# Patient Record
Sex: FEMALE | Race: WHITE | Employment: OTHER | URBAN - METROPOLITAN AREA
[De-identification: names, ages, dates, MRNs, and addresses within clinical notes are randomized per-mention and may not be internally consistent; named-entity substitution may affect disease eponyms.]

---

## 2023-09-07 PROBLEM — H40.1190 PRIMARY OPEN ANGLE GLAUCOMA: Status: ACTIVE | Noted: 2023-09-07

## 2023-09-07 PROBLEM — R73.09 BLOOD GLUCOSE ABNORMAL: Status: ACTIVE | Noted: 2023-09-07

## 2023-09-07 PROBLEM — H25.13 AGE-RELATED NUCLEAR CATARACT, BILATERAL: Status: ACTIVE | Noted: 2023-09-07

## 2023-09-07 PROBLEM — Z91.89 AT RISK FOR BLEEDING: Status: ACTIVE | Noted: 2023-09-07

## 2023-09-07 PROBLEM — E11.9 CONTROLLED DIABETES MELLITUS TYPE II WITHOUT COMPLICATION (MULTI): Status: ACTIVE | Noted: 2023-09-07

## 2023-09-07 PROBLEM — E11.319 DIABETIC RETINOPATHY (MULTI): Status: ACTIVE | Noted: 2023-09-07

## 2023-09-07 PROBLEM — I25.10 ATHEROSCLEROTIC HEART DISEASE OF NATIVE CORONARY ARTERY WITHOUT ANGINA PECTORIS: Status: ACTIVE | Noted: 2023-09-07

## 2023-09-07 PROBLEM — E11.65 HYPERGLYCEMIA DUE TO TYPE 2 DIABETES MELLITUS (MULTI): Status: ACTIVE | Noted: 2023-09-07

## 2023-09-07 PROBLEM — H43.813 POSTERIOR VITREOUS DETACHMENT OF BOTH EYES: Status: ACTIVE | Noted: 2023-09-07

## 2023-09-07 PROBLEM — E66.01 MORBID OBESITY (MULTI): Status: ACTIVE | Noted: 2023-09-07

## 2023-09-07 PROBLEM — D64.9 ANEMIA: Status: ACTIVE | Noted: 2023-09-07

## 2023-09-07 PROBLEM — E27.8 ADRENAL MASS (MULTI): Status: ACTIVE | Noted: 2023-09-07

## 2023-09-07 PROBLEM — I50.32 CHRONIC HEART FAILURE WITH PRESERVED EJECTION FRACTION (HFPEF) (MULTI): Status: ACTIVE | Noted: 2023-09-07

## 2023-09-07 PROBLEM — I10 ESSENTIAL HYPERTENSION: Status: ACTIVE | Noted: 2023-09-07

## 2023-09-07 PROBLEM — M81.0 AGE RELATED OSTEOPOROSIS: Status: ACTIVE | Noted: 2023-09-07

## 2023-09-07 PROBLEM — E11.3219: Status: ACTIVE | Noted: 2023-09-07

## 2023-09-07 PROBLEM — R60.9 EDEMA: Status: ACTIVE | Noted: 2023-09-07

## 2023-09-07 PROBLEM — N18.9 CHRONIC RENAL FAILURE SYNDROME: Status: ACTIVE | Noted: 2023-09-07

## 2023-09-07 PROBLEM — K21.9 GASTROESOPHAGEAL REFLUX DISEASE WITHOUT ESOPHAGITIS: Status: ACTIVE | Noted: 2023-09-07

## 2023-09-07 PROBLEM — I48.20 CHRONIC ATRIAL FIBRILLATION (MULTI): Status: ACTIVE | Noted: 2023-09-07

## 2023-09-07 PROBLEM — E78.2 MIXED HYPERLIPIDEMIA: Status: ACTIVE | Noted: 2023-09-07

## 2023-09-07 PROBLEM — H26.9 CATARACTS, BILATERAL: Status: ACTIVE | Noted: 2023-09-07

## 2023-09-07 PROBLEM — E55.9 VITAMIN D DEFICIENCY: Status: ACTIVE | Noted: 2023-09-07

## 2023-09-07 PROBLEM — I67.9 CEREBROVASCULAR DISEASE: Status: ACTIVE | Noted: 2023-09-07

## 2023-09-07 PROBLEM — E03.8 SUBCLINICAL HYPOTHYROIDISM: Status: ACTIVE | Noted: 2023-09-07

## 2023-09-07 PROBLEM — Z86.73 HISTORY OF ISCHEMIC STROKE: Status: ACTIVE | Noted: 2023-09-07

## 2023-09-07 PROBLEM — R55 SYNCOPE AND COLLAPSE: Status: ACTIVE | Noted: 2023-09-07

## 2023-09-07 PROBLEM — Z97.3 WEARS GLASSES: Status: ACTIVE | Noted: 2023-09-07

## 2023-09-07 PROBLEM — H40.9 GLAUCOMA: Status: ACTIVE | Noted: 2023-09-07

## 2023-09-07 RX ORDER — WARFARIN 6 MG/1
6 TABLET ORAL
COMMUNITY

## 2023-09-07 RX ORDER — ATORVASTATIN CALCIUM 20 MG/1
20 TABLET, FILM COATED ORAL DAILY
COMMUNITY
End: 2023-11-27

## 2023-09-07 RX ORDER — DULAGLUTIDE 1.5 MG/.5ML
INJECTION, SOLUTION SUBCUTANEOUS
COMMUNITY
End: 2023-11-21 | Stop reason: SDUPTHER

## 2023-09-07 RX ORDER — METFORMIN HYDROCHLORIDE 1000 MG/1
1000 TABLET ORAL
COMMUNITY
End: 2024-02-28 | Stop reason: WASHOUT

## 2023-09-07 RX ORDER — TORSEMIDE 100 MG/1
0.5 TABLET ORAL 2 TIMES DAILY
COMMUNITY
End: 2023-12-10 | Stop reason: ALTCHOICE

## 2023-09-07 RX ORDER — OMEPRAZOLE 20 MG/1
20 CAPSULE, DELAYED RELEASE ORAL
COMMUNITY

## 2023-09-07 RX ORDER — ACETAMINOPHEN 325 MG/1
325 TABLET ORAL
COMMUNITY

## 2023-09-07 RX ORDER — INSULIN LISPRO 100 [IU]/ML
INJECTION, SOLUTION INTRAVENOUS; SUBCUTANEOUS
COMMUNITY
Start: 2023-02-07 | End: 2024-01-04 | Stop reason: SDUPTHER

## 2023-09-07 RX ORDER — BLOOD SUGAR DIAGNOSTIC
STRIP MISCELLANEOUS 3 TIMES DAILY
COMMUNITY
End: 2023-10-11 | Stop reason: SDUPTHER

## 2023-09-07 RX ORDER — INSULIN GLARGINE 100 [IU]/ML
INJECTION, SOLUTION SUBCUTANEOUS
COMMUNITY
End: 2023-11-28 | Stop reason: SDUPTHER

## 2023-09-07 RX ORDER — POTASSIUM CHLORIDE 750 MG/1
10 CAPSULE, EXTENDED RELEASE ORAL
COMMUNITY
End: 2023-12-10 | Stop reason: ALTCHOICE

## 2023-09-07 RX ORDER — TORSEMIDE 100 MG/1
100 TABLET ORAL EVERY MORNING
COMMUNITY
End: 2023-12-26

## 2023-09-07 RX ORDER — GLIPIZIDE 5 MG/1
10 TABLET ORAL DAILY
COMMUNITY
End: 2024-02-28 | Stop reason: WASHOUT

## 2023-09-07 RX ORDER — CARVEDILOL 6.25 MG/1
6.25 TABLET ORAL
COMMUNITY
End: 2023-12-11 | Stop reason: SDUPTHER

## 2023-09-07 RX ORDER — CARVEDILOL 3.12 MG/1
3.12 TABLET ORAL 2 TIMES DAILY
COMMUNITY
End: 2024-02-19 | Stop reason: SDUPTHER

## 2023-09-07 RX ORDER — ISOSORBIDE MONONITRATE 60 MG/1
60 TABLET, EXTENDED RELEASE ORAL DAILY
COMMUNITY

## 2023-09-07 RX ORDER — LATANOPROST 50 UG/ML
1 SOLUTION/ DROPS OPHTHALMIC NIGHTLY
COMMUNITY
Start: 2013-07-31

## 2023-09-07 RX ORDER — HYDRALAZINE HYDROCHLORIDE 50 MG/1
50 TABLET, FILM COATED ORAL 2 TIMES DAILY
COMMUNITY
End: 2024-03-07 | Stop reason: SDUPTHER

## 2023-09-07 RX ORDER — TIMOLOL MALEATE 5 MG/ML
1 SOLUTION/ DROPS OPHTHALMIC EVERY MORNING
COMMUNITY
Start: 2016-11-04

## 2023-09-07 RX ORDER — FERROUS SULFATE, DRIED 160(50) MG
TABLET, EXTENDED RELEASE ORAL
COMMUNITY

## 2023-09-26 ENCOUNTER — HOSPITAL ENCOUNTER (OUTPATIENT)
Dept: DATA CONVERSION | Facility: HOSPITAL | Age: 88
Discharge: HOME | End: 2023-09-26
Payer: MEDICARE

## 2023-09-26 DIAGNOSIS — N18.4 CHRONIC KIDNEY DISEASE, STAGE 4 (SEVERE) (MULTI): ICD-10-CM

## 2023-09-26 DIAGNOSIS — N25.81 SECONDARY HYPERPARATHYROIDISM OF RENAL ORIGIN (MULTI): ICD-10-CM

## 2023-09-26 LAB
ALBUMIN SERPL-MCNC: 3.2 GM/DL (ref 3.5–5)
ANION GAP SERPL CALCULATED.3IONS-SCNC: 10 MMOL/L (ref 0–19)
BUN SERPL-MCNC: 24 MG/DL (ref 8–25)
BUN/CREAT SERPL: 14.1 RATIO (ref 8–21)
CALCIUM SERPL-MCNC: 9.4 MG/DL (ref 8.5–10.4)
CHLORIDE SERPL-SCNC: 104 MMOL/L (ref 97–107)
CO2 SERPL-SCNC: 29 MMOL/L (ref 24–31)
CREAT SERPL-MCNC: 1.7 MG/DL (ref 0.4–1.6)
DEPRECATED RDW RBC AUTO: 50.8 FL (ref 37–54)
ERYTHROCYTE [DISTWIDTH] IN BLOOD BY AUTOMATED COUNT: 14 % (ref 11.7–15)
GFR SERPL CREATININE-BSD FRML MDRD: 29 ML/MIN/1.73 M2
GLUCOSE SERPL-MCNC: 83 MG/DL (ref 65–99)
HCT VFR BLD AUTO: 37.3 % (ref 36–44)
HGB BLD-MCNC: 12 GM/DL (ref 12–15)
MCH RBC QN AUTO: 32.2 PG (ref 26–34)
MCHC RBC AUTO-ENTMCNC: 32.2 % (ref 31–37)
MCV RBC AUTO: 100 FL (ref 80–100)
NRBC BLD-RTO: 0 /100 WBC
PHOSPHATE SERPL-MCNC: 2.6 MG/DL (ref 2.5–4.5)
PLATELET # BLD AUTO: 196 K/UL (ref 150–450)
PMV BLD AUTO: 11.6 CU (ref 7–12.6)
POTASSIUM SERPL-SCNC: 3.5 MMOL/L (ref 3.4–5.1)
RBC # BLD AUTO: 3.73 M/UL (ref 4–4.9)
SODIUM SERPL-SCNC: 143 MMOL/L (ref 133–145)
WBC # BLD AUTO: 7 K/UL (ref 4.5–11)

## 2023-09-29 DIAGNOSIS — I48.91 ATRIAL FIBRILLATION, UNSPECIFIED TYPE (MULTI): Primary | ICD-10-CM

## 2023-09-29 LAB
INR IN PPP BY COAGULATION ASSAY EXTERNAL: 3.9
PROTHROMBIN TIME (PT) IN PPP BY COAGULATION ASSAY EXTERNAL: NORMAL SECONDS

## 2023-10-02 PROBLEM — I48.91 ATRIAL FIBRILLATION (MULTI): Status: ACTIVE | Noted: 2023-10-02

## 2023-10-02 LAB
CALCIUM SERPL-MCNC: 9.4 MG/DL (ref 8.5–10.4)
PHOSPHATE SERPL-MCNC: 2.6 MG/DL (ref 2.5–4.5)
PTH-INTACT SERPL-MCNC: NORMAL PG/ML (ref 15–65)

## 2023-10-06 ENCOUNTER — ANTICOAGULATION - WARFARIN VISIT (OUTPATIENT)
Dept: CARDIOLOGY | Facility: CLINIC | Age: 88
End: 2023-10-06
Payer: MEDICARE

## 2023-10-06 ENCOUNTER — APPOINTMENT (OUTPATIENT)
Dept: CARDIOLOGY | Facility: CLINIC | Age: 88
End: 2023-10-06
Payer: MEDICARE

## 2023-10-06 DIAGNOSIS — I48.91 ATRIAL FIBRILLATION, UNSPECIFIED TYPE (MULTI): ICD-10-CM

## 2023-10-06 LAB
POC INR: 2
POC PROTHROMBIN TIME: NORMAL

## 2023-10-06 PROCEDURE — 85610 PROTHROMBIN TIME: CPT

## 2023-10-06 PROCEDURE — 99211 OFF/OP EST MAY X REQ PHY/QHP: CPT | Mod: 27 | Performed by: INTERNAL MEDICINE

## 2023-10-06 NOTE — PROGRESS NOTES
Patient identification verified with 2 identifiers.    Location: [facility] ([address]) - [address] [address] [place] [address] [phone]     Referring Physician: Dr Potts  Enrollment/ Re-enrollment date: 24   INR Goal: 2.0-3.0  INR monitoring is per [facility] protocol.  Anticoagulation Medication: warfarin  Indication: atrial fibrillation    Subjective   Bleeding signs/symptoms: No    Bruising: No   Major bleeding event: No  Thrombosis signs/symptoms: No  Thromboembolic event: No  Missed doses: No  Extra doses: No  Medication changes: No  Dietary changes: No  Change in health: No  Change in activity: No  Alcohol: No  Other concerns: No    Upcoming Surgeries:  Does the Patient Have any upcoming surgeries that require interruption in anticoagulation therapy? no  Does the patient require bridging? no      Anticoagulation Summary  As of 10/6/2023      INR goal:  2.0-3.0   TTR:  --   INR used for dosin.00 (10/6/2023)   Weekly warfarin total:  45 mg               Assessment/Plan   Therapeutic     1. New dose: no change    2. Next INR: 1 week      Education provided to patient during the visit:  Patient instructed to call in interim with questions, concerns and changes.

## 2023-10-11 DIAGNOSIS — Z79.4 TYPE 2 DIABETES MELLITUS WITH HYPERGLYCEMIA, WITH LONG-TERM CURRENT USE OF INSULIN (MULTI): Primary | ICD-10-CM

## 2023-10-11 DIAGNOSIS — E11.65 TYPE 2 DIABETES MELLITUS WITH HYPERGLYCEMIA, WITH LONG-TERM CURRENT USE OF INSULIN (MULTI): Primary | ICD-10-CM

## 2023-10-11 RX ORDER — BLOOD SUGAR DIAGNOSTIC
1 STRIP MISCELLANEOUS 3 TIMES DAILY
Qty: 300 STRIP | Refills: 1 | Status: SHIPPED | OUTPATIENT
Start: 2023-10-11 | End: 2023-11-28

## 2023-10-20 ENCOUNTER — ANTICOAGULATION - WARFARIN VISIT (OUTPATIENT)
Dept: CARDIOLOGY | Facility: CLINIC | Age: 88
End: 2023-10-20
Payer: MEDICARE

## 2023-10-20 DIAGNOSIS — I48.91 ATRIAL FIBRILLATION, UNSPECIFIED TYPE (MULTI): ICD-10-CM

## 2023-10-20 LAB
POC INR: 2.7
POC PROTHROMBIN TIME: NORMAL

## 2023-10-20 PROCEDURE — 85610 PROTHROMBIN TIME: CPT | Mod: QW

## 2023-10-20 PROCEDURE — 99211 OFF/OP EST MAY X REQ PHY/QHP: CPT | Mod: PO | Performed by: INTERNAL MEDICINE

## 2023-10-20 NOTE — PROGRESS NOTES
Patient identification verified with 2 identifiers.    Location: [facility] - [address] [place] [address] [phone]     Referring Physician:  DR TAPIA  Enrollment/ Re-enrollment date: 24   INR Goal: 2.0-3.0  [facility].  Anticoagulation Medication: warfarin  Indication: atrial fibrillation    Subjective   Bleeding signs/symptoms: No    Bruising: No   Major bleeding event: No  Thrombosis signs/symptoms: No  Thromboembolic event: No  Missed doses: No  Extra doses: No  Medication changes: No  Dietary changes: No  Change in health: No  Change in activity: No  Alcohol: No  Other concerns: No    Upcoming Surgeries:  Does the Patient Have any upcoming surgeries that require interruption in anticoagulation therapy? no  Does the patient require bridging? no      Anticoagulation Summary  As of 10/20/2023      INR goal:  2.0-3.0   TTR:  100.0 % (1.6 wk)   INR used for dosin.70 (10/20/2023)   Weekly warfarin total:  45 mg               Assessment/Plan   Therapeutic     1. New dose: no change    2. Next INR: 1 month      Education provided to patient during the visit:  Patient instructed to call in interim with questions, concerns and changes.

## 2023-11-03 NOTE — PROGRESS NOTES
Patient ID: Shoshana Thomas is a 88 y.o. female.    Subjective    HPI  REASON FOR VISIT: Macrocytic anemia with B12 deficiency.  HISTORY OF PRESENT ILLNESS: This is an 88-year-old woman with a history of stage 4 chronic kidneydisease, diabetes, hypothyroidism, atrial fibrillation, hypertension and progressive macrocytic anemia in 2018diagnosed with B12 deficiency. Our evaluation  revealed macrocytic anemia, but white blood cell, subsets andplatelets were within normal limits. Initial evaluation noted modest iron deficiency as well as a B12 deficiency inlight of an elevated methylmalonic acid. She has been on B12 since February 2019, with monthly intramuscular  injections and has not had a trial of oral B12. She was also noted to have an EPO level between 40-50. She receivedIV Feraheme in September 2018. She has never required Procrit as her hemoglobin has remained over 10 and in fact,  more recently normal. Review of her ferritin values reveal a ferritin of above 130 on the September 30, 2019 with agradual decline to less than 80 in March 2021. She denies diarrhea or constipation. History of colonoscopy remotelyin the past. She does  admit to pagophagia and sometimes has chills, but denies coilonychia or tongue soreness. Shehas reflux symptoms, but no heartburn while taking omeprazole. She denies abdominal pain. There is no familyhistory of GI malignancy. There is a family history  of breast cancer, however, she denies breast lumps.     She has been taking oral B12 but not sublingually.  She had a follow-up with her nephrologist who estimates that in 2 years time she will be needing dialysis.  She has a chief complaint of arthritis particularly in her shoulders.  She has limited range  of motion.    No fevers, no night sweats.  No falls.  She occasionally has posterior nocturnal dyspnea.    Objective    BSA: There is no height or weight on file to calculate BSA.  There were no vitals taken for this visit.    Review of Systems   Constitutional:  Negative for fatigue.   HENT:  Negative for trouble swallowing.    Eyes:  Negative for visual disturbance.   Respiratory:  Negative for shortness of breath (postural nocturnal dyspnea).    Cardiovascular:  Negative for leg swelling.   Gastrointestinal:  Negative for blood in stool and constipation.   Endocrine: Negative for cold intolerance.   Genitourinary:  Negative for difficulty urinating.   Musculoskeletal:  Positive for back pain (lower back).   Skin:  Negative for rash and wound.   Allergic/Immunologic: Negative for environmental allergies.   Neurological:  Positive for light-headedness.   Hematological:  Does not bruise/bleed easily.   Psychiatric/Behavioral:  Negative for sleep disturbance.        Physical Exam  Vitals reviewed: in wheelchair.   Constitutional:       Appearance: Normal appearance.   HENT:      Head: Normocephalic and atraumatic.      Right Ear: External ear normal.      Left Ear: External ear normal.      Nose: Nose normal.      Mouth/Throat:      Mouth: Mucous membranes are moist.      Pharynx: Oropharynx is clear.   Eyes:      Extraocular Movements: Extraocular movements intact.      Pupils: Pupils are equal, round, and reactive to light.   Cardiovascular:      Rate and Rhythm: Normal rate and regular rhythm.   Pulmonary:      Effort: Pulmonary effort is normal. No respiratory distress.   Abdominal:      General: Abdomen is flat. Bowel sounds are normal.      Palpations: Abdomen is soft.   Musculoskeletal:         General: No swelling. Normal range of motion.      Cervical back: Normal range of motion and neck supple.   Skin:     General: Skin is warm.   Neurological:      General: No focal deficit present.      Mental Status: She is alert and oriented to person, place, and time.   Psychiatric:         Mood and Affect: Mood normal.         Behavior: Behavior normal.       Performance Status:        Assessment/Plan   Data: 3/15/2021, kappa 125, lambda  82, ratio normal   Ferritin 77  , 28% iron saturation  B12 normal   RBC 3.86, hemoglobin 12.3, white blood count 7.7, platelet count 239     SUMMARY: An 88-year-old woman with a history of stage 4 chronic kidney disease, diabetes, atrial fibrillation,  macrocytic anemia, diagnosed with B12 deficiency on the evidence of an elevated methylmalonic acid and managed with parenteral B12 therapy since February 2019, then transitioned to oral B12 in 2021. She has had IV Feraheme in the past. Her labs have previously  featured a gradual decline in her ferritin.     ASSESSMENT & PLAN:  1. B12 deficiency on the basis of an elevated methylmalonic acid.     begun on parenteral B12 therapy in February 2019.     begun on oral B12 in 2021, which she has not been taking sublingually.     methylmalonic acid 0.35 (4-24-23).    We do not have the benefit of a recent EGD evaluation. She is not complaining of UGI symptoms.    PLAN:   B12 level and methylmalonic acid  continue oral B12.  follow B12 and methylmalonic acid  Return to clinic in 6 months     2. Chronic kidney disease, not on dialysis.   Creatinin 1.60, eGFR 31 (3-20-22)   Creatinine 1.69, egFR 29 (4-24-23)   PLAN: Follow up with Dr. Portillo.    She has not been a candidate for Procrit, because her hemoglobin has been in the normal range.      3. Iron deficiency.    Ferritin 107 (3-23-23), 21% saturation TIBC 271   Patient denies GI bleeding and denies diarrhea. She actually has constipation.    She has a history of a colonoscopy in the remote past. She has been inclined to defer referral to Gastroenterology for colonoscopy.   She has been on ferrous sulfate every other day, which she tolerates well.  She has mild iron deficiency symptoms of chills and ice craving.   PLAN:  Ferritin and iron panel  RTC 6 months, MD visit and repeat assessment ferritin, iron profile, methylmalonic acid and CBC     Otherwise, return to clinic in six months    Cancer Staging   No matching  staging information was found for the patient.    Oncology History    No history exists.                 Alber Clayton MD

## 2023-11-06 ENCOUNTER — OFFICE VISIT (OUTPATIENT)
Dept: HEMATOLOGY/ONCOLOGY | Facility: CLINIC | Age: 88
End: 2023-11-06
Payer: MEDICARE

## 2023-11-06 VITALS
SYSTOLIC BLOOD PRESSURE: 167 MMHG | OXYGEN SATURATION: 98 % | DIASTOLIC BLOOD PRESSURE: 85 MMHG | HEART RATE: 65 BPM | WEIGHT: 218.26 LBS | TEMPERATURE: 96.4 F | BODY MASS INDEX: 37.17 KG/M2

## 2023-11-06 DIAGNOSIS — D53.9 MACROCYTIC ANEMIA: Primary | ICD-10-CM

## 2023-11-06 LAB
ALBUMIN SERPL BCP-MCNC: 3.5 G/DL (ref 3.4–5)
ALP SERPL-CCNC: 61 U/L (ref 33–136)
ALT SERPL W P-5'-P-CCNC: 17 U/L (ref 7–45)
ANION GAP SERPL CALC-SCNC: 13 MMOL/L (ref 10–20)
AST SERPL W P-5'-P-CCNC: 15 U/L (ref 9–39)
BASOPHILS # BLD AUTO: 0.03 X10*3/UL (ref 0–0.1)
BASOPHILS NFR BLD AUTO: 0.4 %
BILIRUB SERPL-MCNC: 0.5 MG/DL (ref 0–1.2)
BUN SERPL-MCNC: 28 MG/DL (ref 6–23)
CALCIUM SERPL-MCNC: 9.6 MG/DL (ref 8.6–10.3)
CHLORIDE SERPL-SCNC: 101 MMOL/L (ref 98–107)
CO2 SERPL-SCNC: 34 MMOL/L (ref 21–32)
CREAT SERPL-MCNC: 1.72 MG/DL (ref 0.5–1.05)
EOSINOPHIL # BLD AUTO: 0.18 X10*3/UL (ref 0–0.4)
EOSINOPHIL NFR BLD AUTO: 2.6 %
ERYTHROCYTE [DISTWIDTH] IN BLOOD BY AUTOMATED COUNT: 13.3 % (ref 11.5–14.5)
FERRITIN SERPL-MCNC: 142 NG/ML (ref 8–150)
GFR SERPL CREATININE-BSD FRML MDRD: 28 ML/MIN/1.73M*2
GLUCOSE SERPL-MCNC: 171 MG/DL (ref 74–99)
HCT VFR BLD AUTO: 36.8 % (ref 36–46)
HGB BLD-MCNC: 12.1 G/DL (ref 12–16)
IMM GRANULOCYTES # BLD AUTO: 0.02 X10*3/UL (ref 0–0.5)
IMM GRANULOCYTES NFR BLD AUTO: 0.3 % (ref 0–0.9)
IRON SATN MFR SERPL: 24 % (ref 25–45)
IRON SERPL-MCNC: 66 UG/DL (ref 35–150)
LYMPHOCYTES # BLD AUTO: 2.48 X10*3/UL (ref 0.8–3)
LYMPHOCYTES NFR BLD AUTO: 36.2 %
MCH RBC QN AUTO: 32.7 PG (ref 26–34)
MCHC RBC AUTO-ENTMCNC: 32.9 G/DL (ref 32–36)
MCV RBC AUTO: 100 FL (ref 80–100)
MONOCYTES # BLD AUTO: 0.52 X10*3/UL (ref 0.05–0.8)
MONOCYTES NFR BLD AUTO: 7.6 %
NEUTROPHILS # BLD AUTO: 3.63 X10*3/UL (ref 1.6–5.5)
NEUTROPHILS NFR BLD AUTO: 52.9 %
NRBC BLD-RTO: 0 /100 WBCS (ref 0–0)
PLATELET # BLD AUTO: 197 X10*3/UL (ref 150–450)
POTASSIUM SERPL-SCNC: 3.2 MMOL/L (ref 3.5–5.3)
PROT SERPL-MCNC: 7.5 G/DL (ref 6.4–8.2)
RBC # BLD AUTO: 3.7 X10*6/UL (ref 4–5.2)
SODIUM SERPL-SCNC: 145 MMOL/L (ref 136–145)
TIBC SERPL-MCNC: 274 UG/DL (ref 240–445)
UIBC SERPL-MCNC: 208 UG/DL (ref 110–370)
VIT B12 SERPL-MCNC: 575 PG/ML (ref 211–911)
WBC # BLD AUTO: 6.9 X10*3/UL (ref 4.4–11.3)

## 2023-11-06 PROCEDURE — 99214 OFFICE O/P EST MOD 30 MIN: CPT | Performed by: INTERNAL MEDICINE

## 2023-11-06 PROCEDURE — 3079F DIAST BP 80-89 MM HG: CPT | Performed by: INTERNAL MEDICINE

## 2023-11-06 PROCEDURE — 36415 COLL VENOUS BLD VENIPUNCTURE: CPT | Performed by: INTERNAL MEDICINE

## 2023-11-06 PROCEDURE — 1036F TOBACCO NON-USER: CPT | Performed by: INTERNAL MEDICINE

## 2023-11-06 PROCEDURE — 3077F SYST BP >= 140 MM HG: CPT | Performed by: INTERNAL MEDICINE

## 2023-11-06 PROCEDURE — 82607 VITAMIN B-12: CPT | Performed by: INTERNAL MEDICINE

## 2023-11-06 PROCEDURE — 83550 IRON BINDING TEST: CPT | Performed by: INTERNAL MEDICINE

## 2023-11-06 PROCEDURE — 82728 ASSAY OF FERRITIN: CPT | Performed by: INTERNAL MEDICINE

## 2023-11-06 PROCEDURE — 1125F AMNT PAIN NOTED PAIN PRSNT: CPT | Performed by: INTERNAL MEDICINE

## 2023-11-06 PROCEDURE — 1159F MED LIST DOCD IN RCRD: CPT | Performed by: INTERNAL MEDICINE

## 2023-11-06 ASSESSMENT — ENCOUNTER SYMPTOMS
OCCASIONAL FEELINGS OF UNSTEADINESS: 1
LIGHT-HEADEDNESS: 1
WOUND: 0
LOSS OF SENSATION IN FEET: 1
BLOOD IN STOOL: 0
CONSTIPATION: 0
SHORTNESS OF BREATH: 0
TROUBLE SWALLOWING: 0
FATIGUE: 0
DEPRESSION: 0
DIFFICULTY URINATING: 0
SLEEP DISTURBANCE: 0
BACK PAIN: 1
BRUISES/BLEEDS EASILY: 0

## 2023-11-06 ASSESSMENT — COLUMBIA-SUICIDE SEVERITY RATING SCALE - C-SSRS
6. HAVE YOU EVER DONE ANYTHING, STARTED TO DO ANYTHING, OR PREPARED TO DO ANYTHING TO END YOUR LIFE?: NO
2. HAVE YOU ACTUALLY HAD ANY THOUGHTS OF KILLING YOURSELF?: NO
1. IN THE PAST MONTH, HAVE YOU WISHED YOU WERE DEAD OR WISHED YOU COULD GO TO SLEEP AND NOT WAKE UP?: NO

## 2023-11-06 ASSESSMENT — PATIENT HEALTH QUESTIONNAIRE - PHQ9
SUM OF ALL RESPONSES TO PHQ9 QUESTIONS 1 AND 2: 0
1. LITTLE INTEREST OR PLEASURE IN DOING THINGS: NOT AT ALL
2. FEELING DOWN, DEPRESSED OR HOPELESS: NOT AT ALL

## 2023-11-06 ASSESSMENT — PAIN SCALES - GENERAL: PAINLEVEL: 9

## 2023-11-06 NOTE — PROGRESS NOTES
Patient here for follow up with Dr. Clayton.     Medications and allergies reviewed.     Labs to be drawn today.     Patient to follow up in 6 months.

## 2023-11-09 LAB — METHYLMALONATE SERPL-SCNC: 0.35 UMOL/L (ref 0–0.4)

## 2023-11-15 DIAGNOSIS — E10.319: Primary | ICD-10-CM

## 2023-11-15 RX ORDER — PEN NEEDLE, DIABETIC 30 GX3/16"
400 NEEDLE, DISPOSABLE MISCELLANEOUS 4 TIMES DAILY
COMMUNITY
End: 2023-11-15 | Stop reason: SDUPTHER

## 2023-11-15 RX ORDER — PEN NEEDLE, DIABETIC 30 GX3/16"
1 NEEDLE, DISPOSABLE MISCELLANEOUS 4 TIMES DAILY
Qty: 400 EACH | Refills: 1 | Status: SHIPPED | OUTPATIENT
Start: 2023-11-15

## 2023-11-17 ENCOUNTER — ANTICOAGULATION - WARFARIN VISIT (OUTPATIENT)
Dept: CARDIOLOGY | Facility: CLINIC | Age: 88
End: 2023-11-17
Payer: MEDICARE

## 2023-11-17 DIAGNOSIS — I48.91 ATRIAL FIBRILLATION, UNSPECIFIED TYPE (MULTI): ICD-10-CM

## 2023-11-17 LAB
POC INR: 2.5
POC PROTHROMBIN TIME: NORMAL

## 2023-11-17 PROCEDURE — 99211 OFF/OP EST MAY X REQ PHY/QHP: CPT | Performed by: INTERNAL MEDICINE

## 2023-11-17 PROCEDURE — 85610 PROTHROMBIN TIME: CPT | Mod: QW

## 2023-11-17 NOTE — PROGRESS NOTES
Patient identification verified with 2 identifiers.    Location: [facility] - [address] [place] [address] [phone]     Referring Physician:  DR TAPIA  Enrollment/ Re-enrollment date: 24   INR Goal: 2.0-3.0  INR monitoring is per [facility] protocol.  Anticoagulation Medication: warfarin  Indication: atrial fibrillation    Subjective   Bleeding signs/symptoms: No    Bruising: No   Major bleeding event: No  Thrombosis signs/symptoms: No  Thromboembolic event: No  Missed doses: No  Extra doses: No  Medication changes: No  Dietary changes: No  Change in health: No  Change in activity: No  Alcohol: No  Other concerns: No    Upcoming Surgeries:  Does the Patient Have any upcoming surgeries that require interruption in anticoagulation therapy? no  Does the patient require bridging? no      Anticoagulation Summary  As of 2023      INR goal:  2.0-3.0   TTR:  100.0 % (1.3 mo)   INR used for dosin.50 (2023)   Weekly warfarin total:  45 mg               Assessment/Plan   Therapeutic     1. New dose: no change    2. Next INR: 1 month      Education provided to patient during the visit:  Patient instructed to call in interim with questions, concerns and changes.

## 2023-11-21 DIAGNOSIS — E11.65 TYPE 2 DIABETES MELLITUS WITH HYPERGLYCEMIA, WITH LONG-TERM CURRENT USE OF INSULIN (MULTI): Primary | ICD-10-CM

## 2023-11-21 DIAGNOSIS — Z79.4 TYPE 2 DIABETES MELLITUS WITH HYPERGLYCEMIA, WITH LONG-TERM CURRENT USE OF INSULIN (MULTI): Primary | ICD-10-CM

## 2023-11-21 RX ORDER — DULAGLUTIDE 1.5 MG/.5ML
1.5 INJECTION, SOLUTION SUBCUTANEOUS
Qty: 2 ML | Refills: 0 | Status: SHIPPED | OUTPATIENT
Start: 2023-11-21 | End: 2023-11-28 | Stop reason: SDUPTHER

## 2023-11-24 DIAGNOSIS — E78.2 MIXED HYPERLIPIDEMIA: ICD-10-CM

## 2023-11-27 ENCOUNTER — HOSPITAL ENCOUNTER (EMERGENCY)
Facility: HOSPITAL | Age: 88
Discharge: HOME | End: 2023-11-27
Attending: STUDENT IN AN ORGANIZED HEALTH CARE EDUCATION/TRAINING PROGRAM
Payer: MEDICARE

## 2023-11-27 ENCOUNTER — TELEPHONE (OUTPATIENT)
Dept: HEMATOLOGY/ONCOLOGY | Facility: CLINIC | Age: 88
End: 2023-11-27
Payer: MEDICARE

## 2023-11-27 VITALS
SYSTOLIC BLOOD PRESSURE: 125 MMHG | OXYGEN SATURATION: 98 % | BODY MASS INDEX: 36.7 KG/M2 | HEART RATE: 60 BPM | DIASTOLIC BLOOD PRESSURE: 69 MMHG | TEMPERATURE: 98.1 F | WEIGHT: 215 LBS | HEIGHT: 64 IN | RESPIRATION RATE: 18 BRPM

## 2023-11-27 DIAGNOSIS — T38.3X1A INSULIN OVERDOSE, ACCIDENTAL OR UNINTENTIONAL, INITIAL ENCOUNTER: Primary | ICD-10-CM

## 2023-11-27 DIAGNOSIS — D50.9 IRON DEFICIENCY ANEMIA, UNSPECIFIED IRON DEFICIENCY ANEMIA TYPE: Primary | ICD-10-CM

## 2023-11-27 LAB
ALBUMIN SERPL-MCNC: 3.7 G/DL (ref 3.5–5)
ALP BLD-CCNC: 89 U/L (ref 35–125)
ALT SERPL-CCNC: 19 U/L (ref 5–40)
ANION GAP SERPL CALC-SCNC: 11 MMOL/L
AST SERPL-CCNC: 23 U/L (ref 5–40)
BASOPHILS # BLD AUTO: 0.04 X10*3/UL (ref 0–0.1)
BASOPHILS NFR BLD AUTO: 0.5 %
BILIRUB SERPL-MCNC: 0.3 MG/DL (ref 0.1–1.2)
BUN SERPL-MCNC: 30 MG/DL (ref 8–25)
CALCIUM SERPL-MCNC: 9.9 MG/DL (ref 8.5–10.4)
CHLORIDE SERPL-SCNC: 103 MMOL/L (ref 97–107)
CO2 SERPL-SCNC: 30 MMOL/L (ref 24–31)
CREAT SERPL-MCNC: 1.7 MG/DL (ref 0.4–1.6)
EOSINOPHIL # BLD AUTO: 0.13 X10*3/UL (ref 0–0.4)
EOSINOPHIL NFR BLD AUTO: 1.8 %
ERYTHROCYTE [DISTWIDTH] IN BLOOD BY AUTOMATED COUNT: 13.2 % (ref 11.5–14.5)
GFR SERPL CREATININE-BSD FRML MDRD: 29 ML/MIN/1.73M*2
GLUCOSE BLD MANUAL STRIP-MCNC: 105 MG/DL (ref 74–99)
GLUCOSE BLD MANUAL STRIP-MCNC: 142 MG/DL (ref 74–99)
GLUCOSE SERPL-MCNC: 147 MG/DL (ref 65–99)
HCT VFR BLD AUTO: 39.7 % (ref 36–46)
HGB BLD-MCNC: 13 G/DL (ref 12–16)
IMM GRANULOCYTES # BLD AUTO: 0.02 X10*3/UL (ref 0–0.5)
IMM GRANULOCYTES NFR BLD AUTO: 0.3 % (ref 0–0.9)
LYMPHOCYTES # BLD AUTO: 2.21 X10*3/UL (ref 0.8–3)
LYMPHOCYTES NFR BLD AUTO: 29.9 %
MCH RBC QN AUTO: 32.5 PG (ref 26–34)
MCHC RBC AUTO-ENTMCNC: 32.7 G/DL (ref 32–36)
MCV RBC AUTO: 99 FL (ref 80–100)
MONOCYTES # BLD AUTO: 0.59 X10*3/UL (ref 0.05–0.8)
MONOCYTES NFR BLD AUTO: 8 %
NEUTROPHILS # BLD AUTO: 4.39 X10*3/UL (ref 1.6–5.5)
NEUTROPHILS NFR BLD AUTO: 59.5 %
NRBC BLD-RTO: 0 /100 WBCS (ref 0–0)
PLATELET # BLD AUTO: 216 X10*3/UL (ref 150–450)
POTASSIUM SERPL-SCNC: 3.7 MMOL/L (ref 3.4–5.1)
PROT SERPL-MCNC: 7.8 G/DL (ref 5.9–7.9)
RBC # BLD AUTO: 4 X10*6/UL (ref 4–5.2)
SODIUM SERPL-SCNC: 144 MMOL/L (ref 133–145)
WBC # BLD AUTO: 7.4 X10*3/UL (ref 4.4–11.3)

## 2023-11-27 PROCEDURE — 99283 EMERGENCY DEPT VISIT LOW MDM: CPT

## 2023-11-27 PROCEDURE — 84132 ASSAY OF SERUM POTASSIUM: CPT | Performed by: STUDENT IN AN ORGANIZED HEALTH CARE EDUCATION/TRAINING PROGRAM

## 2023-11-27 PROCEDURE — 36415 COLL VENOUS BLD VENIPUNCTURE: CPT | Performed by: STUDENT IN AN ORGANIZED HEALTH CARE EDUCATION/TRAINING PROGRAM

## 2023-11-27 PROCEDURE — 99284 EMERGENCY DEPT VISIT MOD MDM: CPT | Performed by: STUDENT IN AN ORGANIZED HEALTH CARE EDUCATION/TRAINING PROGRAM

## 2023-11-27 PROCEDURE — 82947 ASSAY GLUCOSE BLOOD QUANT: CPT

## 2023-11-27 PROCEDURE — 85025 COMPLETE CBC W/AUTO DIFF WBC: CPT | Performed by: STUDENT IN AN ORGANIZED HEALTH CARE EDUCATION/TRAINING PROGRAM

## 2023-11-27 RX ORDER — ATORVASTATIN CALCIUM 20 MG/1
20 TABLET, FILM COATED ORAL DAILY
Qty: 90 TABLET | Refills: 0 | Status: SHIPPED | OUTPATIENT
Start: 2023-11-27 | End: 2024-02-16

## 2023-11-27 RX ORDER — QUINIDINE GLUCONATE 324 MG
27 TABLET, EXTENDED RELEASE ORAL EVERY OTHER DAY
Qty: 45 TABLET | Refills: 1 | Status: SHIPPED | OUTPATIENT
Start: 2023-11-27 | End: 2023-12-04 | Stop reason: SDUPTHER

## 2023-11-27 ASSESSMENT — PAIN DESCRIPTION - PROGRESSION: CLINICAL_PROGRESSION: NOT CHANGED

## 2023-11-27 ASSESSMENT — PAIN - FUNCTIONAL ASSESSMENT: PAIN_FUNCTIONAL_ASSESSMENT: 0-10

## 2023-11-27 NOTE — DISCHARGE INSTRUCTIONS
Follow-up with your primary care physician as needed, return to the ED for any new or worsening symptoms including severe hypoglycemia that you are unable to manage at home, if you actually take strong medication again or for any new or concerning symptoms that you feel require emergent evaluation by physician.

## 2023-11-28 DIAGNOSIS — E11.65 TYPE 2 DIABETES MELLITUS WITH HYPERGLYCEMIA, WITH LONG-TERM CURRENT USE OF INSULIN (MULTI): Primary | ICD-10-CM

## 2023-11-28 DIAGNOSIS — Z79.4 TYPE 2 DIABETES MELLITUS WITH HYPERGLYCEMIA, WITH LONG-TERM CURRENT USE OF INSULIN (MULTI): ICD-10-CM

## 2023-11-28 DIAGNOSIS — Z79.4 TYPE 2 DIABETES MELLITUS WITH HYPERGLYCEMIA, WITH LONG-TERM CURRENT USE OF INSULIN (MULTI): Primary | ICD-10-CM

## 2023-11-28 DIAGNOSIS — E11.65 TYPE 2 DIABETES MELLITUS WITH HYPERGLYCEMIA, WITH LONG-TERM CURRENT USE OF INSULIN (MULTI): ICD-10-CM

## 2023-11-28 RX ORDER — BLOOD SUGAR DIAGNOSTIC
STRIP MISCELLANEOUS
Qty: 300 STRIP | Refills: 1 | Status: SHIPPED | OUTPATIENT
Start: 2023-11-28 | End: 2024-02-19 | Stop reason: SDUPTHER

## 2023-11-28 RX ORDER — INSULIN GLARGINE 100 [IU]/ML
30 INJECTION, SOLUTION SUBCUTANEOUS EVERY MORNING
Qty: 27 ML | Refills: 3 | Status: SHIPPED | OUTPATIENT
Start: 2023-11-28 | End: 2024-11-27

## 2023-11-28 RX ORDER — DULAGLUTIDE 1.5 MG/.5ML
1.5 INJECTION, SOLUTION SUBCUTANEOUS
Qty: 2 ML | Refills: 1 | Status: SHIPPED | OUTPATIENT
Start: 2023-11-28

## 2023-11-28 NOTE — ED PROVIDER NOTES
HPI   Chief Complaint   Patient presents with    Medication error     Pt thinks she took 20 units of her humalog instead of 20 units of her Basaglar.        This is an 88-year-old female with a past medical history of insulin-dependent diabetes presenting the ED for evaluation after possible unintentional overdose of her short acting insulin.  She states that she typically takes 20 units of Basaglar and uses a short acting insulin as needed with meals.  She did not eat until later in the day so she did not take her Basaglar early in the morning.  She states that she was trying to take her Basaglar but is concerned that she may have accidentally injected 20 units of her short acting insulin instead.  She denies any lightheadedness or dizziness, weakness, nausea or vomiting, abdominal pain.  She states that shortly after realizing her mistake she called EMS to be brought to the ED for monitoring.      History provided by:  Patient   used: No                        No data recorded                Patient History   Past Medical History:   Diagnosis Date    Personal history of other diseases of the nervous system and sense organs     History of cataract    Personal history of other diseases of the nervous system and sense organs     History of glaucoma     Past Surgical History:   Procedure Laterality Date    MR HEAD ANGIO WO IV CONTRAST  6/14/2016    MR HEAD ANGIO WO IV CONTRAST [facility]    OTHER SURGICAL HISTORY  01/22/2019    No history of surgery     Family History   Problem Relation Name Age of Onset    Other (VAD) Mother      Diabetes Father      Other (Sepsis) Father      Other (DJD) Sister      Diabetes Sister      Factor V Leiden deficiency Sister      Other (CKD) Brother      Heart disease Other      Hypertension Other      Cancer Other      Stroke Other       Social History     Tobacco Use    Smoking status: Never    Smokeless tobacco: Never   Vaping Use    Vaping Use: Never  used   Substance Use Topics    Alcohol use: Yes     Comment: very rarley    Drug use: Never       Physical Exam   ED Triage Vitals [11/27/23 1417]   Temp Heart Rate Resp BP   36.7 °C (98.1 °F) 61 16 132/63      SpO2 Temp Source Heart Rate Source Patient Position   99 % Oral Monitor Sitting      BP Location FiO2 (%)     Left arm --       Labs Reviewed   COMPREHENSIVE METABOLIC PANEL - Abnormal       Result Value    Glucose 147 (*)     Sodium 144      Potassium 3.7      Chloride 103      Bicarbonate 30      Urea Nitrogen 30 (*)     Creatinine 1.70 (*)     eGFR 29 (*)     Calcium 9.9      Albumin 3.7      Alkaline Phosphatase 89      Total Protein 7.8      AST 23      Bilirubin, Total 0.3      ALT 19      Anion Gap 11     POCT GLUCOSE - Abnormal    POCT Glucose 105 (*)    POCT GLUCOSE - Abnormal    POCT Glucose 142 (*)    CBC WITH AUTO DIFFERENTIAL    WBC 7.4      nRBC 0.0      RBC 4.00      Hemoglobin 13.0      Hematocrit 39.7      MCV 99      MCH 32.5      MCHC 32.7      RDW 13.2      Platelets 216      Neutrophils % 59.5      Immature Granulocytes %, Automated 0.3      Lymphocytes % 29.9      Monocytes % 8.0      Eosinophils % 1.8      Basophils % 0.5      Neutrophils Absolute 4.39      Immature Granulocytes Absolute, Automated 0.02      Lymphocytes Absolute 2.21      Monocytes Absolute 0.59      Eosinophils Absolute 0.13      Basophils Absolute 0.04     POCT GLUCOSE METER   POCT GLUCOSE METER   POCT GLUCOSE METER         Physical Exam  General: well developed, obese elderly female who is awake and alert, oriented x4, in no apparent distress  HENT: normocephalic, atraumatic.   CV: regular rate and rhythm, no murmur, no gallops, or rubs.  Resp: clear to ascultation bilaterally, no wheezes, rales, or rhonchi  GI: abdomen soft, nontender without rigidity or guarding, no peritoneal signs, abdomen is nondistended, no masses palpated  Psych: appropriate mood and affect, cooperative with exam  Skin: warm, dry, without  evidence of rash or abrasions    ED Course & MDM   Diagnoses as of 11/28/23 1558   Insulin overdose, accidental or unintentional, initial encounter       Medical Decision Making  PMH: Insulin-dependent diabetes  History obtained: directly from patient   Social factors affecting disposition: none    She is in no acute distress here.  She has no abdominal pain, nausea vomiting, lightheadedness or dizziness, shortness of breath, chest pain or other complaints.  Initial glucose is 147.  Creatinine is 1.7 which is at the patient's baseline.  She was observed in the emergency department, point-of-care glucose did fall to 105.  She was given some food and glucose went back up to 142.  Patient remained stable after observation for a few hours in the ED.  She did not have any significant hypoglycemic episodes while here.  She was discharged from the ED in stable condition.    Procedure  Procedures     Anton Rojas,   11/28/23 1602

## 2023-12-04 ENCOUNTER — TELEPHONE (OUTPATIENT)
Dept: HEMATOLOGY/ONCOLOGY | Facility: CLINIC | Age: 88
End: 2023-12-04
Payer: MEDICARE

## 2023-12-04 DIAGNOSIS — D50.9 IRON DEFICIENCY ANEMIA, UNSPECIFIED IRON DEFICIENCY ANEMIA TYPE: ICD-10-CM

## 2023-12-04 RX ORDER — QUINIDINE GLUCONATE 324 MG
27 TABLET, EXTENDED RELEASE ORAL EVERY OTHER DAY
Qty: 45 TABLET | Refills: 1 | Status: SHIPPED | OUTPATIENT
Start: 2023-12-04 | End: 2024-12-03

## 2023-12-10 RX ORDER — POTASSIUM CHLORIDE 750 MG/1
10 TABLET, EXTENDED RELEASE ORAL 2 TIMES DAILY
COMMUNITY
Start: 2023-11-24

## 2023-12-10 RX ORDER — CALCITRIOL 0.25 UG/1
0.25 CAPSULE ORAL DAILY
COMMUNITY
Start: 2023-11-17

## 2023-12-10 RX ORDER — FERROUS SULFATE 325(65) MG
325 TABLET ORAL DAILY
COMMUNITY
End: 2024-02-28 | Stop reason: WASHOUT

## 2023-12-11 ENCOUNTER — OFFICE VISIT (OUTPATIENT)
Dept: CARDIOLOGY | Facility: CLINIC | Age: 88
End: 2023-12-11
Payer: MEDICARE

## 2023-12-11 VITALS
OXYGEN SATURATION: 98 % | BODY MASS INDEX: 37.05 KG/M2 | RESPIRATION RATE: 18 BRPM | WEIGHT: 217 LBS | HEART RATE: 56 BPM | SYSTOLIC BLOOD PRESSURE: 130 MMHG | DIASTOLIC BLOOD PRESSURE: 70 MMHG | HEIGHT: 64 IN | TEMPERATURE: 98.9 F

## 2023-12-11 DIAGNOSIS — I25.10 ATHEROSCLEROSIS OF NATIVE CORONARY ARTERY OF NATIVE HEART WITHOUT ANGINA PECTORIS: ICD-10-CM

## 2023-12-11 DIAGNOSIS — I50.30 DIASTOLIC CONGESTIVE HEART FAILURE, UNSPECIFIED HF CHRONICITY (MULTI): Primary | ICD-10-CM

## 2023-12-11 DIAGNOSIS — E78.2 MIXED HYPERLIPIDEMIA: ICD-10-CM

## 2023-12-11 DIAGNOSIS — I10 ESSENTIAL HYPERTENSION: ICD-10-CM

## 2023-12-11 DIAGNOSIS — I48.0 PAROXYSMAL ATRIAL FIBRILLATION (MULTI): ICD-10-CM

## 2023-12-11 DIAGNOSIS — I48.20 CHRONIC ATRIAL FIBRILLATION (MULTI): ICD-10-CM

## 2023-12-11 PROCEDURE — 3078F DIAST BP <80 MM HG: CPT | Performed by: INTERNAL MEDICINE

## 2023-12-11 PROCEDURE — 1036F TOBACCO NON-USER: CPT | Performed by: INTERNAL MEDICINE

## 2023-12-11 PROCEDURE — 1159F MED LIST DOCD IN RCRD: CPT | Performed by: INTERNAL MEDICINE

## 2023-12-11 PROCEDURE — 1126F AMNT PAIN NOTED NONE PRSNT: CPT | Performed by: INTERNAL MEDICINE

## 2023-12-11 PROCEDURE — 3075F SYST BP GE 130 - 139MM HG: CPT | Performed by: INTERNAL MEDICINE

## 2023-12-11 PROCEDURE — 99214 OFFICE O/P EST MOD 30 MIN: CPT | Performed by: INTERNAL MEDICINE

## 2023-12-11 ASSESSMENT — ENCOUNTER SYMPTOMS
LOSS OF SENSATION IN FEET: 1
DEPRESSION: 0
OCCASIONAL FEELINGS OF UNSTEADINESS: 1

## 2023-12-11 ASSESSMENT — PAIN SCALES - GENERAL: PAINLEVEL: 0-NO PAIN

## 2023-12-11 NOTE — PROGRESS NOTES
History of present illness:  88 year old female patient here today following up on hx of HFpEF and AFIB status post LINQ placement in 08/2015, patient also has hx of CKD Stage III. 2D. Nuclear stress test in 08/2017 showed mild to moderate anterior wall ischemia.  Echo in 2021 showed mild aortic regurgitation and mitral regurgitation with normal ejection fraction.  Patient returns to my office for follow-up.  Overall has been stable denies having any chest pain or shortness of breath.     Past Medical History:   Diagnosis Date    At risk for bleeding 09/07/2023    Atherosclerotic heart disease of native coronary artery without angina pectoris 09/07/2023    Chronic atrial fibrillation (CMS/HCC) 09/07/2023    Chronic heart failure with preserved ejection fraction (HFpEF) (CMS/[facility]) 09/07/2023    Controlled diabetes mellitus type II without complication (CMS/[facility]) 09/07/2023    Edema 09/07/2023    Essential hypertension 09/07/2023    History of ischemic stroke 09/07/2023    Mixed hyperlipidemia 09/07/2023    Personal history of other diseases of the nervous system and sense organs     History of cataract    Personal history of other diseases of the nervous system and sense organs     History of glaucoma    Syncope and collapse 09/07/2023       Past Surgical History:   Procedure Laterality Date    MR HEAD ANGIO WO IV CONTRAST  6/14/2016    MR HEAD ANGIO WO IV CONTRAST [facility]    OTHER SURGICAL HISTORY  01/22/2019    No history of surgery       No Known Allergies     reports that she has never smoked. She has never been exposed to tobacco smoke. She has never used smokeless tobacco. She reports current alcohol use. She reports that she does not use drugs.    Family History   Problem Relation Name Age of Onset    Other (VAD) Mother      Diabetes Father      Other (Sepsis) Father      Other (DJD) Sister      Diabetes Sister      Factor V Leiden deficiency Sister      Other (CKD) Brother      Heart disease Other       Hypertension Other      Cancer Other      Stroke Other         Patient's Medications   New Prescriptions    No medications on file   Previous Medications    ACCU-CHEK SMARTVIEW TEST STRIP STRIP    USE 1 STRIP INTO VITRO, 3 TIMES A DAY-- E11.65    ACETAMINOPHEN (TYLENOL) 325 MG TABLET    Take 1 tablet (325 mg) by mouth.    ATORVASTATIN (LIPITOR) 20 MG TABLET    Take 1 tablet (20 mg) by mouth once daily.    CALCITRIOL (ROCALTROL) 0.25 MCG CAPSULE    Take 1 capsule (0.25 mcg) by mouth once daily.    CALCIUM CARBONATE-VITAMIN D3 500 MG-5 MCG (200 UNIT) TABLET    Os-Joselo 500 + D 500-200 MG-UNIT TABS   Refills: 0       Active    CARVEDILOL (COREG) 3.125 MG TABLET    Take 1 tablet (3.125 mg) by mouth 2 times a day.    DULAGLUTIDE (TRULICITY) 1.5 MG/0.5 ML PEN INJECTOR INJECTION    Inject 1.5 mg under the skin 1 (one) time per week. As directed    FERROUS GLUCONATE (FERATE) 240 (27 FE) MG TABLET    Take 1 tablet (27 mg) by mouth every other day.    FERROUS SULFATE, 325 MG FERROUS SULFATE, TABLET    Take 1 tablet by mouth once daily.    GLIPIZIDE (GLUCOTROL) 5 MG TABLET    Take 2 tablets (10 mg) by mouth once daily.    HYDRALAZINE (APRESOLINE) 50 MG TABLET    Take 1 tablet (50 mg) by mouth 2 times a day.    INSULIN GLARGINE (BASAGLAR KWIKPEN U-100 INSULIN) 100 UNIT/ML (3 ML) PEN    Inject 30 Units under the skin once daily in the morning. As directed    INSULIN LISPRO (HUMALOG KWIKPEN INSULIN) 100 UNIT/ML INJECTION    Inject under the skin. As directed    ISOSORBIDE MONONITRATE ER (IMDUR) 60 MG 24 HR TABLET    Take 1 tablet (60 mg) by mouth once daily.    LATANOPROST (XALATAN) 0.005 % OPHTHALMIC SOLUTION    Administer 1 drop into affected eye(s) once daily at bedtime.    LUBRICATING EYE DROPS (POLYVINYL ALCOHOL-POVIDON,PF,) OPHTHALMIC SOLUTION    Administer into affected eye(s).    METFORMIN (GLUCOPHAGE) 1,000 MG TABLET    Take 1 tablet (1,000 mg) by mouth 2 times a day with meals.    OMEPRAZOLE (PRILOSEC) 20 MG DR  "CAPSULE    Take 1 capsule (20 mg) by mouth once daily in the morning. Take before meals. 30 minutes before    PEN NEEDLE, DIABETIC 32 GAUGE X 5/32\" NEEDLE    1 Needle 4 times a day.    POTASSIUM CHLORIDE CR 10 MEQ ER TABLET    Take 1 tablet (10 mEq) by mouth 2 times a day.    TIMOLOL (TIMOPTIC) 0.5 % OPHTHALMIC SOLUTION    Administer 1 drop into both eyes once daily in the morning. *NEED APPT FOR FUTURE FILLS*    TORSEMIDE (DEMADEX) 100 MG TABLET    Take 1 tablet (100 mg) by mouth once daily in the morning. 0.5 tablet PM    VITAMIN B COMPLEX ORAL    Take by mouth. As directed    WARFARIN (COUMADIN) 6 MG TABLET    Take 1 tablet (6 mg) by mouth. 1 1/2 sun, tues, thurs; 6 mg rest of wk   Modified Medications    No medications on file   Discontinued Medications    CALCITRIOL ORAL    Calcitriol    CARVEDILOL (COREG) 6.25 MG TABLET    Take 1 tablet (6.25 mg) by mouth.    CHOLECALCIFEROL, VITAMIN D3, (VITAMIN D3 ORAL)    Take by mouth.    POTASSIUM CHLORIDE ER (MICRO-K) 10 MEQ ER CAPSULE    Take 1 capsule (10 mEq) by mouth 2 times a day with meals.    TORSEMIDE (DEMADEX) 100 MG TABLET    Take 0.5 tablets (50 mg) by mouth 2 times a day.       Objective   Physical Exam  General: Patient in no acute distress   HEENT: Atraumatic normocephalic.  Neck: Supple, jugular venous pressure within normal limit.  No bruits  Lungs: Clear to auscultation bilaterally  Cardiovascular: Regular rate and rhythm, normal heart sounds, no murmurs rubs or gallops  Abdomen: Soft nontender nondistended.  Normal bowel sounds.  Extremities: Warm to touch, no edema.    Lab Review   Admission on 11/27/2023, Discharged on 11/27/2023   Component Date Value    WBC 11/27/2023 7.4     nRBC 11/27/2023 0.0     RBC 11/27/2023 4.00     Hemoglobin 11/27/2023 13.0     Hematocrit 11/27/2023 39.7     MCV 11/27/2023 99     MCH 11/27/2023 32.5     MCHC 11/27/2023 32.7     RDW 11/27/2023 13.2     Platelets 11/27/2023 216     Neutrophils % 11/27/2023 59.5     Immature " Granulocytes %,* 11/27/2023 0.3     Lymphocytes % 11/27/2023 29.9     Monocytes % 11/27/2023 8.0     Eosinophils % 11/27/2023 1.8     Basophils % 11/27/2023 0.5     Neutrophils Absolute 11/27/2023 4.39     Immature Granulocytes Ab* 11/27/2023 0.02     Lymphocytes Absolute 11/27/2023 2.21     Monocytes Absolute 11/27/2023 0.59     Eosinophils Absolute 11/27/2023 0.13     Basophils Absolute 11/27/2023 0.04     Glucose 11/27/2023 147 (H)     Sodium 11/27/2023 144     Potassium 11/27/2023 3.7     Chloride 11/27/2023 103     Bicarbonate 11/27/2023 30     Urea Nitrogen 11/27/2023 30 (H)     Creatinine 11/27/2023 1.70 (H)     eGFR 11/27/2023 29 (L)     Calcium 11/27/2023 9.9     Albumin 11/27/2023 3.7     Alkaline Phosphatase 11/27/2023 89     Total Protein 11/27/2023 7.8     AST 11/27/2023 23     Bilirubin, Total 11/27/2023 0.3     ALT 11/27/2023 19     Anion Gap 11/27/2023 11     POCT Glucose 11/27/2023 105 (H)     POCT Glucose 11/27/2023 142 (H)    Anticoagulation - Warfarin Visit on 11/17/2023   Component Date Value    POC INR 11/17/2023 2.50    Office Visit on 11/06/2023   Component Date Value    Ferritin 11/06/2023 142     Iron 11/06/2023 66     UIBC 11/06/2023 208     TIBC 11/06/2023 274     % Saturation 11/06/2023 24 (L)     Glucose 11/06/2023 171 (H)     Sodium 11/06/2023 145     Potassium 11/06/2023 3.2 (L)     Chloride 11/06/2023 101     Bicarbonate 11/06/2023 34 (H)     Anion Gap 11/06/2023 13     Urea Nitrogen 11/06/2023 28 (H)     Creatinine 11/06/2023 1.72 (H)     eGFR 11/06/2023 28 (L)     Calcium 11/06/2023 9.6     Albumin 11/06/2023 3.5     Alkaline Phosphatase 11/06/2023 61     Total Protein 11/06/2023 7.5     AST 11/06/2023 15     Bilirubin, Total 11/06/2023 0.5     ALT 11/06/2023 17     WBC 11/06/2023 6.9     nRBC 11/06/2023 0.0     RBC 11/06/2023 3.70 (L)     Hemoglobin 11/06/2023 12.1     Hematocrit 11/06/2023 36.8     MCV 11/06/2023 100     MCH 11/06/2023 32.7     MCHC 11/06/2023 32.9     RDW  11/06/2023 13.3     Platelets 11/06/2023 197     Neutrophils % 11/06/2023 52.9     Immature Granulocytes %,* 11/06/2023 0.3     Lymphocytes % 11/06/2023 36.2     Monocytes % 11/06/2023 7.6     Eosinophils % 11/06/2023 2.6     Basophils % 11/06/2023 0.4     Neutrophils Absolute 11/06/2023 3.63     Immature Granulocytes Ab* 11/06/2023 0.02     Lymphocytes Absolute 11/06/2023 2.48     Monocytes Absolute 11/06/2023 0.52     Eosinophils Absolute 11/06/2023 0.18     Basophils Absolute 11/06/2023 0.03     Methylmalonic Acid, S 11/06/2023 0.35     Vitamin B12 11/06/2023 575    Anticoagulation - Warfarin Visit on 10/20/2023   Component Date Value    POC INR 10/20/2023 2.70         Assessment/Plan   Patient Active Problem List   Diagnosis    At risk for bleeding    Adrenal mass (CMS/HCC)    Age-related nuclear cataract, bilateral    Age related osteoporosis    Anemia    Atherosclerotic heart disease of native coronary artery without angina pectoris    Chronic atrial fibrillation (CMS/[facility])    Background diabetic macular edema with mild nonproliferative retinopathy associated with type 2 diabetes mellitus (CMS/[facility])    Blood glucose abnormal    Cataracts, bilateral    Cerebrovascular disease    Chronic heart failure with preserved ejection fraction (HFpEF) (CMS/[facility])    Chronic renal failure syndrome    Diabetic retinopathy (CMS/[facility])    Edema    Essential hypertension    Gastroesophageal reflux disease without esophagitis    History of ischemic stroke    Hyperglycemia due to type 2 diabetes mellitus (CMS/[facility])    Mixed hyperlipidemia    Morbid obesity (CMS/[facility])    Posterior vitreous detachment of both eyes    Primary open angle glaucoma    Glaucoma    Subclinical hypothyroidism    Syncope and collapse    Controlled diabetes mellitus type II without complication (CMS/[facility])    Vitamin D deficiency    Wears glasses    Atrial fibrillation (CMS/HCC)      88 year old female patient here today following up on hx of HFpEF and AFIB status  post LINQ placement in 08/2015, patient also has hx of CKD Stage III. 2D. Nuclear stress test in 08/2017 showed mild to moderate anterior wall ischemia.  Echo in 2021 showed mild aortic regurgitation and mitral regurgitation with normal ejection fraction.  Patient returns to my office for follow-up.  Overall has been stable denies having any chest pain or shortness of breath.  Using wheelchair most of the time.  Denies having dizziness lightheadedness palpitations.  Blood pressure and heart rate well-controlled.  Reviewed patient labs creatinine stable 1.7 still on high-dose torsemide.  Cholesterol under control.  At this point patient is doing good from my standpoint we will monitor.  Will follow-up in 6 months.      Ian Potts MD

## 2023-12-22 ENCOUNTER — ANTICOAGULATION - WARFARIN VISIT (OUTPATIENT)
Dept: CARDIOLOGY | Facility: CLINIC | Age: 88
End: 2023-12-22
Payer: MEDICARE

## 2023-12-22 DIAGNOSIS — I50.30 DIASTOLIC CONGESTIVE HEART FAILURE, UNSPECIFIED HF CHRONICITY (MULTI): Primary | ICD-10-CM

## 2023-12-22 DIAGNOSIS — I48.91 ATRIAL FIBRILLATION, UNSPECIFIED TYPE (MULTI): ICD-10-CM

## 2023-12-22 LAB
POC INR: 2.9
POC PROTHROMBIN TIME: NORMAL

## 2023-12-22 PROCEDURE — 85610 PROTHROMBIN TIME: CPT | Mod: QW

## 2023-12-22 PROCEDURE — 99211 OFF/OP EST MAY X REQ PHY/QHP: CPT | Performed by: INTERNAL MEDICINE

## 2023-12-22 NOTE — PROGRESS NOTES
Patient identification verified with 2 identifiers.    Location: [facility] - [address] [place] [address] [phone]     Referring Physician:  DR TAPIA  Enrollment/ Re-enrollment date: 24   INR Goal: 2.0-3.0  INR monitoring is per [facility] protocol.  Anticoagulation Medication: warfarin  Indication: atrial fibrillation    Subjective   Bleeding signs/symptoms: No    Bruising: No   Major bleeding event: No  Thrombosis signs/symptoms: No  Thromboembolic event: No  Missed doses: No  Extra doses: No  Medication changes: No  Dietary changes: No  Change in health: No  Change in activity: No  Alcohol: No  Other concerns: No    Upcoming Surgeries:  Does the Patient Have any upcoming surgeries that require interruption in anticoagulation therapy? no  Does the patient require bridging? no      Anticoagulation Summary  As of 2023      INR goal:  2.0-3.0   TTR:  100.0 % (2.5 mo)   INR used for dosin.90 (2023)   Weekly warfarin total:  45 mg               Assessment/Plan   Therapeutic     1. New dose: no change    2. Next INR: 1 month      Education provided to patient during the visit:  Patient instructed to call in interim with questions, concerns and changes.

## 2023-12-26 RX ORDER — TORSEMIDE 100 MG/1
TABLET ORAL
Qty: 90 TABLET | Refills: 1 | Status: SHIPPED | OUTPATIENT
Start: 2023-12-26 | End: 2024-05-02

## 2024-01-04 DIAGNOSIS — Z79.4 TYPE 2 DIABETES MELLITUS WITH HYPERGLYCEMIA, WITH LONG-TERM CURRENT USE OF INSULIN (MULTI): Primary | ICD-10-CM

## 2024-01-04 DIAGNOSIS — E11.65 TYPE 2 DIABETES MELLITUS WITH HYPERGLYCEMIA, WITH LONG-TERM CURRENT USE OF INSULIN (MULTI): Primary | ICD-10-CM

## 2024-01-04 RX ORDER — INSULIN LISPRO 100 [IU]/ML
INJECTION, SOLUTION INTRAVENOUS; SUBCUTANEOUS
Qty: 45 ML | Refills: 3 | Status: SHIPPED | OUTPATIENT
Start: 2024-01-04

## 2024-01-19 ENCOUNTER — APPOINTMENT (OUTPATIENT)
Dept: CARDIOLOGY | Facility: CLINIC | Age: 89
End: 2024-01-19
Payer: MEDICARE

## 2024-01-22 ENCOUNTER — APPOINTMENT (OUTPATIENT)
Dept: PRIMARY CARE | Facility: CLINIC | Age: 89
End: 2024-01-22
Payer: MEDICARE

## 2024-01-24 ENCOUNTER — ANTICOAGULATION - WARFARIN VISIT (OUTPATIENT)
Dept: CARDIOLOGY | Facility: CLINIC | Age: 89
End: 2024-01-24
Payer: MEDICARE

## 2024-01-24 DIAGNOSIS — I48.91 ATRIAL FIBRILLATION, UNSPECIFIED TYPE (MULTI): ICD-10-CM

## 2024-01-26 ENCOUNTER — ANTICOAGULATION - WARFARIN VISIT (OUTPATIENT)
Dept: CARDIOLOGY | Facility: CLINIC | Age: 89
End: 2024-01-26
Payer: MEDICARE

## 2024-01-26 DIAGNOSIS — I48.91 ATRIAL FIBRILLATION, UNSPECIFIED TYPE (MULTI): ICD-10-CM

## 2024-01-26 LAB
POC INR: 2.9
POC PROTHROMBIN TIME: NORMAL

## 2024-01-26 PROCEDURE — 85610 PROTHROMBIN TIME: CPT | Mod: QW

## 2024-01-26 PROCEDURE — 99211 OFF/OP EST MAY X REQ PHY/QHP: CPT | Performed by: INTERNAL MEDICINE

## 2024-01-26 NOTE — PROGRESS NOTES
Patient identification verified with 2 identifiers.    Location: [facility] - [address] [place] [address] [phone]     Referring Physician:  DR TAPIA  Enrollment/ Re-enrollment date: 24   INR Goal: 2.0-3.0  INR monitoring is per [facility] protocol.  Anticoagulation Medication: warfarin  Indication: atrial fibrillation    Subjective   Bleeding signs/symptoms: No    Bruising: No   Major bleeding event: No  Thrombosis signs/symptoms: No  Thromboembolic event: No  Missed doses: No  Extra doses: No  Medication changes: No  Dietary changes: No  Change in health: No  Change in activity: No  Alcohol: No  Other concerns: No    Upcoming Surgeries:  Does the Patient Have any upcoming surgeries that require interruption in anticoagulation therapy? no  Does the patient require bridging? no      Anticoagulation Summary  As of 2024      INR goal:  2.0-3.0   TTR:  100.0 % (3.6 mo)   INR used for dosin.90 (2024)   Weekly warfarin total:  45 mg               Assessment/Plan   Therapeutic     1. New dose: no change    2. Next INR: 1 month      Education provided to patient during the visit:  Patient instructed to call in interim with questions, concerns and changes.

## 2024-02-16 DIAGNOSIS — E78.2 MIXED HYPERLIPIDEMIA: ICD-10-CM

## 2024-02-16 RX ORDER — ATORVASTATIN CALCIUM 20 MG/1
20 TABLET, FILM COATED ORAL DAILY
Qty: 90 TABLET | Refills: 3 | Status: SHIPPED | OUTPATIENT
Start: 2024-02-16

## 2024-02-19 DIAGNOSIS — Z79.4 TYPE 2 DIABETES MELLITUS WITH HYPERGLYCEMIA, WITH LONG-TERM CURRENT USE OF INSULIN (MULTI): ICD-10-CM

## 2024-02-19 DIAGNOSIS — E11.65 TYPE 2 DIABETES MELLITUS WITH HYPERGLYCEMIA, WITH LONG-TERM CURRENT USE OF INSULIN (MULTI): ICD-10-CM

## 2024-02-19 DIAGNOSIS — I10 PRIMARY HYPERTENSION: Primary | ICD-10-CM

## 2024-02-19 RX ORDER — BLOOD SUGAR DIAGNOSTIC
STRIP MISCELLANEOUS
Qty: 300 STRIP | Refills: 1 | Status: SHIPPED | OUTPATIENT
Start: 2024-02-19 | End: 2024-02-28 | Stop reason: SDUPTHER

## 2024-02-20 NOTE — TELEPHONE ENCOUNTER
Requested Prescriptions     Pending Prescriptions Disp Refills    carvedilol (Coreg) 3.125 mg tablet 180 tablet 3     Sig: Take 1 tablet (3.125 mg) by mouth 2 times a day.     Please send a refill of patient's medication as soon as possible.  Comment: PATIENT IS OUT OF MEDICATION  Thank you.

## 2024-02-21 RX ORDER — CARVEDILOL 3.12 MG/1
3.12 TABLET ORAL 2 TIMES DAILY
Qty: 180 TABLET | Refills: 3 | Status: SHIPPED | OUTPATIENT
Start: 2024-02-21 | End: 2025-02-15

## 2024-02-23 ENCOUNTER — ANTICOAGULATION - WARFARIN VISIT (OUTPATIENT)
Dept: CARDIOLOGY | Facility: CLINIC | Age: 89
End: 2024-02-23
Payer: MEDICARE

## 2024-02-23 DIAGNOSIS — I48.91 ATRIAL FIBRILLATION, UNSPECIFIED TYPE (MULTI): ICD-10-CM

## 2024-02-23 LAB
POC INR: 2.2
POC PROTHROMBIN TIME: NORMAL

## 2024-02-23 PROCEDURE — 99211 OFF/OP EST MAY X REQ PHY/QHP: CPT | Performed by: INTERNAL MEDICINE

## 2024-02-23 PROCEDURE — 85610 PROTHROMBIN TIME: CPT | Mod: QW

## 2024-02-23 NOTE — PROGRESS NOTES
Patient identification verified with 2 identifiers.    Location: [facility] - [address] [place] [address] [phone]     Referring Physician:  DR TAPIA  Enrollment/ Re-enrollment date: 24   INR Goal: 2.0-3.0  INR monitoring is per [facility] protocol.  Anticoagulation Medication: warfarin  Indication: atrial fibrillation    Subjective   Bleeding signs/symptoms: No    Bruising: No   Major bleeding event: No  Thrombosis signs/symptoms: No  Thromboembolic event: No  Missed doses: No  Extra doses: No  Medication changes: No  Dietary changes: No  Change in health: No  Change in activity: No  Alcohol: No  Other concerns: No    Upcoming Surgeries:  Does the Patient Have any upcoming surgeries that require interruption in anticoagulation therapy? no  Does the patient require bridging? no      Anticoagulation Summary  As of 2024      INR goal:  2.0-3.0   TTR:  100.0 % (4.6 mo)   INR used for dosin.20 (2024)   Weekly warfarin total:  45 mg               Assessment/Plan   Therapeutic     1. New dose: no change    2. Next INR: 1 month      Education provided to patient during the visit:  Patient instructed to call in interim with questions, concerns and changes.

## 2024-02-27 NOTE — PROGRESS NOTES
HPI   89yo previously of Dr. Fraire practice with dm2, htn ckd, cva, dyslipidemia presents in f/u. A1c was 6.4%, today 6.9%. Pt testing 3 times per day, denies lows, 110-130s on waking and before lunch, 140-150's before dinner. Pt following a carb controlled diet and knows reasonable carb allowances. Lives with grandson, Pantera, and he accompanied her to this visit today. Reviewed use/benefits of augusto 3 CGM, she agrees to use and meets criteria for coverage.           Taking basaglar 20 units am, trulicity 1.5mg , humalog 4 units at meals.           Taking atorvastatin 20mg for lipids and tolerating.         Taking torsemide, carvedilol, hydralazine for htn, following with nephrology Dr. Portillo.      Current Outpatient Medications:     acetaminophen (Tylenol) 325 mg tablet, Take 1 tablet (325 mg) by mouth., Disp: , Rfl:     atorvastatin (Lipitor) 20 mg tablet, TAKE 1 TABLET BY MOUTH EVERY DAY, Disp: 90 tablet, Rfl: 3    blood sugar diagnostic (Accu-Chek SmartView Test Strip) strip, USE 1 STRIP INTO VITRO, 3 TIMES A DAY-- E11.65, Disp: 300 strip, Rfl: 1    calcitriol (Rocaltrol) 0.25 mcg capsule, Take 1 capsule (0.25 mcg) by mouth once daily., Disp: , Rfl:     calcium carbonate-vitamin D3 500 mg-5 mcg (200 unit) tablet, Os-Joselo 500 + D 500-200 MG-UNIT TABS  Refills: 0     Active, Disp: , Rfl:     carvedilol (Coreg) 3.125 mg tablet, Take 1 tablet (3.125 mg) by mouth 2 times a day., Disp: 180 tablet, Rfl: 3    dulaglutide (Trulicity) 1.5 mg/0.5 mL pen injector injection, Inject 1.5 mg under the skin 1 (one) time per week. As directed, Disp: 2 mL, Rfl: 1    hydrALAZINE (Apresoline) 50 mg tablet, Take 1 tablet (50 mg) by mouth 2 times a day., Disp: , Rfl:     insulin glargine (Basaglar KwikPen U-100 Insulin) 100 unit/mL (3 mL) pen, Inject 30 Units under the skin once daily in the morning. As directed, Disp: 27 mL, Rfl: 3    insulin lispro (HumaLOG KwikPen Insulin) 100 unit/mL injection, Up to 30 units daily As directed,  "Disp: 45 mL, Rfl: 3    isosorbide mononitrate ER (Imdur) 60 mg 24 hr tablet, Take 1 tablet (60 mg) by mouth once daily., Disp: , Rfl:     latanoprost (Xalatan) 0.005 % ophthalmic solution, Administer 1 drop into affected eye(s) once daily at bedtime., Disp: , Rfl:     lubricating eye drops (polyvinyl alcohol-povidon,PF,) ophthalmic solution, Administer into affected eye(s)., Disp: , Rfl:     omeprazole (PriLOSEC) 20 mg DR capsule, Take 1 capsule (20 mg) by mouth once daily in the morning. Take before meals. 30 minutes before, Disp: , Rfl:     pen needle, diabetic 32 gauge x 5/32\" needle, 1 Needle 4 times a day., Disp: 400 each, Rfl: 1    potassium chloride CR 10 mEq ER tablet, Take 1 tablet (10 mEq) by mouth 2 times a day., Disp: , Rfl:     timolol (Timoptic) 0.5 % ophthalmic solution, Administer 1 drop into both eyes once daily in the morning. *NEED APPT FOR FUTURE FILLS*, Disp: , Rfl:     torsemide (Demadex) 100 mg tablet, TAKE 1/2 TABLET BY MOUTH TWICE DAILY 90, Disp: 90 tablet, Rfl: 1    VITAMIN B COMPLEX ORAL, Take by mouth. As directed, Disp: , Rfl:     warfarin (Coumadin) 6 mg tablet, Take 1 tablet (6 mg) by mouth. 1 1/2 friday; 6 mg rest of wk, Disp: , Rfl:     ferrous gluconate (Ferate) 240 (27 Fe) MG tablet, Take 1 tablet (27 mg) by mouth every other day., Disp: 45 tablet, Rfl: 1    Allergies as of 02/28/2024    (No Known Allergies)       /78 (BP Location: Right arm, Patient Position: Sitting)   Pulse 52   Wt 100 kg (220 lb 6.4 oz)   LMP  (LMP Unknown)   BMI 37.83 kg/m²     Labs:   Lab Results   Component Value Date    WBC 7.4 11/27/2023    NRBC 0.0 11/27/2023    RBC 4.00 11/27/2023    HGB 13.0 11/27/2023    HCT 39.7 11/27/2023     11/27/2023     Lab Results   Component Value Date    CALCIUM 9.9 11/27/2023    AST 23 11/27/2023    ALKPHOS 89 11/27/2023    BILITOT 0.3 11/27/2023    PROT 7.8 11/27/2023    ALBUMIN 3.7 11/27/2023    GLOB 4.0 (H) 11/04/2022    GLOB 4.0 (H) 11/04/2022    AGR 0.8 " "(L) 11/04/2022    AGR 0.8 (L) 11/04/2022     11/27/2023    K 3.7 11/27/2023     11/27/2023    CO2 30 11/27/2023    ANIONGAP 11 11/27/2023    BUN 30 (H) 11/27/2023    CREATININE 1.70 (H) 11/27/2023    UREACREAUR 14.1 09/26/2023    GLUCOSE 147 (H) 11/27/2023    ALT 19 11/27/2023    EGFR 29 (L) 11/27/2023     Lab Results   Component Value Date    CHOL 133 11/04/2022    CHOL 133 11/04/2022    TRIG 82 11/04/2022    TRIG 82 11/04/2022    HDL 42 (L) 11/04/2022    HDL 42 (L) 11/04/2022    LDLCALC 75 11/04/2022    LDLCALC 75 11/04/2022     No results found for: \"MICROALBCREA\"  Lab Results   Component Value Date    TSH 6.30 (H) 02/16/2022     Lab Results   Component Value Date    CHHSYCAC94 575 11/06/2023     Lab Results   Component Value Date    HGBA1C 6.4 02/28/2024         Assessment/Plan   1. Type 2 diabetes mellitus with hyperglycemia, with long-term current use of insulin (CMS/[facility])  -A1c ordered and reviewed  -most blood sugars between 100-200 without hypoglycemia  -no changes in therapy indicated  -reviewed symptoms, treatment and prevention of hypoglycemia, reinforced carrying glucose source at all times.  -reviewed site selection and rotation; no lipohypertrophy noted on exam    Patient has been using a blood glucose monitor and performing frequent testing, insulin treated with multiple daily injections and the treatment regime requires frequent adjustment based on blood sugars.   The CGM device will be used to lower the risk of hypoglycemia (including severe, very low and nocturnal)  The CGM device will be used to adjust insulin dosing based on glucose data, food intake, activity and glucose trends  Patient is adherent to diabetic treatment plan and participates in ongoing education and follow up.  Patient is motivated and knowledgeable about use of continuous glucose monitor.   -call [employer] for [employer], call office when receive to schedule training      2. Essential hypertension  -above " target today, historically at target    3. Mixed hyperlipidemia  -on statin and tolerating      Follow up: 4 months theodora lloyd    -labs/tests/notes reviewed  -reviewed and counseled patient on medication monitoring and side effects    Treatment and plan discussed with Dr. Hlems. Tiana RUSSO Certified Diabetes Care and     Medical Decision Making  Complexity of problem: Chronic illness of diabetes mellitus uncontrolled, progressing  Data analyzed and reviewed: Reviewed prior notes, blood glucose data, labs including HgbA1c, lipids, serum chemistries.  Ordered tests.   Risk of complications and morbidities: Is definite because of use of insulin and risk of hypoglycemia.  Prescription medications reviewed and modifications made.  Compliance assessed.  Addressed social determinants of health including food insecurity.

## 2024-02-28 ENCOUNTER — CLINICAL SUPPORT (OUTPATIENT)
Dept: ENDOCRINOLOGY | Facility: CLINIC | Age: 89
End: 2024-02-28
Payer: MEDICARE

## 2024-02-28 VITALS
DIASTOLIC BLOOD PRESSURE: 78 MMHG | BODY MASS INDEX: 37.83 KG/M2 | HEART RATE: 52 BPM | WEIGHT: 220.4 LBS | SYSTOLIC BLOOD PRESSURE: 150 MMHG

## 2024-02-28 DIAGNOSIS — E11.65 TYPE 2 DIABETES MELLITUS WITH HYPERGLYCEMIA, WITH LONG-TERM CURRENT USE OF INSULIN (MULTI): Primary | ICD-10-CM

## 2024-02-28 DIAGNOSIS — E78.2 MIXED HYPERLIPIDEMIA: ICD-10-CM

## 2024-02-28 DIAGNOSIS — I10 ESSENTIAL HYPERTENSION: ICD-10-CM

## 2024-02-28 DIAGNOSIS — Z79.4 TYPE 2 DIABETES MELLITUS WITH HYPERGLYCEMIA, WITH LONG-TERM CURRENT USE OF INSULIN (MULTI): Primary | ICD-10-CM

## 2024-02-28 LAB — POC HEMOGLOBIN A1C: 6.4 % (ref 4.2–6.5)

## 2024-02-28 PROCEDURE — 99214 OFFICE O/P EST MOD 30 MIN: CPT | Performed by: INTERNAL MEDICINE

## 2024-02-28 PROCEDURE — 83036 HEMOGLOBIN GLYCOSYLATED A1C: CPT | Performed by: INTERNAL MEDICINE

## 2024-02-28 RX ORDER — BLOOD SUGAR DIAGNOSTIC
STRIP MISCELLANEOUS
Qty: 300 STRIP | Refills: 1 | Status: SHIPPED | OUTPATIENT
Start: 2024-02-28

## 2024-02-28 ASSESSMENT — PAIN SCALES - GENERAL: PAINLEVEL: 5

## 2024-02-28 NOTE — PATIENT INSTRUCTIONS
Call [employer] for the [employer] 3  When receive, call office for appointment to learn with pharmacist    Carry glucose source with you at all times, keep something at your bedside    No changes today with your insulin

## 2024-02-28 NOTE — PROGRESS NOTES
I, Dr David Helms, have reviewed this progress note, medication list, vital signs, any pertinent lab values, and any CGM data if present with the Certified Diabetes Care and  face to face during this visit today. This note reflects the treatment plan that was made under my direction after reviewing the above mentioned elements while face to face with the patient and CDE.  I personally answered and addressed any questions and concerns the patient had during the visit today.  The CDE entered the data in this note under my direction and I personally reviewed it, signed any lab or medication orders that I instructed to be completed. I am the billing provider for this visit and the level of service was determined by my involvement in the Medical Decision Making Component of this visit while face to face with the patient.    Electronically signed by David Helms MD on 2/28/24 at 3:25 PM.

## 2024-03-07 DIAGNOSIS — I10 ESSENTIAL HYPERTENSION: ICD-10-CM

## 2024-03-07 RX ORDER — HYDRALAZINE HYDROCHLORIDE 50 MG/1
50 TABLET, FILM COATED ORAL 2 TIMES DAILY
Qty: 180 TABLET | Refills: 3 | Status: SHIPPED | OUTPATIENT
Start: 2024-03-07 | End: 2025-03-02

## 2024-03-07 NOTE — TELEPHONE ENCOUNTER
Pt calling for a refill     Requested Prescriptions     Pending Prescriptions Disp Refills    hydrALAZINE (Apresoline) 50 mg tablet 180 tablet 3     Sig: Take 1 tablet (50 mg) by mouth 2 times a day.

## 2024-03-13 ENCOUNTER — APPOINTMENT (OUTPATIENT)
Dept: ENDOCRINOLOGY | Facility: CLINIC | Age: 89
End: 2024-03-13
Payer: MEDICARE

## 2024-03-13 NOTE — PROGRESS NOTES
HPI             Current Outpatient Medications:     acetaminophen (Tylenol) 325 mg tablet, Take 1 tablet (325 mg) by mouth., Disp: , Rfl:     atorvastatin (Lipitor) 20 mg tablet, TAKE 1 TABLET BY MOUTH EVERY DAY, Disp: 90 tablet, Rfl: 3    blood sugar diagnostic (Accu-Chek SmartView Test Strip) strip, USE 1 STRIP INTO VITRO, 3 TIMES A DAY-- E11.65, Disp: 300 strip, Rfl: 1    calcitriol (Rocaltrol) 0.25 mcg capsule, Take 1 capsule (0.25 mcg) by mouth once daily., Disp: , Rfl:     calcium carbonate-vitamin D3 500 mg-5 mcg (200 unit) tablet, Os-Joselo 500 + D 500-200 MG-UNIT TABS  Refills: 0     Active, Disp: , Rfl:     carvedilol (Coreg) 3.125 mg tablet, Take 1 tablet (3.125 mg) by mouth 2 times a day., Disp: 180 tablet, Rfl: 3    dulaglutide (Trulicity) 1.5 mg/0.5 mL pen injector injection, Inject 1.5 mg under the skin 1 (one) time per week. As directed, Disp: 2 mL, Rfl: 1    ferrous gluconate (Ferate) 240 (27 Fe) MG tablet, Take 1 tablet (27 mg) by mouth every other day., Disp: 45 tablet, Rfl: 1    hydrALAZINE (Apresoline) 50 mg tablet, Take 1 tablet (50 mg) by mouth 2 times a day., Disp: 180 tablet, Rfl: 3    insulin glargine (Basaglar KwikPen U-100 Insulin) 100 unit/mL (3 mL) pen, Inject 30 Units under the skin once daily in the morning. As directed, Disp: 27 mL, Rfl: 3    insulin lispro (HumaLOG KwikPen Insulin) 100 unit/mL injection, Up to 30 units daily As directed, Disp: 45 mL, Rfl: 3    isosorbide mononitrate ER (Imdur) 60 mg 24 hr tablet, Take 1 tablet (60 mg) by mouth once daily., Disp: , Rfl:     latanoprost (Xalatan) 0.005 % ophthalmic solution, Administer 1 drop into affected eye(s) once daily at bedtime., Disp: , Rfl:     lubricating eye drops (polyvinyl alcohol-povidon,PF,) ophthalmic solution, Administer into affected eye(s)., Disp: , Rfl:     omeprazole (PriLOSEC) 20 mg DR capsule, Take 1 capsule (20 mg) by mouth once daily in the morning. Take before meals. 30 minutes before, Disp: , Rfl:     pen  "needle, diabetic 32 gauge x 5/32\" needle, 1 Needle 4 times a day., Disp: 400 each, Rfl: 1    potassium chloride CR 10 mEq ER tablet, Take 1 tablet (10 mEq) by mouth 2 times a day., Disp: , Rfl:     timolol (Timoptic) 0.5 % ophthalmic solution, Administer 1 drop into both eyes once daily in the morning. *NEED APPT FOR FUTURE FILLS*, Disp: , Rfl:     torsemide (Demadex) 100 mg tablet, TAKE 1/2 TABLET BY MOUTH TWICE DAILY 90, Disp: 90 tablet, Rfl: 1    VITAMIN B COMPLEX ORAL, Take by mouth. As directed, Disp: , Rfl:     warfarin (Coumadin) 6 mg tablet, Take 1 tablet (6 mg) by mouth. 1 1/2 friday; 6 mg rest of wk, Disp: , Rfl:     Allergies as of 03/13/2024    (No Known Allergies)       LMP  (LMP Unknown)     Labs:   Lab Results   Component Value Date    WBC 7.4 11/27/2023    NRBC 0.0 11/27/2023    RBC 4.00 11/27/2023    HGB 13.0 11/27/2023    HCT 39.7 11/27/2023     11/27/2023     Lab Results   Component Value Date    CALCIUM 9.9 11/27/2023    AST 23 11/27/2023    ALKPHOS 89 11/27/2023    BILITOT 0.3 11/27/2023    PROT 7.8 11/27/2023    ALBUMIN 3.7 11/27/2023    GLOB 4.0 (H) 11/04/2022    GLOB 4.0 (H) 11/04/2022    AGR 0.8 (L) 11/04/2022    AGR 0.8 (L) 11/04/2022     11/27/2023    K 3.7 11/27/2023     11/27/2023    CO2 30 11/27/2023    ANIONGAP 11 11/27/2023    BUN 30 (H) 11/27/2023    CREATININE 1.70 (H) 11/27/2023    UREACREAUR 14.1 09/26/2023    GLUCOSE 147 (H) 11/27/2023    ALT 19 11/27/2023    EGFR 29 (L) 11/27/2023     Lab Results   Component Value Date    CHOL 133 11/04/2022    CHOL 133 11/04/2022    TRIG 82 11/04/2022    TRIG 82 11/04/2022    HDL 42 (L) 11/04/2022    HDL 42 (L) 11/04/2022    LDLCALC 75 11/04/2022    LDLCALC 75 11/04/2022     No results found for: \"MICROALBCREA\"  Lab Results   Component Value Date    TSH 6.30 (H) 02/16/2022     Lab Results   Component Value Date    [id number] 575 11/06/2023     Lab Results   Component Value Date    HGBA1C 6.4 02/28/2024         Assessment/Plan "   There are no diagnoses linked to this encounter.        Follow up: ***    -labs/tests/notes reviewed  -reviewed and counseled patient on medication monitoring and side effects    Treatment and plan discussed with Dr. Helms. Tiana RUSSO Certified Diabetes Care and

## 2024-03-14 ENCOUNTER — CLINICAL SUPPORT (OUTPATIENT)
Dept: ENDOCRINOLOGY | Facility: CLINIC | Age: 89
End: 2024-03-14
Payer: MEDICARE

## 2024-03-14 DIAGNOSIS — Z79.4 TYPE 2 DIABETES MELLITUS WITH HYPERGLYCEMIA, WITH LONG-TERM CURRENT USE OF INSULIN (MULTI): Primary | ICD-10-CM

## 2024-03-14 DIAGNOSIS — E11.65 TYPE 2 DIABETES MELLITUS WITH HYPERGLYCEMIA, WITH LONG-TERM CURRENT USE OF INSULIN (MULTI): Primary | ICD-10-CM

## 2024-03-14 NOTE — PATIENT INSTRUCTIONS
Check sugar on the Xochitl reader often - upon waking, before/after meals, bedtime, etc.   Follow readings closely,  learning which meals/snacks cause higher blood sugars

## 2024-03-14 NOTE — PROGRESS NOTES
HPI     89yo previously of Dr. Fraire practice with dm2, htn ckd, cva, dyslipidemia presents in f/u. A1c- last visit 6.4%.  Pt following a carb controlled diet and knows reasonable carb allowances.     Lives with Pantera abad, and he accompanied her to this visit.  They bring in with them Xochitl 3 supplies from [employer] for help with personal start today.    Does not have smartphone,  trained using [employer].         Current Outpatient Medications:     acetaminophen (Tylenol) 325 mg tablet, Take 1 tablet (325 mg) by mouth., Disp: , Rfl:     atorvastatin (Lipitor) 20 mg tablet, TAKE 1 TABLET BY MOUTH EVERY DAY, Disp: 90 tablet, Rfl: 3    blood sugar diagnostic (Accu-Chek SmartView Test Strip) strip, USE 1 STRIP INTO VITRO, 3 TIMES A DAY-- E11.65, Disp: 300 strip, Rfl: 1    calcitriol (Rocaltrol) 0.25 mcg capsule, Take 1 capsule (0.25 mcg) by mouth once daily., Disp: , Rfl:     calcium carbonate-vitamin D3 500 mg-5 mcg (200 unit) tablet, Os-Joselo 500 + D 500-200 MG-UNIT TABS  Refills: 0     Active, Disp: , Rfl:     carvedilol (Coreg) 3.125 mg tablet, Take 1 tablet (3.125 mg) by mouth 2 times a day., Disp: 180 tablet, Rfl: 3    dulaglutide (Trulicity) 1.5 mg/0.5 mL pen injector injection, Inject 1.5 mg under the skin 1 (one) time per week. As directed, Disp: 2 mL, Rfl: 1    ferrous gluconate (Ferate) 240 (27 Fe) MG tablet, Take 1 tablet (27 mg) by mouth every other day., Disp: 45 tablet, Rfl: 1    hydrALAZINE (Apresoline) 50 mg tablet, Take 1 tablet (50 mg) by mouth 2 times a day., Disp: 180 tablet, Rfl: 3    insulin glargine (Basaglar KwikPen U-100 Insulin) 100 unit/mL (3 mL) pen, Inject 30 Units under the skin once daily in the morning. As directed, Disp: 27 mL, Rfl: 3    insulin lispro (HumaLOG KwikPen Insulin) 100 unit/mL injection, Up to 30 units daily As directed, Disp: 45 mL, Rfl: 3    isosorbide mononitrate ER (Imdur) 60 mg 24 hr tablet, Take 1 tablet (60 mg) by mouth once daily., Disp: , Rfl:      "latanoprost (Xalatan) 0.005 % ophthalmic solution, Administer 1 drop into affected eye(s) once daily at bedtime., Disp: , Rfl:     lubricating eye drops (polyvinyl alcohol-povidon,PF,) ophthalmic solution, Administer into affected eye(s)., Disp: , Rfl:     omeprazole (PriLOSEC) 20 mg DR capsule, Take 1 capsule (20 mg) by mouth once daily in the morning. Take before meals. 30 minutes before, Disp: , Rfl:     pen needle, diabetic 32 gauge x 5/32\" needle, 1 Needle 4 times a day., Disp: 400 each, Rfl: 1    potassium chloride CR 10 mEq ER tablet, Take 1 tablet (10 mEq) by mouth 2 times a day., Disp: , Rfl:     timolol (Timoptic) 0.5 % ophthalmic solution, Administer 1 drop into both eyes once daily in the morning. *NEED APPT FOR FUTURE FILLS*, Disp: , Rfl:     torsemide (Demadex) 100 mg tablet, TAKE 1/2 TABLET BY MOUTH TWICE DAILY 90, Disp: 90 tablet, Rfl: 1    VITAMIN B COMPLEX ORAL, Take by mouth. As directed, Disp: , Rfl:     warfarin (Coumadin) 6 mg tablet, Take 1 tablet (6 mg) by mouth. 1 1/2 friday; 6 mg rest of wk, Disp: , Rfl:     Allergies as of 03/14/2024    (No Known Allergies)       Labs:   Lab Results   Component Value Date    WBC 7.4 11/27/2023    NRBC 0.0 11/27/2023    RBC 4.00 11/27/2023    HGB 13.0 11/27/2023    HCT 39.7 11/27/2023     11/27/2023     Lab Results   Component Value Date    CALCIUM 9.9 11/27/2023    AST 23 11/27/2023    ALKPHOS 89 11/27/2023    BILITOT 0.3 11/27/2023    PROT 7.8 11/27/2023    ALBUMIN 3.7 11/27/2023    GLOB 4.0 (H) 11/04/2022    GLOB 4.0 (H) 11/04/2022    AGR 0.8 (L) 11/04/2022    AGR 0.8 (L) 11/04/2022     11/27/2023    K 3.7 11/27/2023     11/27/2023    CO2 30 11/27/2023    ANIONGAP 11 11/27/2023    BUN 30 (H) 11/27/2023    CREATININE 1.70 (H) 11/27/2023    UREACREAUR 14.1 09/26/2023    GLUCOSE 147 (H) 11/27/2023    ALT 19 11/27/2023    EGFR 29 (L) 11/27/2023     Lab Results   Component Value Date    CHOL 133 11/04/2022    CHOL 133 11/04/2022    TRIG 82 " 11/04/2022    TRIG 82 11/04/2022    HDL 42 (L) 11/04/2022    HDL 42 (L) 11/04/2022    LDLCALC 75 11/04/2022    LDLCALC 75 11/04/2022     Lab Results   Component Value Date    HGBA1C 6.4 02/28/2024         Assessment/Plan   1. Type 2 diabetes mellitus with hyperglycemia, with long-term current use of insulin (CMS/[facility])    The patient will benefit from insight using CGM - learning which meals / snacks cause high sugars, identifying and appropriately managing low sugars, etc.   CGM data will also allow us to make necessary adjustments to the patient's insulin / medication regimen at future follow up.     Patient received the following instructions for Xochitl 3 personal start:  Sensor application and start up of sensor; aware to change in 14 days. Products for better adhesion; skin tac and simpatch. Expected differences in sensor glucose and blood glucose; always check with meter if symptoms do not match sensor glucose or if magnify glass appears on display. Trend arrows. Benavides functions: target ranges set to ; alerts set if applicable. Remove for MRI, CAT scan and X-ray. Call [employer] for problems with sensor, they will replace. Patient correctly applied sensor to back of upper arm and sensor session started.      Follow up: June as previously scheduled with Iman       CGM training and education provided by RENNY Greer, PharmD, BC-ADM, CDCES.

## 2024-03-22 ENCOUNTER — ANTICOAGULATION - WARFARIN VISIT (OUTPATIENT)
Dept: CARDIOLOGY | Facility: CLINIC | Age: 89
End: 2024-03-22
Payer: MEDICARE

## 2024-03-22 DIAGNOSIS — I48.91 ATRIAL FIBRILLATION, UNSPECIFIED TYPE (MULTI): ICD-10-CM

## 2024-03-22 LAB
POC INR: 1.8
POC PROTHROMBIN TIME: NORMAL

## 2024-03-22 PROCEDURE — 85610 PROTHROMBIN TIME: CPT | Mod: QW

## 2024-03-22 PROCEDURE — 99211 OFF/OP EST MAY X REQ PHY/QHP: CPT | Performed by: INTERNAL MEDICINE

## 2024-03-22 NOTE — PROGRESS NOTES
Patient identification verified with 2 identifiers.    Location: [facility] - [address] [place] [address] [phone]     Referring Physician:  DR TAPIA  Enrollment/ Re-enrollment date: 24   INR Goal: 2.0-3.0  INR monitoring is per [facility] protocol.  Anticoagulation Medication: warfarin  Indication: atrial fibrillation    Subjective   Bleeding signs/symptoms: No    Bruising: No   Major bleeding event: No  Thrombosis signs/symptoms: No  Thromboembolic event: No  Missed doses: No  missed wednesday dose  Extra doses: No  Medication changes: No  Dietary changes: No  Change in health: No  Change in activity: No  Alcohol: No  Other concerns: No    Upcoming Surgeries:  Does the Patient Have any upcoming surgeries that require interruption in anticoagulation therapy? no  Does the patient require bridging? no      Anticoagulation Summary  As of 3/22/2024      INR goal:  2.0-3.0   TTR:  91.5 % (5.5 mo)   INR used for dosin.80 (3/22/2024)   Weekly warfarin total:  45 mg               Assessment/Plan   Therapeutic     1. New dose: one time extra dose  2. Next INR: 1 week      Education provided to patient during the visit:  Patient instructed to call in interim with questions, concerns and changes.

## 2024-04-05 ENCOUNTER — ANTICOAGULATION - WARFARIN VISIT (OUTPATIENT)
Dept: CARDIOLOGY | Facility: CLINIC | Age: 89
End: 2024-04-05
Payer: MEDICARE

## 2024-04-05 DIAGNOSIS — I48.91 ATRIAL FIBRILLATION, UNSPECIFIED TYPE (MULTI): ICD-10-CM

## 2024-04-05 LAB
POC INR: 2.7
POC PROTHROMBIN TIME: NORMAL

## 2024-04-05 PROCEDURE — 99211 OFF/OP EST MAY X REQ PHY/QHP: CPT | Performed by: INTERNAL MEDICINE

## 2024-04-05 PROCEDURE — 85610 PROTHROMBIN TIME: CPT | Mod: QW

## 2024-04-05 NOTE — PROGRESS NOTES
Patient identification verified with 2 identifiers.    Location: [facility] - [address] [place] [address] [phone]     Referring Physician:  DR TAPIA  Enrollment/ Re-enrollment date: 24   INR Goal: 2.0-3.0  INR monitoring is per [facility] protocol.  Anticoagulation Medication: warfarin  Indication: atrial fibrillation    Subjective   Bleeding signs/symptoms: No    Bruising: No   Major bleeding event: No  Thrombosis signs/symptoms: No  Thromboembolic event: No  Missed doses: No  Extra doses: No  Medication changes: No  Dietary changes: No  Change in health: No  Change in activity: No  Alcohol: No  Other concerns: No    Upcoming Surgeries:  Does the Patient Have any upcoming surgeries that require interruption in anticoagulation therapy? no  Does the patient require bridging? no      Anticoagulation Summary  As of 2024      INR goal:  2.0-3.0   TTR:  90.5 % (6 mo)   INR used for dosin.70 (2024)   Weekly warfarin total:  45 mg               Assessment/Plan   Therapeutic     1. New dose: no change  2. Next INR: 4 week      Education provided to patient during the visit:  Patient instructed to call in interim with questions, concerns and changes.

## 2024-04-10 ENCOUNTER — LAB (OUTPATIENT)
Dept: LAB | Facility: LAB | Age: 89
End: 2024-04-10
Payer: MEDICARE

## 2024-04-10 DIAGNOSIS — N18.4 CHRONIC KIDNEY DISEASE, STAGE 4 (SEVERE) (MULTI): Primary | ICD-10-CM

## 2024-04-10 DIAGNOSIS — N25.81 SECONDARY HYPERPARATHYROIDISM OF RENAL ORIGIN (MULTI): ICD-10-CM

## 2024-04-10 LAB
ALBUMIN SERPL-MCNC: 3.6 G/DL (ref 3.5–5)
ANION GAP SERPL CALC-SCNC: 13 MMOL/L
BUN SERPL-MCNC: 25 MG/DL (ref 8–25)
CALCIUM SERPL-MCNC: 9 MG/DL (ref 8.5–10.4)
CHLORIDE SERPL-SCNC: 101 MMOL/L (ref 97–107)
CO2 SERPL-SCNC: 28 MMOL/L (ref 24–31)
CREAT SERPL-MCNC: 1.8 MG/DL (ref 0.4–1.6)
EGFRCR SERPLBLD CKD-EPI 2021: 27 ML/MIN/1.73M*2
ERYTHROCYTE [DISTWIDTH] IN BLOOD BY AUTOMATED COUNT: 13.8 % (ref 11.5–14.5)
GLUCOSE SERPL-MCNC: 192 MG/DL (ref 65–99)
HCT VFR BLD AUTO: 38.5 % (ref 36–46)
HGB BLD-MCNC: 12.1 G/DL (ref 12–16)
MCH RBC QN AUTO: 31.9 PG (ref 26–34)
MCHC RBC AUTO-ENTMCNC: 31.4 G/DL (ref 32–36)
MCV RBC AUTO: 102 FL (ref 80–100)
NRBC BLD-RTO: 0 /100 WBCS (ref 0–0)
PHOSPHATE SERPL-MCNC: 3.3 MG/DL (ref 2.5–4.5)
PLATELET # BLD AUTO: 194 X10*3/UL (ref 150–450)
POTASSIUM SERPL-SCNC: 4 MMOL/L (ref 3.4–5.1)
PTH-INTACT SERPL-MCNC: 123.3 PG/ML (ref 18.5–88)
RBC # BLD AUTO: 3.79 X10*6/UL (ref 4–5.2)
SODIUM SERPL-SCNC: 142 MMOL/L (ref 133–145)
WBC # BLD AUTO: 7.1 X10*3/UL (ref 4.4–11.3)

## 2024-04-10 PROCEDURE — 80069 RENAL FUNCTION PANEL: CPT

## 2024-04-10 PROCEDURE — 36415 COLL VENOUS BLD VENIPUNCTURE: CPT

## 2024-04-10 PROCEDURE — 85027 COMPLETE CBC AUTOMATED: CPT

## 2024-04-10 PROCEDURE — 83970 ASSAY OF PARATHORMONE: CPT

## 2024-04-26 ENCOUNTER — APPOINTMENT (OUTPATIENT)
Dept: PRIMARY CARE | Facility: CLINIC | Age: 89
End: 2024-04-26
Payer: MEDICARE

## 2024-04-29 DIAGNOSIS — I50.30 DIASTOLIC CONGESTIVE HEART FAILURE, UNSPECIFIED HF CHRONICITY (MULTI): ICD-10-CM

## 2024-04-30 ENCOUNTER — LAB (OUTPATIENT)
Dept: LAB | Facility: LAB | Age: 89
End: 2024-04-30
Payer: MEDICARE

## 2024-04-30 DIAGNOSIS — D53.9 MACROCYTIC ANEMIA: ICD-10-CM

## 2024-04-30 LAB
ALBUMIN SERPL-MCNC: 3.7 G/DL (ref 3.5–5)
ALP BLD-CCNC: 87 U/L (ref 35–125)
ALT SERPL-CCNC: 17 U/L (ref 5–40)
ANION GAP SERPL CALC-SCNC: 14 MMOL/L
AST SERPL-CCNC: 17 U/L (ref 5–40)
BASOPHILS # BLD AUTO: 0.05 X10*3/UL (ref 0–0.1)
BASOPHILS NFR BLD AUTO: 0.6 %
BILIRUB SERPL-MCNC: 0.5 MG/DL (ref 0.1–1.2)
BUN SERPL-MCNC: 36 MG/DL (ref 8–25)
CALCIUM SERPL-MCNC: 9.3 MG/DL (ref 8.5–10.4)
CHLORIDE SERPL-SCNC: 100 MMOL/L (ref 97–107)
CO2 SERPL-SCNC: 30 MMOL/L (ref 24–31)
CREAT SERPL-MCNC: 1.9 MG/DL (ref 0.4–1.6)
EGFRCR SERPLBLD CKD-EPI 2021: 25 ML/MIN/1.73M*2
EOSINOPHIL # BLD AUTO: 0.16 X10*3/UL (ref 0–0.4)
EOSINOPHIL NFR BLD AUTO: 2 %
ERYTHROCYTE [DISTWIDTH] IN BLOOD BY AUTOMATED COUNT: 13.7 % (ref 11.5–14.5)
FERRITIN SERPL-MCNC: 156 NG/ML (ref 13–150)
FOLATE SERPL-MCNC: >20 NG/ML (ref 4.2–19.9)
GLUCOSE SERPL-MCNC: 176 MG/DL (ref 65–99)
HCT VFR BLD AUTO: 40.3 % (ref 36–46)
HGB BLD-MCNC: 12.6 G/DL (ref 12–16)
HGB RETIC QN: 37 PG (ref 28–38)
IMM GRANULOCYTES # BLD AUTO: 0.03 X10*3/UL (ref 0–0.5)
IMM GRANULOCYTES NFR BLD AUTO: 0.4 % (ref 0–0.9)
IMMATURE RETIC FRACTION: 9.1 %
IRON SATN MFR SERPL: 34 % (ref 12–50)
IRON SERPL-MCNC: 84 UG/DL (ref 30–160)
LYMPHOCYTES # BLD AUTO: 2.57 X10*3/UL (ref 0.8–3)
LYMPHOCYTES NFR BLD AUTO: 31.5 %
MCH RBC QN AUTO: 32.1 PG (ref 26–34)
MCHC RBC AUTO-ENTMCNC: 31.3 G/DL (ref 32–36)
MCV RBC AUTO: 103 FL (ref 80–100)
MONOCYTES # BLD AUTO: 0.71 X10*3/UL (ref 0.05–0.8)
MONOCYTES NFR BLD AUTO: 8.7 %
NEUTROPHILS # BLD AUTO: 4.64 X10*3/UL (ref 1.6–5.5)
NEUTROPHILS NFR BLD AUTO: 56.8 %
NRBC BLD-RTO: 0 /100 WBCS (ref 0–0)
PLATELET # BLD AUTO: 217 X10*3/UL (ref 150–450)
POTASSIUM SERPL-SCNC: 4 MMOL/L (ref 3.4–5.1)
PROT SERPL-MCNC: 7.3 G/DL (ref 5.9–7.9)
RBC # BLD AUTO: 3.92 X10*6/UL (ref 4–5.2)
RETICS #: 0.05 X10*6/UL (ref 0.02–0.11)
RETICS/RBC NFR AUTO: 1.2 % (ref 0.5–2)
SODIUM SERPL-SCNC: 144 MMOL/L (ref 133–145)
TIBC SERPL-MCNC: 244 UG/DL (ref 228–428)
UIBC SERPL-MCNC: 160 UG/DL (ref 110–370)
VIT B12 SERPL-MCNC: 856 PG/ML (ref 211–946)
WBC # BLD AUTO: 8.2 X10*3/UL (ref 4.4–11.3)

## 2024-04-30 PROCEDURE — 36415 COLL VENOUS BLD VENIPUNCTURE: CPT

## 2024-04-30 PROCEDURE — 82607 VITAMIN B-12: CPT

## 2024-04-30 PROCEDURE — 83921 ORGANIC ACID SINGLE QUANT: CPT

## 2024-04-30 PROCEDURE — 82728 ASSAY OF FERRITIN: CPT

## 2024-04-30 PROCEDURE — 83540 ASSAY OF IRON: CPT

## 2024-04-30 PROCEDURE — 85045 AUTOMATED RETICULOCYTE COUNT: CPT

## 2024-04-30 PROCEDURE — 85025 COMPLETE CBC W/AUTO DIFF WBC: CPT

## 2024-04-30 PROCEDURE — 83550 IRON BINDING TEST: CPT

## 2024-04-30 PROCEDURE — 80053 COMPREHEN METABOLIC PANEL: CPT

## 2024-04-30 PROCEDURE — 82746 ASSAY OF FOLIC ACID SERUM: CPT

## 2024-05-02 ENCOUNTER — ANTICOAGULATION - WARFARIN VISIT (OUTPATIENT)
Dept: CARDIOLOGY | Facility: CLINIC | Age: 89
End: 2024-05-02
Payer: MEDICARE

## 2024-05-02 DIAGNOSIS — I48.91 ATRIAL FIBRILLATION, UNSPECIFIED TYPE (MULTI): ICD-10-CM

## 2024-05-02 LAB
POC INR: 2
POC PROTHROMBIN TIME: NORMAL

## 2024-05-02 PROCEDURE — 99211 OFF/OP EST MAY X REQ PHY/QHP: CPT | Performed by: INTERNAL MEDICINE

## 2024-05-02 PROCEDURE — 85610 PROTHROMBIN TIME: CPT | Mod: QW

## 2024-05-02 RX ORDER — TORSEMIDE 100 MG/1
50 TABLET ORAL 2 TIMES DAILY
Qty: 90 TABLET | Refills: 3 | Status: SHIPPED | OUTPATIENT
Start: 2024-05-02 | End: 2025-04-27

## 2024-05-02 NOTE — PROGRESS NOTES
Patient identification verified with 2 identifiers.    Location: [facility] - [address] [place] [address] [phone]     Referring Physician:  DR TAPIA  Enrollment/ Re-enrollment date: 24   INR Goal: 2.0-3.0  INR monitoring is per [facility] protocol.  Anticoagulation Medication: warfarin  Indication: atrial fibrillation    Subjective   Bleeding signs/symptoms: No    Bruising: No   Major bleeding event: No  Thrombosis signs/symptoms: No  Thromboembolic event: No  Missed doses: No  Extra doses: No  Medication changes: No  Dietary changes: No  Change in health: No  Change in activity: No  Alcohol: No  Other concerns: No    Upcoming Surgeries:  Does the Patient Have any upcoming surgeries that require interruption in anticoagulation therapy? no  Does the patient require bridging? no      Anticoagulation Summary  As of 2024      INR goal:  2.0-3.0   TTR:  91.7% (6.9 mo)   INR used for dosin.00 (2024)   Weekly warfarin total:  45 mg               Assessment/Plan   Therapeutic     1. New dose: no change  2. Next INR: 4 week      Education provided to patient during the visit:  Patient instructed to call in interim with questions, concerns and changes.

## 2024-05-02 NOTE — TELEPHONE ENCOUNTER
Pharmacy is requesting a refill on behalf of the pt     Requested Prescriptions     Pending Prescriptions Disp Refills    torsemide (Demadex) 100 mg tablet [Pharmacy Med Name: TORSEMIDE 100 MG TABLET] 90 tablet 3     Sig: Take 0.5 tablets (50 mg) by mouth 2 times a day.

## 2024-05-03 ENCOUNTER — OFFICE VISIT (OUTPATIENT)
Dept: HEMATOLOGY/ONCOLOGY | Facility: CLINIC | Age: 89
End: 2024-05-03
Payer: MEDICARE

## 2024-05-03 ENCOUNTER — APPOINTMENT (OUTPATIENT)
Dept: HEMATOLOGY/ONCOLOGY | Facility: CLINIC | Age: 89
End: 2024-05-03
Payer: MEDICARE

## 2024-05-03 VITALS
BODY MASS INDEX: 39.24 KG/M2 | SYSTOLIC BLOOD PRESSURE: 162 MMHG | DIASTOLIC BLOOD PRESSURE: 64 MMHG | TEMPERATURE: 97.7 F | RESPIRATION RATE: 20 BRPM | HEART RATE: 52 BPM | HEIGHT: 63 IN | OXYGEN SATURATION: 99 % | WEIGHT: 221.45 LBS

## 2024-05-03 DIAGNOSIS — D53.9 MACROCYTIC ANEMIA: Primary | ICD-10-CM

## 2024-05-03 PROCEDURE — 99214 OFFICE O/P EST MOD 30 MIN: CPT | Performed by: INTERNAL MEDICINE

## 2024-05-03 PROCEDURE — 3078F DIAST BP <80 MM HG: CPT | Performed by: INTERNAL MEDICINE

## 2024-05-03 PROCEDURE — 3077F SYST BP >= 140 MM HG: CPT | Performed by: INTERNAL MEDICINE

## 2024-05-03 PROCEDURE — 1159F MED LIST DOCD IN RCRD: CPT | Performed by: INTERNAL MEDICINE

## 2024-05-03 PROCEDURE — 1157F ADVNC CARE PLAN IN RCRD: CPT | Performed by: INTERNAL MEDICINE

## 2024-05-03 PROCEDURE — 1126F AMNT PAIN NOTED NONE PRSNT: CPT | Performed by: INTERNAL MEDICINE

## 2024-05-03 ASSESSMENT — PAIN SCALES - GENERAL: PAINLEVEL: 0-NO PAIN

## 2024-05-03 ASSESSMENT — PATIENT HEALTH QUESTIONNAIRE - PHQ9
SUM OF ALL RESPONSES TO PHQ9 QUESTIONS 1 AND 2: 0
2. FEELING DOWN, DEPRESSED OR HOPELESS: NOT AT ALL
1. LITTLE INTEREST OR PLEASURE IN DOING THINGS: NOT AT ALL

## 2024-05-03 ASSESSMENT — COLUMBIA-SUICIDE SEVERITY RATING SCALE - C-SSRS
1. IN THE PAST MONTH, HAVE YOU WISHED YOU WERE DEAD OR WISHED YOU COULD GO TO SLEEP AND NOT WAKE UP?: NO
6. HAVE YOU EVER DONE ANYTHING, STARTED TO DO ANYTHING, OR PREPARED TO DO ANYTHING TO END YOUR LIFE?: NO
2. HAVE YOU ACTUALLY HAD ANY THOUGHTS OF KILLING YOURSELF?: NO

## 2024-05-03 NOTE — PROGRESS NOTES
Pt seen in office today for an established patient/ new to provider follow up visit with Dr. Alivia Deluna for management of her macrocytic anemia.  She is  without complaints today and denies pain. She arrives to today's visit by her grandson, Pantera and patient arrives in a wheelchair.    Medications, pharmacy preference and allergies were reviewed with patient and updated in the medical record.     Per orders, labs were obtained on 4/30/24 and results are to be reviewed during visit with patient by provider. She no longer needs to be seen in this office, however patient was encouraged to contact us  with any any future needs, questions or concerns.    Our contact information was given to patient and they were encouraged to contact us with any questions or concerns. R    Patient verbalized understanding and agreement regarding discussed information via verbal feedback. Pt escorted to scheduling.

## 2024-05-03 NOTE — PROGRESS NOTES
Patient ID: Shoshana Thomas is a 88 y.o. female.    Subjective: DAYNE and vit b12 deficiency      Heme/Onc History:  This is an 88-year-old woman with a history of stage 4 chronic kidneydisease, diabetes, hypothyroidism, atrial fibrillation, hypertension and progressive macrocytic anemia in 2018diagnosed with B12 deficiency. Our evaluation  revealed macrocytic anemia, but white blood cell, subsets andplatelets were within normal limits. Initial evaluation noted modest iron deficiency as well as a B12 deficiency inlight of an elevated methylmalonic acid. She has been on B12 since February 2019, with monthly intramuscular  injections and has not had a trial of oral B12. She was also noted to have an EPO level between 40-50. She receivedIV Feraheme in September 2018. She has never required Procrit as her hemoglobin has remained over 10 and in fact,  more recently normal. Review of her ferritin values reveal a ferritin of above 130 on the September 30, 2019 with agradual decline to less than 80 in March 2021. She denies diarrhea or constipation. History of colonoscopy remotelyin the past. She does  admit to pagophagia and sometimes has chills, but denies coilonychia or tongue soreness. Shehas reflux symptoms, but no heartburn while taking omeprazole. She denies abdominal pain. There is no familyhistory of GI malignancy. There is a family history  of breast cancer, however, she denies breast lumps.     She has been taking oral B12 but not sublingually.  She had a follow-up with her nephrologist who estimates that in 2 years time she will be needing dialysis.  She has a chief complaint of arthritis particularly in her shoulders.  She has limited range  of motion.     No fevers, no night sweats.  No falls.  She occasionally has posterior nocturnal dyspnea.      Family History:     Family History   Problem Relation Name Age of Onset    Other (VAD) Mother      Diabetes Father      Other (Sepsis) Father      Other (DJD)  "Sister      Diabetes Sister      Factor V Leiden deficiency Sister      Other (CKD) Brother      Heart disease Other      Hypertension Other      Cancer Other      Stroke Other         Past Medical History  Past Medical History:   Diagnosis Date    At risk for bleeding 09/07/2023    Atherosclerotic heart disease of native coronary artery without angina pectoris 09/07/2023    Chronic atrial fibrillation (Multi) 09/07/2023    Chronic heart failure with preserved ejection fraction (HFpEF) (Multi) 09/07/2023    Controlled diabetes mellitus type II without complication (Multi) 09/07/2023    Edema 09/07/2023    Essential hypertension 09/07/2023    History of ischemic stroke 09/07/2023    Mixed hyperlipidemia 09/07/2023    Personal history of other diseases of the nervous system and sense organs     History of cataract    Personal history of other diseases of the nervous system and sense organs     History of glaucoma    Syncope and collapse 09/07/2023        Surgical history:   Past Surgical History:   Procedure Laterality Date    MR HEAD ANGIO WO IV CONTRAST  6/14/2016    MR HEAD ANGIO WO IV CONTRAST [facility]    OTHER SURGICAL HISTORY  01/22/2019    No history of surgery      reports that she has never smoked. She has never been exposed to tobacco smoke. She has never used smokeless tobacco.    Review Of Systems:  As per in HPI, otherwise all other 12 point of ROS are negative.    Physical Exam:  Temp 36.5 °C (97.7 °F) (Temporal)   Resp 20   Ht (S) 1.598 m (5' 2.91\")   Wt 100 kg (221 lb 7.2 oz)   LMP  (LMP Unknown)   BMI 39.34 kg/m²   BSA: 2.11 meters squared  General: awake/alert/oriented x3, no distress, alert and cooperative  Head: Short hair fully covering scalp. Symmetric facial expressions  Eyes: PERRL, EOMI, clear sclera, eyebrows present.  Ears/Nose/Mouth/Throat:  Oral mucous membranes moist. No oral ulcers. No palpable pre/post-auricular lymph nodes  Neck: No palpable cervical chain lymph " nodes  Respiratory: unlabored breathing on room air, good chest expansion, thorax symmetric  Cardio: Regular rate and rhythm, normal S1 and S2, radial pulses symmetric  GI: Nondistended, soft, non-tender abdomen  Musculoskeletal: Normal muscle bulk and tone, ROM intact, no joint swelling.  Rises from chair and walks unassisted.  Extremities: No ankle swelling, no arm or leg wounds  Neuro: Alert, cognition intact, speech normal. Facial expressions symmetric.  No motor deficits noted. Sensation intact to touch and hot/cold.   Able to stand from seated position unassisted and walks around the room unassisted.  Psychological: Appropriate mood and behavior.  Skin: Warm and dry, no lesions, no rashes    Results:  Diagnostic Results   Lab Results   Component Value Date    WBC 8.2 04/30/2024    HGB 12.6 04/30/2024    HCT 40.3 04/30/2024     (H) 04/30/2024     04/30/2024     Lab Results   Component Value Date    CALCIUM 9.3 04/30/2024     04/30/2024    K 4.0 04/30/2024    CO2 30 04/30/2024     04/30/2024    BUN 36 (H) 04/30/2024    CREATININE 1.90 (H) 04/30/2024    ALT 17 04/30/2024    AST 17 04/30/2024     Anticoagulation - Warfarin Visit on 05/02/2024   Component Date Value Ref Range Status    POC INR 05/02/2024 2.00   Final          Current Outpatient Medications:     acetaminophen (Tylenol) 325 mg tablet, Take 1 tablet (325 mg) by mouth., Disp: , Rfl:     atorvastatin (Lipitor) 20 mg tablet, TAKE 1 TABLET BY MOUTH EVERY DAY, Disp: 90 tablet, Rfl: 3    blood sugar diagnostic (Accu-Chek SmartView Test Strip) strip, USE 1 STRIP INTO VITRO, 3 TIMES A DAY-- E11.65, Disp: 300 strip, Rfl: 1    calcitriol (Rocaltrol) 0.25 mcg capsule, Take 1 capsule (0.25 mcg) by mouth once daily., Disp: , Rfl:     calcium carbonate-vitamin D3 500 mg-5 mcg (200 unit) tablet, Os-Joselo 500 + D 500-200 MG-UNIT TABS  Refills: 0     Active, Disp: , Rfl:     carvedilol (Coreg) 3.125 mg tablet, Take 1 tablet (3.125 mg) by mouth  "2 times a day., Disp: 180 tablet, Rfl: 3    dulaglutide (Trulicity) 1.5 mg/0.5 mL pen injector injection, Inject 1.5 mg under the skin 1 (one) time per week. As directed, Disp: 2 mL, Rfl: 1    ferrous gluconate (Ferate) 240 (27 Fe) MG tablet, Take 1 tablet (27 mg) by mouth every other day., Disp: 45 tablet, Rfl: 1    hydrALAZINE (Apresoline) 50 mg tablet, Take 1 tablet (50 mg) by mouth 2 times a day., Disp: 180 tablet, Rfl: 3    insulin glargine (Basaglar KwikPen U-100 Insulin) 100 unit/mL (3 mL) pen, Inject 30 Units under the skin once daily in the morning. As directed, Disp: 27 mL, Rfl: 3    insulin lispro (HumaLOG KwikPen Insulin) 100 unit/mL injection, Up to 30 units daily As directed, Disp: 45 mL, Rfl: 3    isosorbide mononitrate ER (Imdur) 60 mg 24 hr tablet, Take 1 tablet (60 mg) by mouth once daily., Disp: , Rfl:     latanoprost (Xalatan) 0.005 % ophthalmic solution, Administer 1 drop into affected eye(s) once daily at bedtime., Disp: , Rfl:     lubricating eye drops (polyvinyl alcohol-povidon,PF,) ophthalmic solution, Administer into affected eye(s)., Disp: , Rfl:     omeprazole (PriLOSEC) 20 mg DR capsule, Take 1 capsule (20 mg) by mouth once daily in the morning. Take before meals. 30 minutes before, Disp: , Rfl:     pen needle, diabetic 32 gauge x 5/32\" needle, 1 Needle 4 times a day., Disp: 400 each, Rfl: 1    potassium chloride CR 10 mEq ER tablet, Take 1 tablet (10 mEq) by mouth 2 times a day., Disp: , Rfl:     timolol (Timoptic) 0.5 % ophthalmic solution, Administer 1 drop into both eyes once daily in the morning. *NEED APPT FOR FUTURE FILLS*, Disp: , Rfl:     torsemide (Demadex) 100 mg tablet, Take 0.5 tablets (50 mg) by mouth 2 times a day., Disp: 90 tablet, Rfl: 3    VITAMIN B COMPLEX ORAL, Take by mouth. As directed, Disp: , Rfl:     warfarin (Coumadin) 6 mg tablet, Take 1 tablet (6 mg) by mouth. 1 1/2 friday; 6 mg rest of wk, Disp: , Rfl:      Radiology:    Pathology:    Assessment/Plan:    1- " Anemia: due to combination of vit b12 deficiency and stage 4 CKD and also Hx of DAYNE in 2019.    She is asymptomatic.    Recent labs are reviewed whoch do not show any evidence of Vit b12 deficiency nor iron deficiency. Her anemia has resolved since 11/2022. She can continue to have follow ups with her PCP.    She is more than welcome to call me with any questions or concerns.    Diagnoses and all orders for this visit:  Macrocytic anemia  -     Methylmalonic Acid; Future  -     Iron and TIBC; Future  -     Reticulocytes; Future  -     Vitamin B12; Future  -     Ferritin; Future  -     Comprehensive Metabolic Panel; Future  -     Folate; Future  -     CBC and Auto Differential; Future  -     Clinic Appointment Request Follow Up; [facility]       Performance Status: Asymptomatic    I spent more than 30 minutes for the patient today, including face-to-face conversation, pre-visit preparation, post-visit orders, and others.   Alivia Deluna MD                  Never smoker

## 2024-05-04 LAB — METHYLMALONATE SERPL-SCNC: 0.34 UMOL/L (ref 0–0.4)

## 2024-05-21 ENCOUNTER — OFFICE VISIT (OUTPATIENT)
Dept: PRIMARY CARE | Facility: CLINIC | Age: 89
End: 2024-05-21
Payer: MEDICARE

## 2024-05-21 VITALS
BODY MASS INDEX: 39.34 KG/M2 | TEMPERATURE: 98 F | HEART RATE: 76 BPM | SYSTOLIC BLOOD PRESSURE: 128 MMHG | HEIGHT: 63 IN | OXYGEN SATURATION: 97 % | DIASTOLIC BLOOD PRESSURE: 80 MMHG

## 2024-05-21 DIAGNOSIS — Z23 ENCOUNTER FOR IMMUNIZATION: ICD-10-CM

## 2024-05-21 DIAGNOSIS — Z71.89 COUNSELING REGARDING ADVANCED CARE PLANNING AND GOALS OF CARE: ICD-10-CM

## 2024-05-21 DIAGNOSIS — M81.0 AGE-RELATED OSTEOPOROSIS WITHOUT CURRENT PATHOLOGICAL FRACTURE: ICD-10-CM

## 2024-05-21 DIAGNOSIS — Z79.4 TYPE 2 DIABETES MELLITUS WITH OTHER SPECIFIED COMPLICATION, WITH LONG-TERM CURRENT USE OF INSULIN (MULTI): ICD-10-CM

## 2024-05-21 DIAGNOSIS — Z00.00 ANNUAL PHYSICAL EXAM: Primary | ICD-10-CM

## 2024-05-21 DIAGNOSIS — E27.8 ADRENAL MASS (MULTI): ICD-10-CM

## 2024-05-21 DIAGNOSIS — I48.20 CHRONIC ATRIAL FIBRILLATION (MULTI): ICD-10-CM

## 2024-05-21 DIAGNOSIS — E78.2 MIXED HYPERLIPIDEMIA: ICD-10-CM

## 2024-05-21 DIAGNOSIS — K21.9 GASTROESOPHAGEAL REFLUX DISEASE WITHOUT ESOPHAGITIS: ICD-10-CM

## 2024-05-21 DIAGNOSIS — N18.4 STAGE 4 CHRONIC KIDNEY DISEASE (MULTI): ICD-10-CM

## 2024-05-21 DIAGNOSIS — I50.32 CHRONIC DIASTOLIC HEART FAILURE (MULTI): ICD-10-CM

## 2024-05-21 DIAGNOSIS — E66.01 CLASS 2 SEVERE OBESITY WITH SERIOUS COMORBIDITY AND BODY MASS INDEX (BMI) OF 39.0 TO 39.9 IN ADULT, UNSPECIFIED OBESITY TYPE (MULTI): ICD-10-CM

## 2024-05-21 DIAGNOSIS — D64.9 ANEMIA, UNSPECIFIED TYPE: ICD-10-CM

## 2024-05-21 DIAGNOSIS — E11.69 TYPE 2 DIABETES MELLITUS WITH OTHER SPECIFIED COMPLICATION, WITH LONG-TERM CURRENT USE OF INSULIN (MULTI): ICD-10-CM

## 2024-05-21 DIAGNOSIS — I67.9 CEREBROVASCULAR DISEASE: ICD-10-CM

## 2024-05-21 DIAGNOSIS — E55.9 VITAMIN D DEFICIENCY: ICD-10-CM

## 2024-05-21 DIAGNOSIS — Z01.89 ENCOUNTER FOR ROUTINE LABORATORY TESTING: ICD-10-CM

## 2024-05-21 DIAGNOSIS — I25.10 CORONARY ARTERY DISEASE INVOLVING NATIVE CORONARY ARTERY OF NATIVE HEART WITHOUT ANGINA PECTORIS: ICD-10-CM

## 2024-05-21 DIAGNOSIS — E03.8 SUBCLINICAL HYPOTHYROIDISM: ICD-10-CM

## 2024-05-21 DIAGNOSIS — I10 PRIMARY HYPERTENSION: ICD-10-CM

## 2024-05-21 PROBLEM — I50.9 HEART FAILURE (MULTI): Status: ACTIVE | Noted: 2023-09-07

## 2024-05-21 PROBLEM — E11.9 TYPE 2 DIABETES MELLITUS (MULTI): Status: ACTIVE | Noted: 2023-09-07

## 2024-05-21 PROBLEM — R60.9 EDEMA: Status: RESOLVED | Noted: 2023-09-07 | Resolved: 2024-05-21

## 2024-05-21 PROBLEM — E78.5 HLD (HYPERLIPIDEMIA): Status: ACTIVE | Noted: 2023-09-07

## 2024-05-21 PROBLEM — E66.9 OBESITY: Status: ACTIVE | Noted: 2023-09-07

## 2024-05-21 PROBLEM — R55 SYNCOPE AND COLLAPSE: Status: RESOLVED | Noted: 2023-09-07 | Resolved: 2024-05-21

## 2024-05-21 PROCEDURE — 1126F AMNT PAIN NOTED NONE PRSNT: CPT | Performed by: STUDENT IN AN ORGANIZED HEALTH CARE EDUCATION/TRAINING PROGRAM

## 2024-05-21 PROCEDURE — 99215 OFFICE O/P EST HI 40 MIN: CPT | Performed by: STUDENT IN AN ORGANIZED HEALTH CARE EDUCATION/TRAINING PROGRAM

## 2024-05-21 PROCEDURE — 3079F DIAST BP 80-89 MM HG: CPT | Performed by: STUDENT IN AN ORGANIZED HEALTH CARE EDUCATION/TRAINING PROGRAM

## 2024-05-21 PROCEDURE — 1123F ACP DISCUSS/DSCN MKR DOCD: CPT | Performed by: STUDENT IN AN ORGANIZED HEALTH CARE EDUCATION/TRAINING PROGRAM

## 2024-05-21 PROCEDURE — 1170F FXNL STATUS ASSESSED: CPT | Performed by: STUDENT IN AN ORGANIZED HEALTH CARE EDUCATION/TRAINING PROGRAM

## 2024-05-21 PROCEDURE — G0439 PPPS, SUBSEQ VISIT: HCPCS | Performed by: STUDENT IN AN ORGANIZED HEALTH CARE EDUCATION/TRAINING PROGRAM

## 2024-05-21 PROCEDURE — 3074F SYST BP LT 130 MM HG: CPT | Performed by: STUDENT IN AN ORGANIZED HEALTH CARE EDUCATION/TRAINING PROGRAM

## 2024-05-21 PROCEDURE — 99214 OFFICE O/P EST MOD 30 MIN: CPT | Performed by: STUDENT IN AN ORGANIZED HEALTH CARE EDUCATION/TRAINING PROGRAM

## 2024-05-21 PROCEDURE — 1036F TOBACCO NON-USER: CPT | Performed by: STUDENT IN AN ORGANIZED HEALTH CARE EDUCATION/TRAINING PROGRAM

## 2024-05-21 PROCEDURE — 1158F ADVNC CARE PLAN TLK DOCD: CPT | Performed by: STUDENT IN AN ORGANIZED HEALTH CARE EDUCATION/TRAINING PROGRAM

## 2024-05-21 PROCEDURE — 1160F RVW MEDS BY RX/DR IN RCRD: CPT | Performed by: STUDENT IN AN ORGANIZED HEALTH CARE EDUCATION/TRAINING PROGRAM

## 2024-05-21 PROCEDURE — 1159F MED LIST DOCD IN RCRD: CPT | Performed by: STUDENT IN AN ORGANIZED HEALTH CARE EDUCATION/TRAINING PROGRAM

## 2024-05-21 PROCEDURE — 1157F ADVNC CARE PLAN IN RCRD: CPT | Performed by: STUDENT IN AN ORGANIZED HEALTH CARE EDUCATION/TRAINING PROGRAM

## 2024-05-21 ASSESSMENT — ENCOUNTER SYMPTOMS
EYES NEGATIVE: 1
CARDIOVASCULAR NEGATIVE: 1
ALLERGIC/IMMUNOLOGIC NEGATIVE: 1
RESPIRATORY NEGATIVE: 1
GASTROINTESTINAL NEGATIVE: 1
HEMATOLOGIC/LYMPHATIC NEGATIVE: 1
MUSCULOSKELETAL NEGATIVE: 1
NEUROLOGICAL NEGATIVE: 1
ENDOCRINE NEGATIVE: 1
PSYCHIATRIC NEGATIVE: 1
CONSTITUTIONAL NEGATIVE: 1

## 2024-05-21 ASSESSMENT — ACTIVITIES OF DAILY LIVING (ADL)
DOING_HOUSEWORK: INDEPENDENT
GROCERY_SHOPPING: INDEPENDENT
TAKING_MEDICATION: INDEPENDENT
DRESSING: INDEPENDENT
MANAGING_FINANCES: INDEPENDENT
BATHING: INDEPENDENT

## 2024-05-21 ASSESSMENT — PAIN SCALES - GENERAL: PAINLEVEL: 0-NO PAIN

## 2024-05-21 NOTE — PROGRESS NOTES
Faustino Longo (:  2007) is a 16 y.o. male    ASSESSMENT/PLAN:    Right index finger laceration. Seven continuous sutures removed intact w/o difficulty. Healing well w/ reassuring exam. No concern for wound infection or dehiscence.    Routine wound care, topical therapy as indicated. Discussed care of scars, importance of sunscreen. Immediate follow up w/ fever, skin redness/tenderness/drainage, new concerns.    SUBJECTIVE/OBJECTIVE:  HPI    CC: Suture removal    Finger laceration 10d ago. Evaluated in ED w/ otherwise reassuring exam. Seven running sutures/staples placed. No new fever, skin redness/tenderness/drainage.    No other injuries.    Concerns since repair: none      There were no vitals taken for this visit.    Physical Exam  Vitals and nursing note reviewed.   Constitutional:       General: He is not in acute distress.     Appearance: He is not ill-appearing or toxic-appearing.   HENT:      Right Ear: Tympanic membrane normal.      Left Ear: Tympanic membrane normal.      Nose: No congestion or rhinorrhea.      Mouth/Throat:      Mouth: Mucous membranes are moist.      Pharynx: No oropharyngeal exudate or posterior oropharyngeal erythema.   Eyes:      General:         Right eye: No discharge.         Left eye: No discharge.      Extraocular Movements: Extraocular movements intact.      Conjunctiva/sclera: Conjunctivae normal.      Pupils: Pupils are equal, round, and reactive to light.   Cardiovascular:      Rate and Rhythm: Normal rate and regular rhythm.      Pulses: Normal pulses.      Heart sounds: Normal heart sounds. No murmur heard.  Pulmonary:      Effort: Pulmonary effort is normal. No respiratory distress.      Breath sounds: Normal breath sounds. No wheezing, rhonchi or rales.   Abdominal:      General: Bowel sounds are normal. There is no distension.      Palpations: Abdomen is soft. There is no mass.      Tenderness: There is no abdominal tenderness. There is no guarding.  [facility]: [facility]  MEDICARE WELLNESS EXAM      Shoshana Thomas is a 88 y.o. female that is presenting today for Annual Exam.    Assessment/Plan    Diagnoses and all orders for this visit:  Annual physical exam  -     Follow Up In Primary Care; Future  Counseling regarding advanced care planning and goals of care  Encounter for routine laboratory testing  -     CBC and Auto Differential; Future  -     Basic Metabolic Panel; Future  -     Hepatic Function Panel; Future  -     Lipid Panel; Future  -     Hemoglobin A1C; Future  -     Vitamin D 25-Hydroxy,Total (for eval of Vitamin D levels); Future  -     TSH with reflex to Free T4 if abnormal; Future  -     Albumin , Urine Random; Future  -     Vitamin B12; Future  -     Iron and TIBC; Future  -     Ferritin; Future  -     Folate; Future  Encounter for immunization  Class 2 severe obesity with serious comorbidity and body mass index (BMI) of 39.0 to 39.9 in adult, unspecified obesity type (Multi)  Type 2 diabetes mellitus with other specified complication, with long-term current use of insulin (Multi)  -     Hemoglobin A1C; Future  -     Albumin , Urine Random; Future  Coronary artery disease involving native coronary artery of native heart without angina pectoris  Age-related osteoporosis without current pathological fracture  -     Vitamin D 25-Hydroxy,Total (for eval of Vitamin D levels); Future  Gastroesophageal reflux disease without esophagitis  Stage 4 chronic kidney disease (Multi)  -     Basic Metabolic Panel; Future  -     Albumin , Urine Random; Future  Chronic diastolic heart failure (Multi)  Chronic atrial fibrillation (Multi)  Subclinical hypothyroidism  -     TSH with reflex to Free T4 if abnormal; Future  Anemia, unspecified type  -     CBC and Auto Differential; Future  -     Vitamin B12; Future  -     Iron and TIBC; Future  -     Ferritin; Future  -     Folate; Future  Cerebrovascular disease  Mixed hyperlipidemia  -      "Hepatic Function Panel; Future  -     Lipid Panel; Future  Primary hypertension  -     Basic Metabolic Panel; Future  Adrenal mass (Multi)  Vitamin D deficiency  -     Vitamin D 25-Hydroxy,Total (for eval of Vitamin D levels); Future    Current Outpatient Medications   Medication Instructions    acetaminophen (TYLENOL) 325 mg, oral    atorvastatin (LIPITOR) 20 mg, oral, Daily    blood sugar diagnostic (Accu-Chek SmartView Test Strip) strip USE 1 STRIP INTO VITRO, 3 TIMES A DAY-- E11.65    calcitriol (ROCALTROL) 0.25 mcg, oral, Daily    calcium carbonate-vitamin D3 500 mg-5 mcg (200 unit) tablet Os-Joselo 500 + D 500-200 MG-UNIT TABS   Refills: 0       Active    carvedilol (COREG) 3.125 mg, oral, 2 times daily    ferrous gluconate (FERATE) 27 mg, oral, Every other day    hydrALAZINE (APRESOLINE) 50 mg, oral, 2 times daily    insulin glargine (BASAGLAR KWIKPEN U-100 INSULIN) 30 Units, subcutaneous, Every morning, As directed    insulin lispro (HumaLOG KwikPen Insulin) 100 unit/mL injection Up to 30 units daily As directed    isosorbide mononitrate ER (IMDUR) 60 mg, oral, Daily    latanoprost (Xalatan) 0.005 % ophthalmic solution 1 drop, ophthalmic (eye), Nightly    lubricating eye drops (polyvinyl alcohol-povidon,PF,) ophthalmic solution ophthalmic (eye)    omeprazole (PRILOSEC) 20 mg, oral, Daily before breakfast, 30 minutes before    pen needle, diabetic 32 gauge x 5/32\" needle 1 Needle, miscellaneous, 4 times daily    potassium chloride CR 10 mEq ER tablet 10 mEq, oral, 2 times daily    timolol (Timoptic) 0.5 % ophthalmic solution 1 drop, Both Eyes, Every morning, *NEED APPT FOR FUTURE FILLS*<BR>    torsemide (DEMADEX) 50 mg, oral, 2 times daily    Trulicity 1.5 mg, subcutaneous, Once Weekly, As directed    VITAMIN B COMPLEX ORAL oral, As directed    warfarin (COUMADIN) 6 mg, oral, 1 1/2 friday; 6 mg rest of wk     Discussed routine and/or preventative care with the patient as outlined below:  - Labwork:   - Patient " had labwork done for this appointment. Discussed today: Everything looked stable.   - Will order labwork for the patient's next appointment.  - Imaging:   - Patient does not appear to be due for routine imaging at this time.  - Immunizations:   - Encouraged the patient to get their shingles (shingrix) immunizations.  - Encouraged the tetanus (TDAP) booster.   - Discussed the RSV immunization.  - Discussed seasonal immunizations, including the influenza and COVID-19 immunizations.   - Significant medication and problem list reconciliation done today. Discussed any opiate and/or controlled substance use with the patient.  - Vitals look great. Do not need to make changes at this time.  - Encouraged continued diet and exercise modification.    ADVANCED CARE PLANNING  Advanced Care Planning was discussed with patient.  Encouraged the patient to confirm that Living Will and Healthcare Power of  (HCPoA) are accurate and up to date.  Encouraged the patient to confirm that our office be provided a copy of any documentation in the event that anything changes.    Subjective   - The patient otherwise feels well and denies any acute symptoms or concerns at this time.  - The patient denies any changes or progression of their chronic medical problems.  - The patient denies any problems or concerns with their medications.      Review of Systems   Constitutional: Negative.    HENT: Negative.     Eyes: Negative.    Respiratory: Negative.     Cardiovascular: Negative.    Gastrointestinal: Negative.    Endocrine: Negative.    Genitourinary: Negative.    Musculoskeletal: Negative.    Skin: Negative.    Allergic/Immunologic: Negative.    Neurological: Negative.    Hematological: Negative.    Psychiatric/Behavioral: Negative.     All other systems reviewed and are negative.    Objective   Vitals:    05/21/24 1528   BP: 128/80   Pulse: 76   Temp: 36.7 °C (98 °F)   SpO2: 97%      Body mass index is 39.34 kg/m².  Physical  Exam  Vitals and nursing note reviewed.   Constitutional:       General: She is not in acute distress.     Appearance: Normal appearance. She is not ill-appearing.   HENT:      Head: Normocephalic and atraumatic.      Right Ear: Tympanic membrane, ear canal and external ear normal. There is no impacted cerumen.      Left Ear: Tympanic membrane, ear canal and external ear normal. There is no impacted cerumen.      Nose: Nose normal.      Mouth/Throat:      Mouth: Mucous membranes are moist.      Pharynx: Oropharynx is clear. No oropharyngeal exudate or posterior oropharyngeal erythema.   Eyes:      General: No scleral icterus.        Right eye: No discharge.         Left eye: No discharge.      Extraocular Movements: Extraocular movements intact.      Conjunctiva/sclera: Conjunctivae normal.      Pupils: Pupils are equal, round, and reactive to light.   Neck:      Vascular: No carotid bruit.   Cardiovascular:      Rate and Rhythm: Normal rate and regular rhythm.      Pulses: Normal pulses.      Heart sounds: Normal heart sounds. No murmur heard.     No friction rub. No gallop.   Pulmonary:      Effort: Pulmonary effort is normal. No respiratory distress.      Breath sounds: Normal breath sounds.   Abdominal:      General: Abdomen is flat. Bowel sounds are normal. There is no distension.      Palpations: Abdomen is soft.      Tenderness: There is no abdominal tenderness.      Hernia: No hernia is present.   Musculoskeletal:         General: No swelling or tenderness. Normal range of motion.   Lymphadenopathy:      Cervical: No cervical adenopathy.   Skin:     General: Skin is warm and dry.      Capillary Refill: Capillary refill takes less than 2 seconds.      Coloration: Skin is not jaundiced.      Findings: No rash.   Neurological:      General: No focal deficit present.      Mental Status: She is alert and oriented to person, place, and time. Mental status is at baseline.   Psychiatric:         Mood and Affect:  "Mood normal.         Behavior: Behavior normal.       Diagnostic Results   Lab Results   Component Value Date    GLUCOSE 176 (H) 04/30/2024    CALCIUM 9.3 04/30/2024     04/30/2024    K 4.0 04/30/2024    CO2 30 04/30/2024     04/30/2024    BUN 36 (H) 04/30/2024    CREATININE 1.90 (H) 04/30/2024     Lab Results   Component Value Date    ALT 17 04/30/2024    AST 17 04/30/2024    ALKPHOS 87 04/30/2024    BILITOT 0.5 04/30/2024     Lab Results   Component Value Date    WBC 8.2 04/30/2024    HGB 12.6 04/30/2024    HCT 40.3 04/30/2024     (H) 04/30/2024     04/30/2024     Lab Results   Component Value Date    CHOL 133 11/04/2022    CHOL 133 11/04/2022    CHOL 153 02/16/2022     Lab Results   Component Value Date    HDL 42 (L) 11/04/2022    HDL 42 (L) 11/04/2022    HDL 43 (L) 02/16/2022     Lab Results   Component Value Date    LDLCALC 75 11/04/2022    LDLCALC 75 11/04/2022    LDLCALC 91 02/16/2022     Lab Results   Component Value Date    TRIG 82 11/04/2022    TRIG 82 11/04/2022    TRIG 97 02/16/2022     No components found for: \"CHOLHDL\"  Lab Results   Component Value Date    HGBA1C 6.4 02/28/2024     Other labs not included in the list above reviewed either before or during this encounter.    History   Past Medical History:   Diagnosis Date    At risk for bleeding 09/07/2023    Atherosclerotic heart disease of native coronary artery without angina pectoris 09/07/2023    Chronic atrial fibrillation (Multi) 09/07/2023    Chronic heart failure with preserved ejection fraction (HFpEF) (Multi) 09/07/2023    Controlled diabetes mellitus type II without complication (Multi) 09/07/2023    Edema 09/07/2023    Essential hypertension 09/07/2023    History of ischemic stroke 09/07/2023    Mixed hyperlipidemia 09/07/2023    Personal history of other diseases of the nervous system and sense organs     History of cataract    Personal history of other diseases of the nervous system and sense organs     History " of glaucoma    Syncope and collapse 09/07/2023     Past Surgical History:   Procedure Laterality Date    MR HEAD ANGIO WO IV CONTRAST  6/14/2016    MR HEAD ANGIO WO IV CONTRAST [facility]    OTHER SURGICAL HISTORY  01/22/2019    No history of surgery     Family History   Problem Relation Name Age of Onset    Other (VAD) Mother      Diabetes Father      Other (Sepsis) Father      Other (DJD) Sister      Diabetes Sister      Factor V Leiden deficiency Sister      Other (CKD) Brother      Heart disease Other      Hypertension Other      Cancer Other      Stroke Other       Social History     Socioeconomic History    Marital status:      Spouse name: Not on file    Number of children: Not on file    Years of education: Not on file    Highest education level: Not on file   Occupational History    Not on file   Tobacco Use    Smoking status: Never     Passive exposure: Never    Smokeless tobacco: Never   Vaping Use    Vaping status: Never Used   Substance and Sexual Activity    Alcohol use: Yes     Comment: very rarley\ Occasional    Drug use: Never    Sexual activity: Defer   Other Topics Concern    Not on file   Social History Narrative    Not on file     Social Determinants of Health     Financial Resource Strain: Not on file   Food Insecurity: Not on file   Transportation Needs: Not on file   Physical Activity: Not on file   Stress: Not on file   Social Connections: Not on file   Intimate Partner Violence: Not on file   Housing Stability: Not on file     No Known Allergies  Current Outpatient Medications on File Prior to Visit   Medication Sig Dispense Refill    acetaminophen (Tylenol) 325 mg tablet Take 1 tablet (325 mg) by mouth.      atorvastatin (Lipitor) 20 mg tablet TAKE 1 TABLET BY MOUTH EVERY DAY 90 tablet 3    blood sugar diagnostic (Accu-Chek SmartView Test Strip) strip USE 1 STRIP INTO VITRO, 3 TIMES A DAY-- E11.65 300 strip 1    calcitriol (Rocaltrol) 0.25 mcg capsule Take 1 capsule (0.25  "mcg) by mouth once daily.      calcium carbonate-vitamin D3 500 mg-5 mcg (200 unit) tablet Os-Joselo 500 + D 500-200 MG-UNIT TABS   Refills: 0       Active      carvedilol (Coreg) 3.125 mg tablet Take 1 tablet (3.125 mg) by mouth 2 times a day. 180 tablet 3    dulaglutide (Trulicity) 1.5 mg/0.5 mL pen injector injection Inject 1.5 mg under the skin 1 (one) time per week. As directed 2 mL 1    ferrous gluconate (Ferate) 240 (27 Fe) MG tablet Take 1 tablet (27 mg) by mouth every other day. 45 tablet 1    hydrALAZINE (Apresoline) 50 mg tablet Take 1 tablet (50 mg) by mouth 2 times a day. 180 tablet 3    insulin glargine (Basaglar KwikPen U-100 Insulin) 100 unit/mL (3 mL) pen Inject 30 Units under the skin once daily in the morning. As directed 27 mL 3    insulin lispro (HumaLOG KwikPen Insulin) 100 unit/mL injection Up to 30 units daily As directed 45 mL 3    isosorbide mononitrate ER (Imdur) 60 mg 24 hr tablet Take 1 tablet (60 mg) by mouth once daily.      latanoprost (Xalatan) 0.005 % ophthalmic solution Administer 1 drop into affected eye(s) once daily at bedtime.      lubricating eye drops (polyvinyl alcohol-povidon,PF,) ophthalmic solution Administer into affected eye(s).      omeprazole (PriLOSEC) 20 mg DR capsule Take 1 capsule (20 mg) by mouth once daily in the morning. Take before meals. 30 minutes before      pen needle, diabetic 32 gauge x 5/32\" needle 1 Needle 4 times a day. 400 each 1    potassium chloride CR 10 mEq ER tablet Take 1 tablet (10 mEq) by mouth 2 times a day.      timolol (Timoptic) 0.5 % ophthalmic solution Administer 1 drop into both eyes once daily in the morning. *NEED APPT FOR FUTURE FILLS*      torsemide (Demadex) 100 mg tablet Take 0.5 tablets (50 mg) by mouth 2 times a day. 90 tablet 3    VITAMIN B COMPLEX ORAL Take by mouth. As directed      warfarin (Coumadin) 6 mg tablet Take 1 tablet (6 mg) by mouth. 1 1/2 friday; 6 mg rest of wk       No current facility-administered medications on " file prior to visit.     Immunization History   Administered Date(s) Administered    Flu vaccine, quadrivalent, high-dose, preservative free, age 65y+ (FLUZONE) 11/08/2021, 11/11/2022, 10/20/2023    Influenza Whole 11/07/2007    Influenza, High Dose Seasonal, Preservative Free 09/12/2013, 09/24/2014, 10/08/2015, 10/07/2016, 10/13/2017, 11/09/2018, 10/07/2019    Influenza, seasonal, injectable 10/04/2010, 10/26/2011, 10/01/2012    Moderna SARS-CoV-2 Vaccination 02/05/2021, 03/06/2021, 01/13/2022    Novel influenza-H1N1-09, preservative-free 01/12/2010    Pneumococcal conjugate vaccine, 13-valent (PREVNAR 13) 11/20/2015    Pneumococcal polysaccharide vaccine, 23-valent, age 2 years and older (PNEUMOVAX 23) 11/29/2005, 12/23/2019     Patient's medical history was reviewed and updated either before or during this encounter.     Gene Fraser MD

## 2024-05-21 NOTE — PATIENT INSTRUCTIONS
Discussed routine and/or preventative care with the patient as outlined below:  - Labwork:   - Patient had labwork done for this appointment. Discussed today: Everything looked stable.   - Will order labwork for the patient's next appointment.  - Imaging:   - Patient does not appear to be due for routine imaging at this time.  - Immunizations:   - Encouraged the patient to get their shingles (shingrix) immunizations.  - Encouraged the tetanus (TDAP) booster.   - Discussed the RSV immunization.  - Discussed seasonal immunizations, including the influenza and COVID-19 immunizations.   - Significant medication and problem list reconciliation done today. Discussed any opiate and/or controlled substance use with the patient.  - Vitals look great. Do not need to make changes at this time.  - Encouraged continued diet and exercise modification.    ADVANCED CARE PLANNING  Advanced Care Planning was discussed with patient.  Encouraged the patient to confirm that Living Will and Healthcare Power of  (HCPoA) are accurate and up to date.  Encouraged the patient to confirm that our office be provided a copy of any documentation in the event that anything changes.

## 2024-05-31 ENCOUNTER — ANTICOAGULATION - WARFARIN VISIT (OUTPATIENT)
Dept: CARDIOLOGY | Facility: CLINIC | Age: 89
End: 2024-05-31
Payer: MEDICARE

## 2024-05-31 DIAGNOSIS — I48.20 CHRONIC ATRIAL FIBRILLATION (MULTI): ICD-10-CM

## 2024-05-31 LAB
POC INR: 2.4
POC PROTHROMBIN TIME: NORMAL

## 2024-05-31 PROCEDURE — 85610 PROTHROMBIN TIME: CPT | Mod: QW

## 2024-05-31 PROCEDURE — 99211 OFF/OP EST MAY X REQ PHY/QHP: CPT | Performed by: INTERNAL MEDICINE

## 2024-05-31 NOTE — PROGRESS NOTES
Patient identification verified with 2 identifiers.    Location: [facility] - [address] [place] [address] [phone]     Referring Physician:  DR TAPIA  Enrollment/ Re-enrollment date: 24   INR Goal: 2.0-3.0  INR monitoring is per [facility] protocol.  Anticoagulation Medication: warfarin  Indication: atrial fibrillation    Subjective   Bleeding signs/symptoms: No    Bruising: No   Major bleeding event: No  Thrombosis signs/symptoms: No  Thromboembolic event: No  Missed doses: No  Extra doses: No  Medication changes: No  Dietary changes: No  Change in health: No  Change in activity: No  Alcohol: No  Other concerns: No    Upcoming Surgeries:  Does the Patient Have any upcoming surgeries that require interruption in anticoagulation therapy? no  Does the patient require bridging? no      Anticoagulation Summary  As of 2024      INR goal:  2.0-3.0   TTR:  92.7% (7.8 mo)   INR used for dosin.40 (2024)   Weekly warfarin total:  45 mg               Assessment/Plan   Therapeutic     1. New dose: no change  2. Next INR: 4 week      Education provided to patient during the visit:  Patient instructed to call in interim with questions, concerns and changes.

## 2024-06-17 NOTE — PROGRESS NOTES
HPI   87yo previously of Dr. Fraire practice with dm2, htn ckd, cva, cad, dyslipidemia presents in f/u. A1c was 6.9%, today 7.2%. Pt testing blood sugars 4 times per day with libre3 - Personal start visit for augusto 3 with pharmD 3/14/2024.   Pt following a carb controlled diet and knows reasonable carb allowances. Lives with grandson, Pantera, and he accompanied her to this visit today.     -taking basaglar 20 units am, humalog 4 units before meals - has not been taking humalog/basaglar past week due to lower blood sugars, missing many doses since starting cgm, did take 20 units basaglar this morning    -taking trulicity 1.5mg, tolerating     - [employer] application completed and approved 6/2024 thru end of 2024           Taking atorvastatin 20mg for lipids and tolerating.         Taking torsemide, carvedilol, hydralazine for htn, following with nephrology Dr. Portillo.      Current Outpatient Medications:     acetaminophen (Tylenol) 325 mg tablet, Take 1 tablet (325 mg) by mouth., Disp: , Rfl:     atorvastatin (Lipitor) 20 mg tablet, TAKE 1 TABLET BY MOUTH EVERY DAY, Disp: 90 tablet, Rfl: 3    blood sugar diagnostic (Accu-Chek SmartView Test Strip) strip, USE 1 STRIP INTO VITRO, 3 TIMES A DAY-- E11.65, Disp: 300 strip, Rfl: 1    calcitriol (Rocaltrol) 0.25 mcg capsule, Take 1 capsule (0.25 mcg) by mouth once daily., Disp: , Rfl:     calcium carbonate-vitamin D3 500 mg-5 mcg (200 unit) tablet, Os-Joselo 500 + D 500-200 MG-UNIT TABS  Refills: 0     Active, Disp: , Rfl:     carvedilol (Coreg) 3.125 mg tablet, Take 1 tablet (3.125 mg) by mouth 2 times a day., Disp: 180 tablet, Rfl: 3    dulaglutide (Trulicity) 1.5 mg/0.5 mL pen injector injection, Inject 1.5 mg under the skin 1 (one) time per week. As directed, Disp: 2 mL, Rfl: 1    ferrous gluconate (Ferate) 240 (27 Fe) MG tablet, Take 1 tablet (27 mg) by mouth every other day., Disp: 45 tablet, Rfl: 1    hydrALAZINE (Apresoline) 50 mg tablet, Take 1 tablet (50 mg) by  "mouth 2 times a day., Disp: 180 tablet, Rfl: 3    insulin glargine (Basaglar KwikPen U-100 Insulin) 100 unit/mL (3 mL) pen, Inject 30 Units under the skin once daily in the morning. As directed, Disp: 27 mL, Rfl: 3    insulin lispro (HumaLOG KwikPen Insulin) 100 unit/mL injection, Up to 30 units daily As directed, Disp: 45 mL, Rfl: 3    isosorbide mononitrate ER (Imdur) 60 mg 24 hr tablet, Take 1 tablet (60 mg) by mouth once daily., Disp: , Rfl:     latanoprost (Xalatan) 0.005 % ophthalmic solution, Administer 1 drop into affected eye(s) once daily at bedtime., Disp: , Rfl:     lubricating eye drops (polyvinyl alcohol-povidon,PF,) ophthalmic solution, Administer into affected eye(s)., Disp: , Rfl:     omeprazole (PriLOSEC) 20 mg DR capsule, Take 1 capsule (20 mg) by mouth once daily in the morning. Take before meals. 30 minutes before, Disp: , Rfl:     pen needle, diabetic 32 gauge x 5/32\" needle, 1 Needle 4 times a day., Disp: 400 each, Rfl: 1    potassium chloride CR 10 mEq ER tablet, Take 1 tablet (10 mEq) by mouth 2 times a day., Disp: , Rfl:     timolol (Timoptic) 0.5 % ophthalmic solution, Administer 1 drop into both eyes once daily in the morning. *NEED APPT FOR FUTURE FILLS*, Disp: , Rfl:     torsemide (Demadex) 100 mg tablet, Take 0.5 tablets (50 mg) by mouth 2 times a day., Disp: 90 tablet, Rfl: 3    VITAMIN B COMPLEX ORAL, Take by mouth. As directed, Disp: , Rfl:     warfarin (Coumadin) 6 mg tablet, Take 1 tablet (6 mg) by mouth. 1 1/2 friday; 6 mg rest of wk, Disp: , Rfl:     Allergies as of 06/26/2024    (No Known Allergies)       LMP  (LMP Unknown)     Labs:   Lab Results   Component Value Date    WBC 8.2 04/30/2024    NRBC 0.0 04/30/2024    RBC 3.92 (L) 04/30/2024    HGB 12.6 04/30/2024    HCT 40.3 04/30/2024     04/30/2024     Lab Results   Component Value Date    CALCIUM 9.3 04/30/2024    AST 17 04/30/2024    ALKPHOS 87 04/30/2024    BILITOT 0.5 04/30/2024    PROT 7.3 04/30/2024    ALBUMIN " "3.7 04/30/2024    GLOB 4.0 (H) 11/04/2022    GLOB 4.0 (H) 11/04/2022    AGR 0.8 (L) 11/04/2022    AGR 0.8 (L) 11/04/2022     04/30/2024    K 4.0 04/30/2024     04/30/2024    CO2 30 04/30/2024    ANIONGAP 14 04/30/2024    BUN 36 (H) 04/30/2024    CREATININE 1.90 (H) 04/30/2024    UREACREAUR 14.1 09/26/2023    GLUCOSE 176 (H) 04/30/2024    ALT 17 04/30/2024    EGFR 25 (L) 04/30/2024     Lab Results   Component Value Date    CHOL 133 11/04/2022    CHOL 133 11/04/2022    TRIG 82 11/04/2022    TRIG 82 11/04/2022    HDL 42 (L) 11/04/2022    HDL 42 (L) 11/04/2022    LDLCALC 75 11/04/2022    LDLCALC 75 11/04/2022     No results found for: \"MICROALBCREA\"  Lab Results   Component Value Date    TSH 6.30 (H) 02/16/2022     Lab Results   Component Value Date    AMQPNYHB06 856 04/30/2024     Lab Results   Component Value Date    HGBA1C 6.4 02/28/2024         Assessment/Plan   1. Type 2 diabetes mellitus with hyperglycemia, with long-term current use of insulin (Multi)  -A1c ordered and reviewed, at target  -labs reviewed  -augusto 3 data reviewed with patient (scanned in epic), reviewed glycemic targets, reinforced looking for patterns and trends, continue to learn which meals/snacks cause hyperglycemia and adjust portion sizes    -reviewed symptoms, treatment and prevention of hypoglycemia, reinforced carrying glucose source at all times;  low alert found to be off, turned on and set at 85  -goal is most blood sugars between 100-200 without hypoglycemia    -stable blood sugars past week without insulin, decrease basaglar to 10 units daily, if having lows, stop  -remain off humalog  -continue trulicity 1.5 mg weekly    2. Essential hypertension  -above target today, at target last month with pcp  -seeing cardiology, dr horn in 2 weeks    3. Mixed hyperlipidemia  -on statin and tolerating      Follow up: Dr. Helms 4 months     -labs/tests/notes reviewed  -reviewed and counseled patient on medication monitoring and side " effects    Treatment and plan discussed with Dr. Helms. Tiana RUSSO Certified Diabetes Care and     Medical Decision Making  Complexity of problem: Chronic illness of diabetes mellitus uncontrolled, progressing  Data analyzed and reviewed: Reviewed prior notes, blood glucose data, labs including HgbA1c, lipids, serum chemistries.  Ordered tests.   Risk of complications and morbidities: Is definite because of use of insulin and risk of hypoglycemia.  Prescription medications reviewed and modifications made.  Compliance assessed.  Addressed social determinants of health including food insecurity.

## 2024-06-19 ENCOUNTER — TELEPHONE (OUTPATIENT)
Dept: ENDOCRINOLOGY | Facility: CLINIC | Age: 89
End: 2024-06-19
Payer: MEDICARE

## 2024-06-19 NOTE — TELEPHONE ENCOUNTER
Patient called the on-call service asking about her insulin and trulicity assistance. She also shared she was not feeling her usual and was not sure if she should take her Humalog insulin. I instructed her not to take Humalog unless she eats and if the BS was under 100 not to take.

## 2024-06-20 ENCOUNTER — TELEPHONE (OUTPATIENT)
Dept: ENDOCRINOLOGY | Facility: CLINIC | Age: 89
End: 2024-06-20
Payer: MEDICARE

## 2024-06-24 ENCOUNTER — APPOINTMENT (OUTPATIENT)
Dept: CARDIOLOGY | Facility: CLINIC | Age: 89
End: 2024-06-24
Payer: MEDICARE

## 2024-06-26 ENCOUNTER — APPOINTMENT (OUTPATIENT)
Dept: ENDOCRINOLOGY | Facility: CLINIC | Age: 89
End: 2024-06-26
Payer: MEDICARE

## 2024-06-26 VITALS
HEART RATE: 60 BPM | WEIGHT: 218.4 LBS | BODY MASS INDEX: 38.79 KG/M2 | DIASTOLIC BLOOD PRESSURE: 80 MMHG | SYSTOLIC BLOOD PRESSURE: 148 MMHG

## 2024-06-26 DIAGNOSIS — I10 ESSENTIAL HYPERTENSION: ICD-10-CM

## 2024-06-26 DIAGNOSIS — Z79.4 TYPE 2 DIABETES MELLITUS WITH HYPERGLYCEMIA, WITH LONG-TERM CURRENT USE OF INSULIN (MULTI): Primary | ICD-10-CM

## 2024-06-26 DIAGNOSIS — E11.65 TYPE 2 DIABETES MELLITUS WITH HYPERGLYCEMIA, WITH LONG-TERM CURRENT USE OF INSULIN (MULTI): Primary | ICD-10-CM

## 2024-06-26 DIAGNOSIS — E78.2 MIXED HYPERLIPIDEMIA: ICD-10-CM

## 2024-06-26 LAB — POC HEMOGLOBIN A1C: 7.2 % (ref 4.2–6.5)

## 2024-06-26 PROCEDURE — 95251 CONT GLUC MNTR ANALYSIS I&R: CPT | Performed by: INTERNAL MEDICINE

## 2024-06-26 PROCEDURE — 99214 OFFICE O/P EST MOD 30 MIN: CPT | Performed by: INTERNAL MEDICINE

## 2024-06-26 PROCEDURE — 95249 CONT GLUC MNTR PT PROV EQP: CPT | Performed by: INTERNAL MEDICINE

## 2024-06-26 PROCEDURE — 83036 HEMOGLOBIN GLYCOSYLATED A1C: CPT | Performed by: INTERNAL MEDICINE

## 2024-06-26 ASSESSMENT — PAIN SCALES - GENERAL: PAINLEVEL: 3

## 2024-06-26 NOTE — PATIENT INSTRUCTIONS
Stop humalog    Decrease Basaglar to 10 units  -if having lows, stop     Continue Trulicity 1.5 mg weekly    Keep your augusto low alert on and set at 85    Keep up the good work!

## 2024-06-26 NOTE — PROGRESS NOTES
Attestation signed by David Helms MD on 6/26/24 at 4:40 PM.    I, Dr David Helms, have reviewed this progress note, medication list, vital signs, any pertinent lab values, and any CGM data if present with the Certified Diabetes Care and  face to face during this visit today. This note reflects the treatment plan that was made under my direction after reviewing the above mentioned elements while face to face with the patient and CDE.  I personally answered and addressed any questions and concerns the patient had during the visit today.  The CDE entered the data in this note under my direction and I personally reviewed it, signed any lab or medication orders that I instructed to be completed. I am the billing provider for this visit and the level of service was determined by my involvement in the Medical Decision Making Component of this visit while face to face with the patient.

## 2024-06-28 ENCOUNTER — ANTICOAGULATION - WARFARIN VISIT (OUTPATIENT)
Dept: CARDIOLOGY | Facility: CLINIC | Age: 89
End: 2024-06-28
Payer: MEDICARE

## 2024-06-28 DIAGNOSIS — I48.20 CHRONIC ATRIAL FIBRILLATION (MULTI): ICD-10-CM

## 2024-06-28 LAB
POC INR: 2.3
POC PROTHROMBIN TIME: NORMAL

## 2024-06-28 PROCEDURE — 99211 OFF/OP EST MAY X REQ PHY/QHP: CPT | Performed by: INTERNAL MEDICINE

## 2024-06-28 PROCEDURE — 85610 PROTHROMBIN TIME: CPT | Mod: QW

## 2024-06-28 NOTE — PROGRESS NOTES
Patient identification verified with 2 identifiers.    Location: [facility] - [address] [place] [address] [phone]     Referring Physician:  DR TAPIA  Enrollment/ Re-enrollment date: 7/3/2025  INR Goal: 2.0-3.0  INR monitoring is per [facility] protocol.  Anticoagulation Medication: warfarin  Indication: atrial fibrillation    Subjective   Bleeding signs/symptoms: No    Bruising: No   Major bleeding event: No  Thrombosis signs/symptoms: No  Thromboembolic event: No  Missed doses: No  Extra doses: No  Medication changes: No  Dietary changes: No  Change in health: No  Change in activity: No  Alcohol: No  Other concerns: No    Upcoming Surgeries:  Does the Patient Have any upcoming surgeries that require interruption in anticoagulation therapy? no  Does the patient require bridging? no      Anticoagulation Summary  As of 2024      INR goal:  2.0-3.0   TTR:  93.5% (8.8 mo)   INR used for dosin.30 (2024)   Weekly warfarin total:  45 mg               Assessment/Plan   Therapeutic     1. New dose: no change  2. Next INR: 4 week      Education provided to patient during the visit:  Patient instructed to call in interim with questions, concerns and changes.

## 2024-07-03 PROBLEM — I48.20 CHRONIC ATRIAL FIBRILLATION (MULTI): Status: ACTIVE | Noted: 2024-07-03

## 2024-07-09 ENCOUNTER — APPOINTMENT (OUTPATIENT)
Dept: CARDIOLOGY | Facility: CLINIC | Age: 89
End: 2024-07-09
Payer: MEDICARE

## 2024-08-02 ENCOUNTER — APPOINTMENT (OUTPATIENT)
Dept: CARDIOLOGY | Facility: CLINIC | Age: 89
End: 2024-08-02
Payer: MEDICARE

## 2024-08-05 DIAGNOSIS — I48.20 CHRONIC ATRIAL FIBRILLATION (MULTI): Primary | ICD-10-CM

## 2024-08-05 RX ORDER — WARFARIN 6 MG/1
6 TABLET ORAL SEE ADMIN INSTRUCTIONS
Qty: 32 TABLET | Refills: 3 | Status: SHIPPED | OUTPATIENT
Start: 2024-08-05

## 2024-08-19 ENCOUNTER — ANTICOAGULATION - WARFARIN VISIT (OUTPATIENT)
Dept: CARDIOLOGY | Facility: CLINIC | Age: 89
End: 2024-08-19
Payer: MEDICARE

## 2024-08-19 DIAGNOSIS — I48.20 CHRONIC ATRIAL FIBRILLATION (MULTI): ICD-10-CM

## 2024-08-27 DIAGNOSIS — I25.10 CORONARY ARTERY DISEASE INVOLVING NATIVE CORONARY ARTERY OF NATIVE HEART WITHOUT ANGINA PECTORIS: ICD-10-CM

## 2024-08-27 DIAGNOSIS — E78.2 MIXED HYPERLIPIDEMIA: ICD-10-CM

## 2024-08-28 RX ORDER — POTASSIUM CHLORIDE 750 MG/1
TABLET, EXTENDED RELEASE ORAL
Qty: 180 TABLET | Refills: 3 | Status: SHIPPED | OUTPATIENT
Start: 2024-08-28

## 2024-08-28 NOTE — TELEPHONE ENCOUNTER
Please send the attached prescription to the patient's pharmacy as soon as possible. Thank you!    Requested Prescriptions     Pending Prescriptions Disp Refills    potassium chloride CR 10 mEq ER tablet [Pharmacy Med Name: POTASSIUM CL ER 10 MEQ TABLET] 180 tablet 3     Sig: TAKE 1 TABLET BY MOUTH TWICE A DAY WITH FOOD FOR 90 DAYS

## 2024-08-30 DIAGNOSIS — I48.20 CHRONIC ATRIAL FIBRILLATION (MULTI): ICD-10-CM

## 2024-08-30 DIAGNOSIS — R07.9 CHEST PAIN, UNSPECIFIED TYPE: ICD-10-CM

## 2024-08-30 NOTE — TELEPHONE ENCOUNTER
Shoshana Thomas  has called in to request a refill on her:    isosorbide mononitrate     Medication sent to pharmacy and Shoshana Thomas  will be notified of when available for / has been sent out for delivery      Requested Prescriptions     Pending Prescriptions Disp Refills    isosorbide mononitrate ER (Imdur) 60 mg 24 hr tablet 90 tablet 3     Sig: Take 1 tablet (60 mg) by mouth once daily.

## 2024-09-03 RX ORDER — ISOSORBIDE MONONITRATE 60 MG/1
60 TABLET, EXTENDED RELEASE ORAL DAILY
Qty: 90 TABLET | Refills: 3 | Status: SHIPPED | OUTPATIENT
Start: 2024-09-03 | End: 2025-08-29

## 2024-09-10 ENCOUNTER — APPOINTMENT (OUTPATIENT)
Dept: CARDIOLOGY | Facility: CLINIC | Age: 89
End: 2024-09-10
Payer: MEDICARE

## 2024-09-13 ENCOUNTER — ANTICOAGULATION - WARFARIN VISIT (OUTPATIENT)
Dept: CARDIOLOGY | Facility: CLINIC | Age: 89
End: 2024-09-13
Payer: MEDICARE

## 2024-09-13 DIAGNOSIS — I48.20 CHRONIC ATRIAL FIBRILLATION (MULTI): ICD-10-CM

## 2024-09-13 LAB
POC INR: 4.5
POC PROTHROMBIN TIME: NORMAL

## 2024-09-13 PROCEDURE — 99211 OFF/OP EST MAY X REQ PHY/QHP: CPT | Performed by: INTERNAL MEDICINE

## 2024-09-13 PROCEDURE — 85610 PROTHROMBIN TIME: CPT | Mod: QW

## 2024-09-13 NOTE — PROGRESS NOTES
Patient identification verified with 2 identifiers.    Location: [facility] - [address] [place] [address] [phone]     Referring Physician:  DR TAPIA  Enrollment/ Re-enrollment date: 7/3/2025  INR Goal: 2.0-3.0  INR monitoring is per [facility] protocol.  Anticoagulation Medication: warfarin  Indication: atrial fibrillation    Subjective   Bleeding signs/symptoms: No    Bruising: No   Major bleeding event: No  Thrombosis signs/symptoms: No  Thromboembolic event: No  Missed doses: No  Extra doses: No  Medication changes: No  Dietary changes: No  Change in health: No  WAS SICK FOR AROUND A MONTH AND NOT EATING MUCH  Change in activity: No  Alcohol: No  Other concerns: No    Upcoming Surgeries:  Does the Patient Have any upcoming surgeries that require interruption in anticoagulation therapy? no  Does the patient require bridging? no      Anticoagulation Summary  As of 2024      INR goal:  2.0-3.0   TTR:  79.6% (11.3 mo)   INR used for dosin.50 (2024)   Weekly warfarin total:  45 mg               Assessment/Plan   SUPRA Therapeutic     1. New dose: HOLD 2 DOSES AND DECREASE  2. Next INR: 1 WEEK      Education provided to patient during the visit:  Patient instructed to call in interim with questions, concerns and changes.

## 2024-09-19 ENCOUNTER — APPOINTMENT (OUTPATIENT)
Dept: CARDIOLOGY | Facility: CLINIC | Age: 89
End: 2024-09-19
Payer: MEDICARE

## 2024-09-19 ENCOUNTER — APPOINTMENT (OUTPATIENT)
Dept: RADIOLOGY | Facility: HOSPITAL | Age: 89
DRG: 552 | End: 2024-09-19
Payer: MEDICARE

## 2024-09-19 ENCOUNTER — APPOINTMENT (OUTPATIENT)
Dept: CARDIOLOGY | Facility: HOSPITAL | Age: 89
DRG: 552 | End: 2024-09-19
Payer: MEDICARE

## 2024-09-19 ENCOUNTER — TELEPHONE (OUTPATIENT)
Dept: CARDIOLOGY | Facility: CLINIC | Age: 89
End: 2024-09-19

## 2024-09-19 ENCOUNTER — HOSPITAL ENCOUNTER (INPATIENT)
Facility: HOSPITAL | Age: 89
LOS: 1 days | Discharge: HOME HEALTH | DRG: 552 | End: 2024-09-22
Attending: STUDENT IN AN ORGANIZED HEALTH CARE EDUCATION/TRAINING PROGRAM | Admitting: INTERNAL MEDICINE
Payer: MEDICARE

## 2024-09-19 DIAGNOSIS — R53.81 PHYSICAL DECONDITIONING: ICD-10-CM

## 2024-09-19 DIAGNOSIS — E11.65 TYPE 2 DIABETES MELLITUS WITH HYPERGLYCEMIA, WITH LONG-TERM CURRENT USE OF INSULIN: ICD-10-CM

## 2024-09-19 DIAGNOSIS — M54.6 ACUTE MIDLINE THORACIC BACK PAIN: ICD-10-CM
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
## 2024-09-19 DIAGNOSIS — Z79.4 TYPE 2 DIABETES MELLITUS WITH HYPERGLYCEMIA, WITH LONG-TERM CURRENT USE OF INSULIN: ICD-10-CM

## 2024-09-19 DIAGNOSIS — M25.512 BILATERAL SHOULDER PAIN, UNSPECIFIED CHRONICITY: ICD-10-CM

## 2024-09-19 DIAGNOSIS — M25.511 BILATERAL SHOULDER PAIN, UNSPECIFIED CHRONICITY: ICD-10-CM

## 2024-09-19 DIAGNOSIS — I48.20 CHRONIC ATRIAL FIBRILLATION (MULTI): ICD-10-CM
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
## 2024-09-19 LAB
ALBUMIN SERPL BCP-MCNC: 3.5 G/DL (ref 3.4–5)
ALP SERPL-CCNC: 72 U/L (ref 33–136)
ALT SERPL W P-5'-P-CCNC: 14 U/L (ref 7–45)
ANION GAP BLDV CALCULATED.4IONS-SCNC: 4 MMOL/L (ref 10–25)
ANION GAP SERPL CALCULATED.3IONS-SCNC: 14 MMOL/L (ref 10–20)
APTT PPP: 27.7 SECONDS (ref 22–32.5)
AST SERPL W P-5'-P-CCNC: 18 U/L (ref 9–39)
ATRIAL RATE: 178 BPM
BASE EXCESS BLDV CALC-SCNC: 14.7 MMOL/L (ref -2–3)
BASOPHILS # BLD AUTO: 0.06 X10*3/UL (ref 0–0.1)
BASOPHILS NFR BLD AUTO: 0.7 %
BILIRUB SERPL-MCNC: 0.8 MG/DL (ref 0–1.2)
BNP SERPL-MCNC: 98 PG/ML (ref 0–99)
BODY TEMPERATURE: 37 DEGREES CELSIUS
BUN SERPL-MCNC: 21 MG/DL (ref 6–23)
CA-I BLDV-SCNC: 1.19 MMOL/L (ref 1.1–1.33)
CALCIUM SERPL-MCNC: 9.5 MG/DL (ref 8.6–10.3)
CARDIAC TROPONIN I PNL SERPL HS: 21 NG/L (ref 0–13)
CARDIAC TROPONIN I PNL SERPL HS: 23 NG/L (ref 0–13)
CHLORIDE BLDV-SCNC: 103 MMOL/L (ref 98–107)
CHLORIDE SERPL-SCNC: 102 MMOL/L (ref 98–107)
CO2 SERPL-SCNC: 30 MMOL/L (ref 21–32)
CREAT SERPL-MCNC: 1.61 MG/DL (ref 0.5–1.05)
EGFRCR SERPLBLD CKD-EPI 2021: 30 ML/MIN/1.73M*2
EOSINOPHIL # BLD AUTO: 0.13 X10*3/UL (ref 0–0.4)
EOSINOPHIL NFR BLD AUTO: 1.4 %
ERYTHROCYTE [DISTWIDTH] IN BLOOD BY AUTOMATED COUNT: 14 % (ref 11.5–14.5)
GLUCOSE BLD MANUAL STRIP-MCNC: 113 MG/DL (ref 74–99)
GLUCOSE BLD MANUAL STRIP-MCNC: 177 MG/DL (ref 74–99)
GLUCOSE BLD MANUAL STRIP-MCNC: 209 MG/DL (ref 74–99)
GLUCOSE BLDV-MCNC: 172 MG/DL (ref 74–99)
GLUCOSE SERPL-MCNC: 155 MG/DL (ref 74–99)
HCO3 BLDV-SCNC: 37 MMOL/L (ref 22–26)
HCT VFR BLD AUTO: 39.1 % (ref 36–46)
HCT VFR BLD EST: 47 % (ref 36–46)
HGB BLD-MCNC: 13 G/DL (ref 12–16)
HGB BLDV-MCNC: 15.6 G/DL (ref 12–16)
IMM GRANULOCYTES # BLD AUTO: 0.03 X10*3/UL (ref 0–0.5)
IMM GRANULOCYTES NFR BLD AUTO: 0.3 % (ref 0–0.9)
INHALED O2 CONCENTRATION: 0 %
INR PPP: 1.5 (ref 0.9–1.2)
LACTATE BLDV-SCNC: 1.9 MMOL/L (ref 0.4–2)
LACTATE BLDV-SCNC: 2.4 MMOL/L (ref 0.4–2)
LYMPHOCYTES # BLD AUTO: 3.14 X10*3/UL (ref 0.8–3)
LYMPHOCYTES NFR BLD AUTO: 34.3 %
MAGNESIUM SERPL-MCNC: 1.86 MG/DL (ref 1.6–2.4)
MCH RBC QN AUTO: 32.7 PG (ref 26–34)
MCHC RBC AUTO-ENTMCNC: 33.2 G/DL (ref 32–36)
MCV RBC AUTO: 98 FL (ref 80–100)
MONOCYTES # BLD AUTO: 0.77 X10*3/UL (ref 0.05–0.8)
MONOCYTES NFR BLD AUTO: 8.4 %
NEUTROPHILS # BLD AUTO: 5.03 X10*3/UL (ref 1.6–5.5)
NEUTROPHILS NFR BLD AUTO: 54.9 %
NRBC BLD-RTO: 0 /100 WBCS (ref 0–0)
OXYHGB MFR BLDV: 42.7 % (ref 45–75)
PCO2 BLDV: 36 MM HG (ref 41–51)
PH BLDV: 7.62 PH (ref 7.33–7.43)
PLATELET # BLD AUTO: 211 X10*3/UL (ref 150–450)
PO2 BLDV: 37 MM HG (ref 35–45)
POTASSIUM BLDV-SCNC: 3.3 MMOL/L (ref 3.5–5.3)
POTASSIUM SERPL-SCNC: 3.3 MMOL/L (ref 3.5–5.3)
PROT SERPL-MCNC: 7.5 G/DL (ref 6.4–8.2)
PROTHROMBIN TIME: 15.6 SECONDS (ref 9.3–12.7)
Q ONSET: 226 MS
QRS COUNT: 10 BEATS
QRS DURATION: 80 MS
QT INTERVAL: 434 MS
QTC CALCULATION(BAZETT): 447 MS
QTC FREDERICIA: 443 MS
R AXIS: -2 DEGREES
RBC # BLD AUTO: 3.98 X10*6/UL (ref 4–5.2)
SAO2 % BLDV: 43 % (ref 45–75)
SODIUM BLDV-SCNC: 141 MMOL/L (ref 136–145)
SODIUM SERPL-SCNC: 143 MMOL/L (ref 136–145)
T AXIS: 14 DEGREES
T OFFSET: 443 MS
VENTRICULAR RATE: 64 BPM
WBC # BLD AUTO: 9.2 X10*3/UL (ref 4.4–11.3)

## 2024-09-19 PROCEDURE — 73030 X-RAY EXAM OF SHOULDER: CPT | Mod: LEFT SIDE | Performed by: RADIOLOGY

## 2024-09-19 PROCEDURE — 97165 OT EVAL LOW COMPLEX 30 MIN: CPT | Mod: GO

## 2024-09-19 PROCEDURE — 85025 COMPLETE CBC W/AUTO DIFF WBC: CPT | Performed by: STUDENT IN AN ORGANIZED HEALTH CARE EDUCATION/TRAINING PROGRAM

## 2024-09-19 PROCEDURE — 99223 1ST HOSP IP/OBS HIGH 75: CPT | Performed by: INTERNAL MEDICINE

## 2024-09-19 PROCEDURE — 70450 CT HEAD/BRAIN W/O DYE: CPT

## 2024-09-19 PROCEDURE — 96375 TX/PRO/DX INJ NEW DRUG ADDON: CPT

## 2024-09-19 PROCEDURE — 72125 CT NECK SPINE W/O DYE: CPT | Performed by: RADIOLOGY

## 2024-09-19 PROCEDURE — 72128 CT CHEST SPINE W/O DYE: CPT | Performed by: RADIOLOGY

## 2024-09-19 PROCEDURE — 96361 HYDRATE IV INFUSION ADD-ON: CPT

## 2024-09-19 PROCEDURE — 93005 ELECTROCARDIOGRAM TRACING: CPT

## 2024-09-19 PROCEDURE — 36415 COLL VENOUS BLD VENIPUNCTURE: CPT | Performed by: STUDENT IN AN ORGANIZED HEALTH CARE EDUCATION/TRAINING PROGRAM

## 2024-09-19 PROCEDURE — 83735 ASSAY OF MAGNESIUM: CPT | Performed by: STUDENT IN AN ORGANIZED HEALTH CARE EDUCATION/TRAINING PROGRAM

## 2024-09-19 PROCEDURE — 72125 CT NECK SPINE W/O DYE: CPT

## 2024-09-19 PROCEDURE — 84484 ASSAY OF TROPONIN QUANT: CPT | Performed by: STUDENT IN AN ORGANIZED HEALTH CARE EDUCATION/TRAINING PROGRAM

## 2024-09-19 PROCEDURE — 82947 ASSAY GLUCOSE BLOOD QUANT: CPT

## 2024-09-19 PROCEDURE — 70450 CT HEAD/BRAIN W/O DYE: CPT | Performed by: RADIOLOGY

## 2024-09-19 PROCEDURE — 83605 ASSAY OF LACTIC ACID: CPT | Performed by: STUDENT IN AN ORGANIZED HEALTH CARE EDUCATION/TRAINING PROGRAM

## 2024-09-19 PROCEDURE — 2500000002 HC RX 250 W HCPCS SELF ADMINISTERED DRUGS (ALT 637 FOR MEDICARE OP, ALT 636 FOR OP/ED): Performed by: INTERNAL MEDICINE

## 2024-09-19 PROCEDURE — 72128 CT CHEST SPINE W/O DYE: CPT

## 2024-09-19 PROCEDURE — 84132 ASSAY OF SERUM POTASSIUM: CPT | Performed by: STUDENT IN AN ORGANIZED HEALTH CARE EDUCATION/TRAINING PROGRAM

## 2024-09-19 PROCEDURE — 85730 THROMBOPLASTIN TIME PARTIAL: CPT | Performed by: STUDENT IN AN ORGANIZED HEALTH CARE EDUCATION/TRAINING PROGRAM

## 2024-09-19 PROCEDURE — 74177 CT ABD & PELVIS W/CONTRAST: CPT

## 2024-09-19 PROCEDURE — 2500000004 HC RX 250 GENERAL PHARMACY W/ HCPCS (ALT 636 FOR OP/ED): Performed by: INTERNAL MEDICINE

## 2024-09-19 PROCEDURE — 99285 EMERGENCY DEPT VISIT HI MDM: CPT | Mod: 25

## 2024-09-19 PROCEDURE — 74177 CT ABD & PELVIS W/CONTRAST: CPT | Performed by: RADIOLOGY

## 2024-09-19 PROCEDURE — 97161 PT EVAL LOW COMPLEX 20 MIN: CPT | Mod: GP

## 2024-09-19 PROCEDURE — 85610 PROTHROMBIN TIME: CPT | Performed by: STUDENT IN AN ORGANIZED HEALTH CARE EDUCATION/TRAINING PROGRAM

## 2024-09-19 PROCEDURE — 72131 CT LUMBAR SPINE W/O DYE: CPT

## 2024-09-19 PROCEDURE — 83880 ASSAY OF NATRIURETIC PEPTIDE: CPT | Performed by: STUDENT IN AN ORGANIZED HEALTH CARE EDUCATION/TRAINING PROGRAM

## 2024-09-19 PROCEDURE — G0378 HOSPITAL OBSERVATION PER HR: HCPCS

## 2024-09-19 PROCEDURE — 2500000001 HC RX 250 WO HCPCS SELF ADMINISTERED DRUGS (ALT 637 FOR MEDICARE OP): Performed by: INTERNAL MEDICINE

## 2024-09-19 PROCEDURE — 2550000001 HC RX 255 CONTRASTS: Performed by: STUDENT IN AN ORGANIZED HEALTH CARE EDUCATION/TRAINING PROGRAM

## 2024-09-19 PROCEDURE — 2500000004 HC RX 250 GENERAL PHARMACY W/ HCPCS (ALT 636 FOR OP/ED): Performed by: STUDENT IN AN ORGANIZED HEALTH CARE EDUCATION/TRAINING PROGRAM

## 2024-09-19 PROCEDURE — 73030 X-RAY EXAM OF SHOULDER: CPT | Mod: LT

## 2024-09-19 PROCEDURE — 71260 CT THORAX DX C+: CPT | Performed by: RADIOLOGY

## 2024-09-19 PROCEDURE — 96374 THER/PROPH/DIAG INJ IV PUSH: CPT

## 2024-09-19 PROCEDURE — 72131 CT LUMBAR SPINE W/O DYE: CPT | Performed by: RADIOLOGY

## 2024-09-19 RX ORDER — ATORVASTATIN CALCIUM 20 MG/1
20 TABLET, FILM COATED ORAL DAILY
Status: DISCONTINUED | OUTPATIENT
Start: 2024-09-19 | End: 2024-09-22 | Stop reason: HOSPADM

## 2024-09-19 RX ORDER — DEXTROSE 50 % IN WATER (D50W) INTRAVENOUS SYRINGE
25
Status: DISCONTINUED | OUTPATIENT
Start: 2024-09-19 | End: 2024-09-22 | Stop reason: HOSPADM

## 2024-09-19 RX ORDER — PANTOPRAZOLE SODIUM 40 MG/1
40 TABLET, DELAYED RELEASE ORAL
Status: DISCONTINUED | OUTPATIENT
Start: 2024-09-20 | End: 2024-09-22 | Stop reason: HOSPADM

## 2024-09-19 RX ORDER — TORSEMIDE 100 MG/1
50 TABLET ORAL 2 TIMES DAILY
Status: DISCONTINUED | OUTPATIENT
Start: 2024-09-19 | End: 2024-09-22 | Stop reason: HOSPADM

## 2024-09-19 RX ORDER — HYDRALAZINE HYDROCHLORIDE 50 MG/1
50 TABLET, FILM COATED ORAL 2 TIMES DAILY
Status: DISCONTINUED | OUTPATIENT
Start: 2024-09-19 | End: 2024-09-22 | Stop reason: HOSPADM

## 2024-09-19 RX ORDER — LATANOPROST 50 UG/ML
1 SOLUTION/ DROPS OPHTHALMIC NIGHTLY
Status: DISCONTINUED | OUTPATIENT
Start: 2024-09-19 | End: 2024-09-22 | Stop reason: HOSPADM

## 2024-09-19 RX ORDER — TALC
3 POWDER (GRAM) TOPICAL NIGHTLY PRN
Status: DISCONTINUED | OUTPATIENT
Start: 2024-09-19 | End: 2024-09-22 | Stop reason: HOSPADM

## 2024-09-19 RX ORDER — INSULIN GLARGINE 100 [IU]/ML
10 INJECTION, SOLUTION SUBCUTANEOUS EVERY MORNING
Status: DISCONTINUED | OUTPATIENT
Start: 2024-09-19 | End: 2024-09-22 | Stop reason: HOSPADM

## 2024-09-19 RX ORDER — ACETAMINOPHEN 325 MG/1
650 TABLET ORAL ONCE
Status: COMPLETED | OUTPATIENT
Start: 2024-09-19 | End: 2024-09-19

## 2024-09-19 RX ORDER — POLYETHYLENE GLYCOL 3350 17 G/17G
17 POWDER, FOR SOLUTION ORAL DAILY
Status: DISCONTINUED | OUTPATIENT
Start: 2024-09-19 | End: 2024-09-22 | Stop reason: HOSPADM

## 2024-09-19 RX ORDER — POTASSIUM CHLORIDE 750 MG/1
10 TABLET, FILM COATED, EXTENDED RELEASE ORAL
Status: DISCONTINUED | OUTPATIENT
Start: 2024-09-19 | End: 2024-09-22 | Stop reason: HOSPADM

## 2024-09-19 RX ORDER — ISOSORBIDE MONONITRATE 60 MG/1
60 TABLET, EXTENDED RELEASE ORAL DAILY
Status: DISCONTINUED | OUTPATIENT
Start: 2024-09-19 | End: 2024-09-22 | Stop reason: HOSPADM

## 2024-09-19 RX ORDER — ACETAMINOPHEN 160 MG/5ML
650 SOLUTION ORAL EVERY 4 HOURS PRN
Status: DISCONTINUED | OUTPATIENT
Start: 2024-09-19 | End: 2024-09-22 | Stop reason: HOSPADM

## 2024-09-19 RX ORDER — ACETAMINOPHEN 325 MG/1
650 TABLET ORAL EVERY 4 HOURS PRN
Status: DISCONTINUED | OUTPATIENT
Start: 2024-09-19 | End: 2024-09-22 | Stop reason: HOSPADM

## 2024-09-19 RX ORDER — TIMOLOL MALEATE 5 MG/ML
1 SOLUTION/ DROPS OPHTHALMIC EVERY MORNING
Status: DISCONTINUED | OUTPATIENT
Start: 2024-09-19 | End: 2024-09-22 | Stop reason: HOSPADM

## 2024-09-19 RX ORDER — ONDANSETRON HYDROCHLORIDE 2 MG/ML
4 INJECTION, SOLUTION INTRAVENOUS EVERY 8 HOURS PRN
Status: DISCONTINUED | OUTPATIENT
Start: 2024-09-19 | End: 2024-09-22 | Stop reason: HOSPADM

## 2024-09-19 RX ORDER — ONDANSETRON 4 MG/1
4 TABLET, ORALLY DISINTEGRATING ORAL EVERY 8 HOURS PRN
Status: DISCONTINUED | OUTPATIENT
Start: 2024-09-19 | End: 2024-09-22 | Stop reason: HOSPADM

## 2024-09-19 RX ORDER — ACETAMINOPHEN 650 MG/1
650 SUPPOSITORY RECTAL EVERY 4 HOURS PRN
Status: DISCONTINUED | OUTPATIENT
Start: 2024-09-19 | End: 2024-09-22 | Stop reason: HOSPADM

## 2024-09-19 RX ORDER — ONDANSETRON HYDROCHLORIDE 2 MG/ML
4 INJECTION, SOLUTION INTRAVENOUS ONCE
Status: COMPLETED | OUTPATIENT
Start: 2024-09-19 | End: 2024-09-19

## 2024-09-19 RX ORDER — CALCITRIOL 0.25 UG/1
0.25 CAPSULE ORAL DAILY
Status: DISCONTINUED | OUTPATIENT
Start: 2024-09-19 | End: 2024-09-22 | Stop reason: HOSPADM

## 2024-09-19 RX ORDER — WARFARIN 3 MG/1
3 TABLET ORAL DAILY
Status: DISCONTINUED | OUTPATIENT
Start: 2024-09-19 | End: 2024-09-22 | Stop reason: HOSPADM

## 2024-09-19 RX ORDER — POTASSIUM CHLORIDE 1.5 G/1.58G
40 POWDER, FOR SOLUTION ORAL ONCE
Status: DISCONTINUED | OUTPATIENT
Start: 2024-09-19 | End: 2024-09-19

## 2024-09-19 RX ORDER — TRAMADOL HYDROCHLORIDE 50 MG/1
50 TABLET ORAL EVERY 12 HOURS PRN
Status: DISCONTINUED | OUTPATIENT
Start: 2024-09-19 | End: 2024-09-22 | Stop reason: HOSPADM

## 2024-09-19 RX ORDER — CARVEDILOL 3.12 MG/1
3.12 TABLET ORAL
Status: DISCONTINUED | OUTPATIENT
Start: 2024-09-19 | End: 2024-09-22 | Stop reason: HOSPADM

## 2024-09-19 RX ORDER — MORPHINE SULFATE 4 MG/ML
4 INJECTION, SOLUTION INTRAMUSCULAR; INTRAVENOUS ONCE
Status: COMPLETED | OUTPATIENT
Start: 2024-09-19 | End: 2024-09-19

## 2024-09-19 RX ORDER — DEXTROSE 50 % IN WATER (D50W) INTRAVENOUS SYRINGE
12.5
Status: DISCONTINUED | OUTPATIENT
Start: 2024-09-19 | End: 2024-09-22 | Stop reason: HOSPADM

## 2024-09-19 RX ORDER — FERROUS GLUCONATE 325 MG
38 TABLET ORAL EVERY OTHER DAY
Status: DISCONTINUED | OUTPATIENT
Start: 2024-09-20 | End: 2024-09-22 | Stop reason: HOSPADM

## 2024-09-19 RX ADMIN — ACETAMINOPHEN 650 MG: 325 TABLET ORAL at 07:36

## 2024-09-19 RX ADMIN — ACETAMINOPHEN 650 MG: 325 TABLET ORAL at 15:41

## 2024-09-19 RX ADMIN — ISOSORBIDE MONONITRATE 60 MG: 60 TABLET, EXTENDED RELEASE ORAL at 13:06

## 2024-09-19 RX ADMIN — TIMOLOL MALEATE 1 DROP: 5 SOLUTION/ DROPS OPHTHALMIC at 15:04

## 2024-09-19 RX ADMIN — HYDRALAZINE HYDROCHLORIDE 50 MG: 50 TABLET ORAL at 13:06

## 2024-09-19 RX ADMIN — MORPHINE SULFATE 4 MG: 4 INJECTION, SOLUTION INTRAMUSCULAR; INTRAVENOUS at 05:32

## 2024-09-19 RX ADMIN — POLYETHYLENE GLYCOL 3350 17 G: 17 POWDER, FOR SOLUTION ORAL at 13:27

## 2024-09-19 RX ADMIN — LATANOPROST 1 DROP: 50 SOLUTION/ DROPS OPHTHALMIC at 21:36

## 2024-09-19 RX ADMIN — INSULIN GLARGINE 10 UNITS: 100 INJECTION, SOLUTION SUBCUTANEOUS at 15:05

## 2024-09-19 RX ADMIN — CALCITRIOL CAPSULES 0.25 MCG 0.25 MCG: 0.25 CAPSULE ORAL at 13:05

## 2024-09-19 RX ADMIN — ONDANSETRON 4 MG: 2 INJECTION INTRAMUSCULAR; INTRAVENOUS at 05:32

## 2024-09-19 RX ADMIN — WARFARIN SODIUM 3 MG: 3 TABLET ORAL at 17:53

## 2024-09-19 RX ADMIN — HYDRALAZINE HYDROCHLORIDE 50 MG: 50 TABLET ORAL at 21:36

## 2024-09-19 RX ADMIN — SODIUM CHLORIDE 1000 ML: 900 INJECTION, SOLUTION INTRAVENOUS at 05:32

## 2024-09-19 RX ADMIN — POTASSIUM CHLORIDE 10 MEQ: 750 TABLET, EXTENDED RELEASE ORAL at 13:06

## 2024-09-19 RX ADMIN — TORSEMIDE 50 MG: 100 TABLET ORAL at 13:07

## 2024-09-19 RX ADMIN — CARVEDILOL 3.12 MG: 3.12 TABLET, FILM COATED ORAL at 17:52

## 2024-09-19 RX ADMIN — IOHEXOL 75 ML: 350 INJECTION, SOLUTION INTRAVENOUS at 05:49

## 2024-09-19 RX ADMIN — ATORVASTATIN CALCIUM 20 MG: 20 TABLET, FILM COATED ORAL at 13:10

## 2024-09-19 RX ADMIN — POTASSIUM CHLORIDE 10 MEQ: 750 TABLET, EXTENDED RELEASE ORAL at 17:52

## 2024-09-19 SDOH — ECONOMIC STABILITY: INCOME INSECURITY: IN THE PAST 12 MONTHS HAS THE ELECTRIC, GAS, OIL, OR WATER COMPANY THREATENED TO SHUT OFF SERVICES IN YOUR HOME?: NO

## 2024-09-19 SDOH — SOCIAL STABILITY: SOCIAL INSECURITY: DOES ANYONE TRY TO KEEP YOU FROM HAVING/CONTACTING OTHER FRIENDS OR DOING THINGS OUTSIDE YOUR HOME?: NO

## 2024-09-19 SDOH — ECONOMIC STABILITY: FOOD INSECURITY: WITHIN THE PAST 12 MONTHS, THE FOOD YOU BOUGHT JUST DIDN'T LAST AND YOU DIDN'T HAVE MONEY TO GET MORE.: NEVER TRUE

## 2024-09-19 SDOH — SOCIAL STABILITY: SOCIAL INSECURITY: HAVE YOU HAD ANY THOUGHTS OF HARMING ANYONE ELSE?: NO

## 2024-09-19 SDOH — ECONOMIC STABILITY: HOUSING INSECURITY: AT ANY TIME IN THE PAST 12 MONTHS, WERE YOU HOMELESS OR LIVING IN A SHELTER (INCLUDING NOW)?: NO

## 2024-09-19 SDOH — ECONOMIC STABILITY: INCOME INSECURITY: IN THE PAST 12 MONTHS, HAS THE ELECTRIC, GAS, OIL, OR WATER COMPANY THREATENED TO SHUT OFF SERVICE IN YOUR HOME?: NO

## 2024-09-19 SDOH — ECONOMIC STABILITY: TRANSPORTATION INSECURITY
IN THE PAST 12 MONTHS, HAS LACK OF TRANSPORTATION KEPT YOU FROM MEETINGS, WORK, OR FROM GETTING THINGS NEEDED FOR DAILY LIVING?: NO

## 2024-09-19 SDOH — HEALTH STABILITY: MENTAL HEALTH
HOW OFTEN DO YOU NEED TO HAVE SOMEONE HELP YOU WHEN YOU READ INSTRUCTIONS, PAMPHLETS, OR OTHER WRITTEN MATERIAL FROM YOUR DOCTOR OR PHARMACY?: SOMETIMES

## 2024-09-19 SDOH — SOCIAL STABILITY: SOCIAL INSECURITY: ARE THERE ANY APPARENT SIGNS OF INJURIES/BEHAVIORS THAT COULD BE RELATED TO ABUSE/NEGLECT?: NO

## 2024-09-19 SDOH — SOCIAL STABILITY: SOCIAL INSECURITY
WITHIN THE LAST YEAR, HAVE YOU BEEN KICKED, HIT, SLAPPED, OR OTHERWISE PHYSICALLY HURT BY YOUR PARTNER OR EX-PARTNER?: NO

## 2024-09-19 SDOH — ECONOMIC STABILITY: TRANSPORTATION INSECURITY
IN THE PAST 12 MONTHS, HAS THE LACK OF TRANSPORTATION KEPT YOU FROM MEDICAL APPOINTMENTS OR FROM GETTING MEDICATIONS?: NO

## 2024-09-19 SDOH — ECONOMIC STABILITY: FOOD INSECURITY: WITHIN THE PAST 12 MONTHS, YOU WORRIED THAT YOUR FOOD WOULD RUN OUT BEFORE YOU GOT MONEY TO BUY MORE.: NEVER TRUE

## 2024-09-19 SDOH — SOCIAL STABILITY: SOCIAL INSECURITY: ARE YOU OR HAVE YOU BEEN THREATENED OR ABUSED PHYSICALLY, EMOTIONALLY, OR SEXUALLY BY ANYONE?: NO

## 2024-09-19 SDOH — ECONOMIC STABILITY: HOUSING INSECURITY: IN THE LAST 12 MONTHS, WAS THERE A TIME WHEN YOU WERE NOT ABLE TO PAY THE MORTGAGE OR RENT ON TIME?: NO

## 2024-09-19 SDOH — SOCIAL STABILITY: SOCIAL INSECURITY: WITHIN THE LAST YEAR, HAVE YOU BEEN AFRAID OF YOUR PARTNER OR EX-PARTNER?: NO

## 2024-09-19 SDOH — SOCIAL STABILITY: SOCIAL INSECURITY: HAVE YOU HAD THOUGHTS OF HARMING ANYONE ELSE?: NO

## 2024-09-19 SDOH — SOCIAL STABILITY: SOCIAL INSECURITY: WITHIN THE LAST YEAR, HAVE YOU BEEN HUMILIATED OR EMOTIONALLY ABUSED IN OTHER WAYS BY YOUR PARTNER OR EX-PARTNER?: NO

## 2024-09-19 SDOH — ECONOMIC STABILITY: FOOD INSECURITY: HOW HARD IS IT FOR YOU TO PAY FOR THE VERY BASICS LIKE FOOD, HOUSING, MEDICAL CARE, AND HEATING?: NOT HARD AT ALL

## 2024-09-19 SDOH — ECONOMIC STABILITY: HOUSING INSECURITY: IN THE PAST 12 MONTHS, HOW MANY TIMES HAVE YOU MOVED WHERE YOU WERE LIVING?: 0

## 2024-09-19 SDOH — SOCIAL STABILITY: SOCIAL INSECURITY: DO YOU FEEL ANYONE HAS EXPLOITED OR TAKEN ADVANTAGE OF YOU FINANCIALLY OR OF YOUR PERSONAL PROPERTY?: NO

## 2024-09-19 SDOH — SOCIAL STABILITY: SOCIAL INSECURITY
WITHIN THE LAST YEAR, HAVE TO BEEN RAPED OR FORCED TO HAVE ANY KIND OF SEXUAL ACTIVITY BY YOUR PARTNER OR EX-PARTNER?: NO

## 2024-09-19 SDOH — SOCIAL STABILITY: SOCIAL INSECURITY: ABUSE: ADULT

## 2024-09-19 SDOH — SOCIAL STABILITY: SOCIAL INSECURITY
WITHIN THE LAST YEAR, HAVE YOU BEEN RAPED OR FORCED TO HAVE ANY KIND OF SEXUAL ACTIVITY BY YOUR PARTNER OR EX-PARTNER?: NO

## 2024-09-19 SDOH — ECONOMIC STABILITY: TRANSPORTATION INSECURITY: IN THE PAST 12 MONTHS, HAS LACK OF TRANSPORTATION KEPT YOU FROM MEDICAL APPOINTMENTS OR FROM GETTING MEDICATIONS?: NO

## 2024-09-19 SDOH — ECONOMIC STABILITY: FOOD INSECURITY: WITHIN THE PAST 12 MONTHS, YOU WORRIED THAT YOUR FOOD WOULD RUN OUT BEFORE YOU GOT THE MONEY TO BUY MORE.: NEVER TRUE

## 2024-09-19 SDOH — HEALTH STABILITY: MENTAL HEALTH: EXPERIENCED ANY OF THE FOLLOWING LIFE EVENTS: DEATH OF FAMILY/FRIEND

## 2024-09-19 SDOH — ECONOMIC STABILITY: INCOME INSECURITY: IN THE LAST 12 MONTHS, WAS THERE A TIME WHEN YOU WERE NOT ABLE TO PAY THE MORTGAGE OR RENT ON TIME?: NO

## 2024-09-19 SDOH — SOCIAL STABILITY: SOCIAL INSECURITY: DO YOU FEEL UNSAFE GOING BACK TO THE PLACE WHERE YOU ARE LIVING?: NO

## 2024-09-19 SDOH — SOCIAL STABILITY: SOCIAL INSECURITY: HAS ANYONE EVER THREATENED TO HURT YOUR FAMILY OR YOUR PETS?: NO

## 2024-09-19 SDOH — ECONOMIC STABILITY: INCOME INSECURITY: HOW HARD IS IT FOR YOU TO PAY FOR THE VERY BASICS LIKE FOOD, HOUSING, MEDICAL CARE, AND HEATING?: NOT HARD AT ALL

## 2024-09-19 ASSESSMENT — COLUMBIA-SUICIDE SEVERITY RATING SCALE - C-SSRS
2. HAVE YOU ACTUALLY HAD ANY THOUGHTS OF KILLING YOURSELF?: NO
1. IN THE PAST MONTH, HAVE YOU WISHED YOU WERE DEAD OR WISHED YOU COULD GO TO SLEEP AND NOT WAKE UP?: NO
6. HAVE YOU EVER DONE ANYTHING, STARTED TO DO ANYTHING, OR PREPARED TO DO ANYTHING TO END YOUR LIFE?: NO

## 2024-09-19 ASSESSMENT — PATIENT HEALTH QUESTIONNAIRE - PHQ9
2. FEELING DOWN, DEPRESSED OR HOPELESS: NOT AT ALL
1. LITTLE INTEREST OR PLEASURE IN DOING THINGS: NOT AT ALL
SUM OF ALL RESPONSES TO PHQ9 QUESTIONS 1 & 2: 0

## 2024-09-19 ASSESSMENT — COGNITIVE AND FUNCTIONAL STATUS - GENERAL
STANDING UP FROM CHAIR USING ARMS: A LITTLE
TOILETING: A LITTLE
DRESSING REGULAR LOWER BODY CLOTHING: A LITTLE
MOVING TO AND FROM BED TO CHAIR: A LITTLE
WALKING IN HOSPITAL ROOM: A LITTLE
MOVING TO AND FROM BED TO CHAIR: A LITTLE
DAILY ACTIVITIY SCORE: 18
PERSONAL GROOMING: A LITTLE
DRESSING REGULAR LOWER BODY CLOTHING: A LOT
EATING MEALS: A LITTLE
MOVING FROM LYING ON BACK TO SITTING ON SIDE OF FLAT BED WITH BEDRAILS: A LITTLE
MOVING FROM LYING ON BACK TO SITTING ON SIDE OF FLAT BED WITH BEDRAILS: A LITTLE
DAILY ACTIVITIY SCORE: 16
EATING MEALS: A LITTLE
MOBILITY SCORE: 16
CLIMB 3 TO 5 STEPS WITH RAILING: A LITTLE
TOILETING: A LOT
TURNING FROM BACK TO SIDE WHILE IN FLAT BAD: A LOT
DRESSING REGULAR UPPER BODY CLOTHING: A LITTLE
MOBILITY SCORE: 16
STANDING UP FROM CHAIR USING ARMS: A LITTLE
WALKING IN HOSPITAL ROOM: A LITTLE
DRESSING REGULAR UPPER BODY CLOTHING: A LITTLE
PERSONAL GROOMING: A LITTLE
DRESSING REGULAR LOWER BODY CLOTHING: A LITTLE
HELP NEEDED FOR BATHING: A LITTLE
CLIMB 3 TO 5 STEPS WITH RAILING: A LOT
HELP NEEDED FOR BATHING: A LOT
DRESSING REGULAR UPPER BODY CLOTHING: A LITTLE
TURNING FROM BACK TO SIDE WHILE IN FLAT BAD: A LOT
MOVING FROM LYING ON BACK TO SITTING ON SIDE OF FLAT BED WITH BEDRAILS: A LOT
MOBILITY SCORE: 16
CLIMB 3 TO 5 STEPS WITH RAILING: A LOT
STANDING UP FROM CHAIR USING ARMS: A LITTLE
HELP NEEDED FOR BATHING: A LITTLE
PATIENT BASELINE BEDBOUND: NO
TOILETING: A LITTLE
WALKING IN HOSPITAL ROOM: A LITTLE
PERSONAL GROOMING: A LITTLE
MOVING TO AND FROM BED TO CHAIR: A LITTLE
TURNING FROM BACK TO SIDE WHILE IN FLAT BAD: A LOT
DAILY ACTIVITIY SCORE: 18

## 2024-09-19 ASSESSMENT — PAIN SCALES - GENERAL
PAINLEVEL_OUTOF10: 10 - WORST POSSIBLE PAIN
PAINLEVEL_OUTOF10: 5 - MODERATE PAIN
PAINLEVEL_OUTOF10: 10 - WORST POSSIBLE PAIN
PAINLEVEL_OUTOF10: 7
PAINLEVEL_OUTOF10: 3
PAINLEVEL_OUTOF10: 8
PAINLEVEL_OUTOF10: 3

## 2024-09-19 ASSESSMENT — LIFESTYLE VARIABLES
HOW MANY STANDARD DRINKS CONTAINING ALCOHOL DO YOU HAVE ON A TYPICAL DAY: PATIENT DOES NOT DRINK
AUDIT-C TOTAL SCORE: 0
HOW OFTEN DO YOU HAVE 6 OR MORE DRINKS ON ONE OCCASION: NEVER
HOW OFTEN DO YOU HAVE A DRINK CONTAINING ALCOHOL: NEVER
SUBSTANCE_ABUSE_PAST_12_MONTHS: NO
AUDIT-C TOTAL SCORE: 0
SKIP TO QUESTIONS 9-10: 1
PRESCIPTION_ABUSE_PAST_12_MONTHS: NO

## 2024-09-19 ASSESSMENT — ACTIVITIES OF DAILY LIVING (ADL)
LACK_OF_TRANSPORTATION: NO
FEEDING YOURSELF: INDEPENDENT
BATHING: NEEDS ASSISTANCE
WALKS IN HOME: NEEDS ASSISTANCE
GROOMING: NEEDS ASSISTANCE
DRESSING YOURSELF: NEEDS ASSISTANCE
PATIENT'S MEMORY ADEQUATE TO SAFELY COMPLETE DAILY ACTIVITIES?: YES
ADEQUATE_TO_COMPLETE_ADL: YES
ADL_ASSISTANCE: INDEPENDENT
LACK_OF_TRANSPORTATION: NO
ADL_ASSISTANCE: INDEPENDENT
BATHING_ASSISTANCE: MINIMAL
JUDGMENT_ADEQUATE_SAFELY_COMPLETE_DAILY_ACTIVITIES: YES
HEARING - RIGHT EAR: DIFFICULTY WITH NOISE
HEARING - LEFT EAR: DIFFICULTY WITH NOISE
TOILETING: NEEDS ASSISTANCE

## 2024-09-19 ASSESSMENT — PAIN - FUNCTIONAL ASSESSMENT
PAIN_FUNCTIONAL_ASSESSMENT: 0-10

## 2024-09-19 ASSESSMENT — PAIN DESCRIPTION - FREQUENCY: FREQUENCY: CONSTANT/CONTINUOUS

## 2024-09-19 ASSESSMENT — PAIN SCALES - WONG BAKER: WONGBAKER_NUMERICALRESPONSE: HURTS LITTLE BIT

## 2024-09-19 ASSESSMENT — PAIN DESCRIPTION - PAIN TYPE: TYPE: ACUTE PAIN

## 2024-09-19 ASSESSMENT — PAIN DESCRIPTION - DESCRIPTORS: DESCRIPTORS: ACHING

## 2024-09-19 NOTE — ED PROVIDER NOTES
HPI   Chief Complaint   Patient presents with    Fall     BIB ems for fall last night at 2130, hit head, +thinners (coumadin). C/o pain in head, tailbone, neck and b/l shoulders       Patient presents to ED for reported fall around 9:30 PM last night.  Per grandson patient experienced unwitnessed fall and required assistance getting up but was able to ambulate after the incident.  Patient reports having chronic shoulder pain and unable to reach high above for anything which caused her to lose her balance falling backwards hitting her head.  She does not endorse any LOC but notes headache and neck pain along with left shoulder pain.  Patient called EMS secondary to inability to get up this morning around 4 AM to use the bathroom and worsening pain of neck and left shoulder.  Patient denies any prodromal/antecedent symptoms to include cardiopulmonary symptoms, GI symptoms, urinary symptoms, neurodeficits.  States she has been in overall good health, no appreciable infectious symptoms to include fever/chills.  States she does take Coumadin.  Denies any other significant systemic symptoms or complaints.  Notes chronic BLE knee pain that is not new since incident.  Notes chronic right shoulder pain that is not new since incident.  Primary left shoulder pain with limited ROM secondary to pain since fall.  Denies any other significant injuries or pain.              Patient History   Past Medical History:   Diagnosis Date    Adrenal mass (Multi)     At risk for bleeding 09/07/2023    Atherosclerotic heart disease of native coronary artery without angina pectoris 09/07/2023    Cataract     Chronic atrial fibrillation (Multi) 09/07/2023    Chronic diastolic heart failure (Multi)     Glaucoma     History of ischemic stroke 09/07/2023    Hyperlipidemia     Hypertension     Iron deficiency anemia     Mixed hyperlipidemia 09/07/2023    Syncope and collapse 09/07/2023    Type 2 diabetes mellitus (Multi)     Vitamin B12 deficiency   "    Past Surgical History:   Procedure Laterality Date    MR HEAD ANGIO WO IV CONTRAST  06/14/2016    MR HEAD ANGIO WO IV CONTRAST [facility]     Family History   Problem Relation Name Age of Onset    Other (VAD) Mother      Diabetes Father      Other (Sepsis) Father      Other (DJD) Sister      Diabetes Sister      Factor V Leiden deficiency Sister      Other (CKD) Brother      Heart disease Other      Hypertension Other      Cancer Other      Stroke Other       Social History     Tobacco Use    Smoking status: Never     Passive exposure: Never    Smokeless tobacco: Never   Vaping Use    Vaping status: Never Used   Substance Use Topics    Alcohol use: Yes     Comment: very rarley\ Occasional    Drug use: Never       Physical Exam   /69 (BP Location: Right arm, Patient Position: Lying)   Pulse 51   Temp 36.3 °C (97.3 °F) (Oral)   Resp 19   Ht 1.602 m (5' 3.07\")   Wt 90.3 kg (199 lb 1.2 oz)   LMP  (LMP Unknown)   SpO2 97%   BMI 35.19 kg/m²       Physical Exam  Vitals and nursing note reviewed.   Constitutional:       Appearance: Normal appearance. She is normal weight.   HENT:      Head: Normocephalic and atraumatic.      Comments: No cephalhematoma or calvarium depressions     Right Ear: Tympanic membrane and external ear normal.      Left Ear: Tympanic membrane and external ear normal.      Ears:      Comments: No hemotympanum bilaterally     Nose: Nose normal.      Comments: No epistaxis or septal hematoma     Mouth/Throat:      Mouth: Mucous membranes are moist.      Pharynx: Oropharynx is clear.      Comments: No perioral/intraoral lesions/injuries, no appreciable dental fractures or avulsions/impactions, no appreciable dental malocclusion, mandible is nontender and no gross deformities  Eyes:      Extraocular Movements: Extraocular movements intact.      Conjunctiva/sclera: Conjunctivae normal.      Pupils: Pupils are equal, round, and reactive to light.      Comments: Pupils 4-2 mm " equal and symmetrically reactive, no appreciable periorbital injuries/angioedema, no proptosis, periorbital rims are intact   Cardiovascular:      Rate and Rhythm: Normal rate and regular rhythm.      Pulses:           Radial pulses are 2+ on the right side and 2+ on the left side.        Dorsalis pedis pulses are 2+ on the right side and 2+ on the left side.      Heart sounds: Normal heart sounds. Heart sounds not distant. No murmur heard.     Comments: Equal and symmetrical distal pulses (BUE/BLE)  Pulmonary:      Effort: Pulmonary effort is normal. No respiratory distress.      Breath sounds: Normal breath sounds and air entry. No decreased air movement. No decreased breath sounds.      Comments: CTAB, no appreciable chest wall tenderness, no crepitus/deformities, no appreciable paradoxical movement with respiratory effort, equal and symmetrical chest rise with respiratory effort  Chest:      Chest wall: No tenderness.      Comments: Appreciable chest wall tenderness, no appreciable crepitus/deformities or paradoxical chest wall movement with respiratory effort  Abdominal:      General: Abdomen is protuberant. There is no distension.      Palpations: Abdomen is soft. There is no mass.      Tenderness: There is abdominal tenderness in the right upper quadrant and right lower quadrant. There is no guarding or rebound.      Comments: No evidence of trauma, no appreciable ecchymosis, mild to moderate tenderness of right sided abdomen that is endorsed to be chronic, no appreciable abdominal wall rigidity/voluntary guarding, not endorsing associated rebound tenderness to palpation, no pain out of proportion, no appreciable abdominal distention close increased tympany, no appreciable mass, no evidence of [place]'s/Sims's sign   Musculoskeletal:      Left shoulder: Tenderness, bony tenderness and crepitus present. No swelling or deformity. Decreased range of motion.      Left upper arm: Normal.      Cervical back:  Normal. No signs of trauma, tenderness or bony tenderness.      Thoracic back: Tenderness and bony tenderness present. No signs of trauma.      Lumbar back: Normal. No signs of trauma, tenderness or bony tenderness.      Right lower leg: No edema.      Left lower leg: No edema.      Comments: C-collar in place, no appreciable midline lower thoracic spine tenderness, no appreciable spinous process step-off/deformities involving T/L-spine, no gross evidence of trauma, tenderness to left shoulder with limited AROM and appreciable crepitus with PROM, LUE neurovascular intact, no appreciable tenderness palpation to mid/distal joints and no other gross deformities of LUE, no other gross deformities or injuries to RUE or BLE, well-healed midline vertical incisions of bilateral knees consistent with history of arthroplasty, pelvic stable to compression   Neurological:      General: No focal deficit present.      Mental Status: She is alert and oriented to person, place, and time.      GCS: GCS eye subscore is 4. GCS verbal subscore is 5. GCS motor subscore is 6.      Cranial Nerves: Cranial nerves 2-12 are intact. No facial asymmetry.      Sensory: Sensation is intact. No sensory deficit.      Motor: Motor function is intact. No weakness or abnormal muscle tone.      Comments: Gross motor/sensation intact x 4 (BUE/BLE)           ED Course & MDM   ED Course as of 09/19/24 2209   Thu Sep 19, 2024   0501 VS notable for severely hypertensive on presentation the setting of reported fall last night around 9 PM with reported head/neck pain, remaining VSS [BC]   0502 I personally reviewed and interpreted the EKG @0502: Afib 64, normal axis, no appreciable ischemia, non-specific TW findings, no prior EKG available for review, and significant TW flattening throughout. [BC]   0557 CBC and Auto Differential(!)  unremarkable and noncontributory to Patient condition/symptoms   [BC]   0558 Blood Gas Venous Full Panel(!!)  Severe metabolic  "alkalosis and mild hypokalemia [BC]      ED Course User Index  [BC] Carlos Nesbitt MD         Diagnoses as of 09/19/24 2209   Fall, initial encounter   Acute midline thoracic back pain   Bilateral shoulder pain, unspecified chronicity                 No data recorded     [place] Coma Scale Score: 15 (09/19/24 1057 : Ashley Hernandez, RN)       NIH Stroke Scale: 0 (09/19/24 0539 : Anita Gu RN)                   Medical Decision Making  Patient presented to the ED for mechanical fall around 9:30 PM last night with head strike, no reported LOC, on Coumadin, worsening neck and left shoulder pain with concerning PMHx of CAD, anemia, HTN, DM2, HLD, A-fib on warfarin.  I personally reviewed and interpreted VS, labs, images, and EKG which are as stated above in the ED course.    Assessment/evaluation consistent with mechanical fall and blunt force trauma with CHI on Coumadin with concern for occult ICH although mentating well, lower sufficient for cervical fracture given no BUE symptoms, significant crepitus with palpation of left shoulder concern for occult glenohumeral fracture, low suspicion for cardiac etiology, evidence of significant metabolic alkalosis of unclear etiology with concern for medication induced alkalosis.  No concerning history, clinical evidence/work-up, or exam findings for the considered differentials of sepsis, ACS/MI, malignant cardiac arrhythmia, acute HF/fluid overload, anemia.  These conditions have been thoroughly evaluated and determined to be sufficiently unlikely to be the etiology of patient's presenting symptoms.    Patient signed out to oncoming provider, Dr. Reinier Calle, at 10:08 PM in stable condition.    /69 (BP Location: Right arm, Patient Position: Lying)   Pulse 51   Temp 36.3 °C (97.3 °F) (Oral)   Resp 19   Ht 1.602 m (5' 3.07\")   Wt 90.3 kg (199 lb 1.2 oz)   LMP  (LMP Unknown)   SpO2 97%   BMI 35.19 kg/m²     Remaining workup:  Labs CMP, Trop, " BNP, Coags, Mag, Images CTH, CT C/T/L, CT C/A/P, XR L shoulder, and Reassessment    Patient disposition Medicine versus Trauma Admission and alternative disposition Discharge Home.      Per Chart Review: Nothing pertinent to this ED encounter.      Parts of this chart have been completed using voice-to-tect recognition software. Please excuse any errors of transcription that were missed for editing/correcting.    Amount and/or Complexity of Data Reviewed  Labs: ordered. Decision-making details documented in ED Course.  ECG/medicine tests: ordered and independent interpretation performed. Decision-making details documented in ED Course.        Procedure  Procedures     Carlos Nesbitt MD  09/19/24 5648

## 2024-09-19 NOTE — NURSING NOTE
Pt arrived from ER in w/c assisted to bed with 1 person mod assist .Oriented to room and call bell ,how to work bed ,bed alarm explained to pt.

## 2024-09-19 NOTE — PROGRESS NOTES
Occupational Therapy    Evaluation    Patient Name: Shoshana Thomas  MRN: [id number]  Department: [address]  Room: [address] [place], [place] [address]  Today's Date: 9/19/2024  Time Calculation  Start Time: 1329  Stop Time: 1348  Time Calculation (min): 19 min    Assessment  IP OT Assessment  OT Assessment: Patient is an 89 year old female admitted s/p fall at home. Patient is presenting below baseline level with a decline in strength, balance, activity tolerance, and function, resulting in an increased need for assistance with ADL tasks. Recommend skilled OT at a low intensity level and 24/7 supervision for safety to increase patient's safety and independence with daily tasks.  Prognosis: Good  Barriers to Discharge: None  Evaluation/Treatment Tolerance: Patient limited by pain  End of Session Communication: Bedside nurse  End of Session Patient Position: Bed, 3 rail up, Alarm on  Plan:  Treatment Interventions: ADL retraining, Functional transfer training, UE strengthening/ROM, Endurance training, Patient/family training, Compensatory technique education  OT Frequency: 3 times per week  OT Discharge Recommendations: Low intensity level of continued care, 24 hr supervision due to cognition  Equipment Recommended upon Discharge: Wheeled walker  OT Recommended Transfer Status: Assist of 1  OT - OK to Discharge: Yes    Subjective   Current Problem:  1. Fall, initial encounter        2. Acute midline thoracic back pain        3. Bilateral shoulder pain, unspecified chronicity          General:  General  Reason for Referral: decline in ADLs; fall  Referred By: Martínez Buckley MD  Past Medical History Relevant to Rehab: anemia, CAD, heart failure, CKD, ischemic stroke, HLD, DM type 2  Family/Caregiver Present: No  Prior to Session Communication: Bedside nurse  Patient Position Received: Bed, 3 rail up, Alarm on  Preferred Learning Style: verbal, visual  General Comment: Patient is an 89 year old female admitted s/p fall at home. All imaging  negative at this time except chronic lumbar and thoracic spine fractures seen. Patient is cleared by nursing for therapy. Patient in bed upon arrival and agreeable to therapy.  Precautions:  Hearing/Visual Limitations: mild hard of hearing  Medical Precautions: Fall precautions  Precautions Comment: education in spinal precautions for pain management    Pain:  Pain Assessment  Pain Assessment: 0-10  0-10 (Numeric) Pain Score: 10 - Worst possible pain  Pain Type: Acute pain  Pain Location: Buttocks (Tailbone)  Pain Interventions: Repositioned, Ambulation/increased activity (waffle cushion provided for bed)    Objective   Cognition:  Orientation Level: Oriented X4  Cognition Comments: able to follow commands throughout     Home Living:  Type of Home: House  Lives With: Grandchildren (Grand DTR+grand son in law)  Home Adaptive Equipment: Walker rolling or standard, Wheelchair-manual (Recliner/Lift Chair, Hospital bed)  Home Layout: Able to live on main level with bedroom/bathroom, Multi-level  Home Access: Stairs to enter with rails  Entrance Stairs-Rails: Both  Entrance Stairs-Number of Steps: 4  Bathroom Shower/Tub: Walk-in shower  Bathroom Equipment: Grab bars around toilet, Grab bars in shower, Shower chair with back  Home Living Comments: patient denies any other falls   Prior Function:  Level of [place]: Independent with ADLs and functional transfers  Receives Help From: Family  ADL Assistance: Independent (dressing/bathing)  Homemaking Assistance: Needs assistance (done by family)  Ambulatory Assistance:  (2WW in home, w/c for greater distances)  IADL History:  Homemaking Responsibilities: No  IADL Comments: family manages  ADL:  Eating Assistance: Stand by  Eating Deficit: Setup (per clinical judgement)  Grooming Assistance: Stand by  Grooming Deficit: Setup, Supervision/safety (per clinical judgement, seated)  Bathing Assistance: Minimal  Bathing Deficit: Right lower leg including foot, Left lower leg  including foot, Use of adaptive equipment (per clinical judgement)  UE Dressing Assistance: Minimal  UE Dressing Deficit: Pull around back, Pull down in back (per clinical judgement)  LE Dressing Assistance: Minimal  LE Dressing Deficit: Use of adaptive equipment, Supervision/safety, Steadying (per clinical judgement)  Toileting Assistance with Device: Minimal  Toileting Deficit: Steadying, Clothing management up, Clothing management down, Perineal hygiene (per clinical judgement)  Activity Tolerance:  Endurance: Decreased tolerance for upright activites  Activity Tolerance Comments: fatigued  Bed Mobility/Transfers: Bed Mobility  Bed Mobility: Yes  Bed Mobility 1  Bed Mobility 1: Supine to sitting, Log roll  Level of Assistance 1: Moderate assistance  Bed Mobility Comments 1: education in log roll, assist for trunk up. head of bed elevated  Bed Mobility 2  Bed Mobility  2: Sitting to supine, Log roll  Level of Assistance 2: Minimum assistance  Bed Mobility Comments 2: education on log roll, assist to raise B LE    Transfers  Transfer: Yes  Transfer 1  Transfer From 1: Bed to  Transfer to 1: Stand  Technique 1: Sit to stand  Transfer Level of Assistance 1: Contact guard, Hand held assistance  Trials/Comments 1: patient then turns with hand held assist to be seated in the chair  Transfers 2  Transfer From 2: Chair with arms to  Transfer to 2: Stand  Technique 2: Sit to stand  Transfer Level of Assistance 2: Contact guard, Hand held assistance  Trials/Comments 2: patient then turns with hand held assist to be seated EOB    Sitting Balance:  Static Sitting Balance  Static Sitting-Balance Support: Feet supported, No upper extremity supported  Static Sitting-Level of Assistance: Close supervision    IADL's:   Homemaking Responsibilities: No  IADL Comments: family manages    Sensation:  Sensation Comment: patient reports neuropathy in her toes  Strength:  Strength Comments: B UE 3/5 grossly  Coordination:  Movements are  Fluid and Coordinated: Yes   Hand Function:  Hand Function  Gross Grasp: Functional  Coordination: Functional  Extremities: RUE   RUE : Exceptions to WFL (impaired at shoulders, chronic) and LUE   LUE: Exceptions to WFL (impaired at shoulders, chronic)    Outcome Measures: [facility] Daily Activity  Putting on and taking off regular lower body clothing: A lot  Bathing (including washing, rinsing, drying): A lot  Putting on and taking off regular upper body clothing: A little  Toileting, which includes using toilet, bedpan or urinal: A lot  Taking care of personal grooming such as brushing teeth: A little  Eating Meals: None  Daily Activity - Total Score: 16    Education Documentation  Body Mechanics, taught by Roberta Armijo OT at 9/19/2024  2:53 PM.  Learner: Patient  Readiness: Acceptance  Method: Explanation, Demonstration  Response: Verbalizes Understanding, Needs Reinforcement    Precautions, taught by Roberta Armijo OT at 9/19/2024  2:53 PM.  Learner: Patient  Readiness: Acceptance  Method: Explanation, Demonstration  Response: Verbalizes Understanding, Needs Reinforcement    ADL Training, taught by Roberta Armijo OT at 9/19/2024  2:53 PM.  Learner: Patient  Readiness: Acceptance  Method: Explanation, Demonstration  Response: Verbalizes Understanding, Needs Reinforcement    Education Comments  No comments found.      Goals:   Encounter Problems       Encounter Problems (Active)       OT Goals       ADLs (Progressing)       Start:  09/19/24    Expected End:  10/10/24       Patient will complete ADL tasks, with supervision, using AE need in order to increase patient's safety and independence with self-care tasks.          Functional Transfers (Progressing)       Start:  09/19/24    Expected End:  10/10/24       Patient will complete functional transfers with modified independence, using least restrictive device, in order to increase patient's safety and independence with daily tasks.          Functional Mobility  (Progressing)       Start:  09/19/24    Expected End:  10/10/24       Patient will demonstrate the ability to complete item retrieval and functional mobility with modified independence, in order to increase safety and independence with daily tasks.          Activity Tolerance  (Progressing)       Start:  09/19/24    Expected End:  10/10/24       Patient will demonstrate the ability to participate in functional activity at least >/= 25 minutes in order to increase patient's safety and independence with daily tasks.

## 2024-09-19 NOTE — PROGRESS NOTES
Physical Therapy    Physical Therapy Evaluation    Patient Name: Shoshana Thomas  MRN: [id number]  Department: [address]  Room: [facility]  Today's Date: 9/19/2024   Time Calculation  Start Time: 0953  Stop Time: 1012  Time Calculation (min): 19 min    Assessment/Plan   PT Assessment  PT Assessment Results: Decreased strength, Decreased endurance, Impaired balance, Decreased mobility, Decreased cognition, Decreased safety awareness, Obesity, Decreased skin integrity, Pain  Rehab Prognosis: Good  Barriers to Discharge: assist from family for bed mobility, CGA for transfers/gait  Evaluation/Treatment Tolerance: Patient tolerated treatment well  Medical Staff Made Aware: Yes  Strengths: Premorbid level of function  Barriers to Participation: Comorbidities  End of Session Communication: Bedside nurse  Assessment Comment: 89 year old female admis sp fall at home. On eval, she demonstrates impaired safe mobility warranting skilled PT care. Would benefit from low intensity rehab at DC.  End of Session Patient Position:  (In WC with transport team)  IP OR SWING BED PT PLAN  Inpatient or Swing Bed: Inpatient  PT Plan  Treatment/Interventions: Bed mobility, Transfer training, Gait training, Stair training, Balance training, Strengthening, Endurance training, Range of motion, Therapeutic exercise, Therapeutic activity, Home exercise program  PT Plan: Ongoing PT  PT Frequency: 4 times per week  PT Discharge Recommendations: Low intensity level of continued care  Equipment Recommended upon Discharge: Wheeled walker  PT Recommended Transfer Status: Contact guard, Assistive device  PT - OK to Discharge: Yes      Subjective   General Visit Information:  General  Reason for Referral: Impaired Mobility  Referred By: Dr. Buckley  Family/Caregiver Present: Yes  Caregiver Feedback: grandson in law  Prior to Session Communication: Bedside nurse, Care Coordinator (CC requested Pt to be seen in ED for DC recommendation needs)  Patient  Position Received: Bed, 4 rail up, Alarm off, not on at start of session (in ED bed)  Preferred Learning Style: verbal  General Comment: 89 year old female admits sp fall at home. All imaging negative at this time except chronic lumbar and thoracic spine fractures seen.Care coordination requested therapy see this patient in the ED for DC recomendations.  Home Living:  Home Living  Type of Home: House  Lives With: Grandchildren (Grand DTR+grand son in law)  Home Adaptive Equipment: Walker rolling or standard, Wheelchair-manual (Recliner/Lift Chair, Hospital bed)  Home Layout: Able to live on main level with bedroom/bathroom, Multi-level  Home Access: Stairs to enter with rails  Entrance Stairs-Rails: Both  Entrance Stairs-Number of Steps: 4  Bathroom Shower/Tub: Walk-in shower  Bathroom Equipment: Grab bars around toilet, Grab bars in shower, Shower chair with back  Prior Level of Function:  Prior Function Per Pt/Caregiver Report  Level of [place]: Independent with ADLs and functional transfers  Receives Help From: Family  ADL Assistance: Independent (dressing/bathing)  Homemaking Assistance: Needs assistance (done by family)  Ambulatory Assistance:  (with walker. This is her only fall/ lsat 6 months. Grand son in law present reports last fall was ~8 years ago. FWW at home. WC long Dx for appointments)  Precautions:  Precautions  Hearing/Visual Limitations: mild [place]  Medical Precautions: Fall precautions  Spine Precautions for pain management-chronc T/L Compression Fxs on imaging      Objective   Pain:  Pain Assessment  Pain Assessment: 0-10  0-10 (Numeric) Pain Score: 10 - Worst possible pain  Pain Type: Acute pain  Pain Location:  (Tailbone--says she normally cant sit on hard chairs at home because it hurts too much)  Pain Interventions: Ambulation/increased activity  Cognition:  Cognition  Overall Cognitive Status: Impaired  Orientation Level: Oriented X4  Following Commands: Follows all commands and  directions without difficulty  Cognition Comments: Easily distractable, difficulty focusing to task at hand  Memory: Within Funtional Limits  Insight: Mild  Impulsive: Mildly    General Assessments:  Activity Tolerance  Endurance: Decreased tolerance for upright activites    Sensation  Light Touch:  (chronic B toe numbness but otherwise no change)    Strength  Strength Comments: Grossly 4/5 BLEs on MMT  Functional Assessments:  Bed Mobility  Bed Mobility: Yes  Bed Mobility 1  Bed Mobility 1: Supine to sitting, Rolling left, Log roll  Level of Assistance 1: Moderate assistance  Bed Mobility Comments 1: Log roll used due to compression Fxs for pain control. x2 trials of this today for practice. assist for trunk and bles. Cues on technique    Transfers  Transfer: Yes  Transfer 1  Transfer From 1: Bed to  Transfer to 1: Stand  Technique 1: Sit to stand, Stand to sit  Transfer Device 1: Walker  Transfer Level of Assistance 1: Contact guard  Trials/Comments 1: CGA for safety due to pain of sitting on tailbone at EOB. Does pull from AD.  Transfers 2  Transfer From 2: Wheelchair to  Transfer to 2: Stand  Technique 2: Sit to stand, Stand to sit  Transfer Device 2: Walker  Transfer Level of Assistance 2: Contact guard  Trials/Comments 2: BUE pushoff from WC. Slow but self completed. CGA for safety    Ambulation/Gait Training  Ambulation/Gait Training Performed: Yes  Ambulation/Gait Training 1  Surface 1: Level tile  Device 1: Rolling walker  Assistance 1: Contact guard  Quality of Gait 1:  (slow, laborous, shuffled, and antalgic B. Limited in Dx only because transport team arrives to take Pt at this time)  Comments/Distance (ft) 1: 30'    Outcome Measures:  [facility] Basic Mobility  Turning from your back to your side while in a flat bed without using bedrails: A lot  Moving from lying on your back to sitting on the side of a flat bed without using bedrails: A lot  Moving to and from bed to chair (including a wheelchair): A  little  Standing up from a chair using your arms (e.g. wheelchair or bedside chair): A little  To walk in hospital room: A little  Climbing 3-5 steps with railing: A little  Basic Mobility - Total Score: 16    Encounter Problems       Encounter Problems (Active)       Balance       Standing Balance (Progressing)       Start:  09/19/24    Expected End:  10/03/24       Pt will demonstrate good static standing balance to promote safe participation with out of bed activity, transfers, and mobility              Mobility       Ambulation (Progressing)       Start:  09/19/24    Expected End:  10/03/24       Pt will ambulate 50' modified independent assist with walker to promote safe home mobility           Entry Stair Negotiation (Progressing)       Start:  09/19/24    Expected End:  10/03/24       Pt will ascend/descend 4 stairs with rail(s) on B and modified independent assist to promote safe entry and exit in home environment                PT Transfers       Supine to sit (Progressing)       Start:  09/19/24    Expected End:  10/03/24       Pt will transfer supine to sitting at edge of bed with modified independent assist to promote acute care out of bed activity           Sit to stand (Progressing)       Start:  09/19/24    Expected End:  10/03/24       Pt will transfer sit to standing position with modified independent assist and walker to promote safe out of bed activity              Safety       Safe Mobility Techniques (Progressing)       Start:  09/19/24    Expected End:  10/03/24       Pt will correctly identify and demonstrate safe mobility techniques to reduce their risks for falls during their acute care stay            Spine Precautions for pain control (Progressing)       Start:  09/19/24    Expected End:  10/03/24       Pt will be independent with both understanding and demonstration of post op spine restrictions to promote safe functional mobility at discharge                  Education  Documentation  Mobility Training, taught by Monroe Ann, PT at 9/19/2024 10:49 AM.  Learner: Family, Patient  Readiness: Acceptance  Method: Explanation, Demonstration  Response: Verbalizes Understanding, Needs Reinforcement  Comment: safe mobility techniques, fall risk management    Education Comments  No comments found.

## 2024-09-19 NOTE — PROGRESS NOTES
09/19/24 1057   Discharge Planning   Living Arrangements Family members   Support Systems Family members   Assistance Needed Patient is independent of ADLs, but has help from granddaughter, and grandson-in-law if needed. They also provide for IADLs. Grandson-in-law, Pantera, confirmed the same. He reports he was present for PT evaluation and confirmed they can manage/care for the patient at home with current functional level.   Type of Residence Private residence   Do you have animals or pets at home? No   Who is requesting discharge planning? Provider   Home or Post Acute Services None   Expected Discharge Disposition Home H   Does the patient need discharge transport arranged? No   Financial Resource Strain   How hard is it for you to pay for the very basics like food, housing, medical care, and heating? Not hard   Housing Stability   In the last 12 months, was there a time when you were not able to pay the mortgage or rent on time? N   In the past 12 months, how many times have you moved where you were living? 0   At any time in the past 12 months, were you homeless or living in a shelter (including now)? N   Transportation Needs   In the past 12 months, has lack of transportation kept you from medical appointments or from getting medications? no   In the past 12 months, has lack of transportation kept you from meetings, work, or from getting things needed for daily living? No   Patient Choice   Provider Choice list and CMS website (https://medicare.gov/care-compare#search) for post-acute Quality and Resource Measure Data were provided and reviewed with: Patient;Family   Patient / Family choosing to utilize agency / facility established prior to hospitalization No     MSW met with patient and grandson-in-law Pantera. Patient lives with toni and Pantera at home. They provide 24/7 care. Assist with ADLs as needed, and provide for IADLs. Pantera confirmed they can provide patient's care at this time and both  are agreeable to home care. MSW did speak with PT and recommendation is for low intensity. Patient's PCP is Dr. Fraser, a  provider, and as such would like to utilize  Home Care. Dr. Buckley updated to the above.     Patient can return home and will need an internal home care referral upon discharge.

## 2024-09-19 NOTE — H&P
History Of Present Illness  Shoshana Thomas is a 89 y.o. female presenting with fall and neck and shoulder pain.  She presented to the [facility] emergency department earlier today because of pain following a fall.  At about 9:30 PM yesterday, she was trying to retrieve a package when she fell backwards, landed on her bottom and hit the back of her head.  She was unable to get up and called for help.  Eventually, her granddaughter's  heard her calling and came down from upstairs to help her up.  She was able to get up with assistance and she went to bed.  This morning, she continues to have pain and she presented to the ED.  She had extensive imaging studies done.  A CT scan of the head and cervical spine were unremarkable.  CT scans of the thoracic spine and lumbar spine showed fractures of T9, T10 and L2 which appeared old.  The ED, she was unable to walk and so she was admitted.     Past Medical History  Past Medical History:   Diagnosis Date    Adrenal mass (Multi)     At risk for bleeding 09/07/2023    Atherosclerotic heart disease of native coronary artery without angina pectoris 09/07/2023    Cataract     Chronic atrial fibrillation (Multi) 09/07/2023    Chronic diastolic heart failure (Multi)     Glaucoma     History of ischemic stroke 09/07/2023    Hyperlipidemia     Hypertension     Iron deficiency anemia     Mixed hyperlipidemia 09/07/2023    Syncope and collapse 09/07/2023    Type 2 diabetes mellitus (Multi)     Vitamin B12 deficiency        Surgical History  Past Surgical History:   Procedure Laterality Date    MR HEAD ANGIO WO IV CONTRAST  06/14/2016    MR HEAD ANGIO WO IV CONTRAST [facility]        Social History  She reports that she has never smoked. She has never been exposed to tobacco smoke. She has never used smokeless tobacco. She reports current alcohol use. She reports that she does not use drugs.    Family History  Family History   Problem Relation Name Age of Onset    Other  "(VAD) Mother      Diabetes Father      Other (Sepsis) Father      Other (DJD) Sister      Diabetes Sister      Factor V Leiden deficiency Sister      Other (CKD) Brother      Heart disease Other      Hypertension Other      Cancer Other      Stroke Other          Allergies  Patient has no known allergies.    Review of Systems   General: Negative for fever,  chills or fatigue.    HEENT: Negative for headache, blurring of vision or double vision.    Cardiovascular: Negative for chest pain, palpitations or orthopnea.    Respiratory: Negative for cough, shortness of breath or wheezing.    Gastrointestinal: Negative for nausea, vomiting, hematemesis, abdominal pain or diarrhea.   Genitourinary: Negative for dysuria, hematuria, frequency or nocturia.   Musculoskeletal: As in the HPI   Skin: Negative for rash, itching, or jaundice.   Hematologic: Negative for bleeding or bruising.   Neurologic: Negative for headache, loss of consciousness. syncope or seizures     Physical Exam   General.: Awake alert well-developed, responsive   HEENT: Normocephalic, not icteric, not pale, no facial asymmetry, no pharyngeal erythema.   Neck: Supple, no carotid bruit, no thyroid enlargement.   Cardiovascular: Regular heart rate and rhythm normal S1 and S2.   Respiratory: Equal breath sounds bilaterally clear to auscultation.   Abdomen: Soft, nontender to palpation, bowel sounds present and normoactive.   Extremities: No peripheral cyanosis, no pedal edema.   Neurologic: Alert and oriented to self, place and date, muscle strength 5/5 in all extremities.   Dermatologic: No rash, ecchymosis, or jaundice.   Psychological: Appropriate affect and behavior.        Last Recorded Vitals  Blood pressure 161/79, pulse 66, temperature 36.3 °C (97.3 °F), temperature source Oral, resp. rate 19, height 1.602 m (5' 3.07\"), weight 90.3 kg (199 lb 1.2 oz), SpO2 97%.    Relevant Result  Results for orders placed or performed during the hospital encounter of " 09/19/24 (from the past 24 hour(s))   ECG 12 lead   Result Value Ref Range    Ventricular Rate 64 BPM    Atrial Rate 178 BPM    QRS Duration 80 ms    QT Interval 434 ms    QTC Calculation(Bazett) 447 ms    R Axis -2 degrees    T Axis 14 degrees    QRS Count 10 beats    Q Onset 226 ms    T Offset 443 ms    QTC Fredericia 443 ms   CBC and Auto Differential   Result Value Ref Range    WBC 9.2 4.4 - 11.3 x10*3/uL    nRBC 0.0 0.0 - 0.0 /100 WBCs    RBC 3.98 (L) 4.00 - 5.20 x10*6/uL    Hemoglobin 13.0 12.0 - 16.0 g/dL    Hematocrit 39.1 36.0 - 46.0 %    MCV 98 80 - 100 fL    MCH 32.7 26.0 - 34.0 pg    MCHC 33.2 32.0 - 36.0 g/dL    RDW 14.0 11.5 - 14.5 %    Platelets 211 150 - 450 x10*3/uL    Neutrophils % 54.9 40.0 - 80.0 %    Immature Granulocytes %, Automated 0.3 0.0 - 0.9 %    Lymphocytes % 34.3 13.0 - 44.0 %    Monocytes % 8.4 2.0 - 10.0 %    Eosinophils % 1.4 0.0 - 6.0 %    Basophils % 0.7 0.0 - 2.0 %    Neutrophils Absolute 5.03 1.60 - 5.50 x10*3/uL    Immature Granulocytes Absolute, Automated 0.03 0.00 - 0.50 x10*3/uL    Lymphocytes Absolute 3.14 (H) 0.80 - 3.00 x10*3/uL    Monocytes Absolute 0.77 0.05 - 0.80 x10*3/uL    Eosinophils Absolute 0.13 0.00 - 0.40 x10*3/uL    Basophils Absolute 0.06 0.00 - 0.10 x10*3/uL   Magnesium   Result Value Ref Range    Magnesium 1.86 1.60 - 2.40 mg/dL   Comprehensive metabolic panel   Result Value Ref Range    Glucose 155 (H) 74 - 99 mg/dL    Sodium 143 136 - 145 mmol/L    Potassium 3.3 (L) 3.5 - 5.3 mmol/L    Chloride 102 98 - 107 mmol/L    Bicarbonate 30 21 - 32 mmol/L    Anion Gap 14 10 - 20 mmol/L    Urea Nitrogen 21 6 - 23 mg/dL    Creatinine 1.61 (H) 0.50 - 1.05 mg/dL    eGFR 30 (L) >60 mL/min/1.73m*2    Calcium 9.5 8.6 - 10.3 mg/dL    Albumin 3.5 3.4 - 5.0 g/dL    Alkaline Phosphatase 72 33 - 136 U/L    Total Protein 7.5 6.4 - 8.2 g/dL    AST 18 9 - 39 U/L    Bilirubin, Total 0.8 0.0 - 1.2 mg/dL    ALT 14 7 - 45 U/L   Blood Gas Venous Full Panel   Result Value Ref Range     POCT pH, Venous 7.62 (HH) 7.33 - 7.43 pH    POCT pCO2, Venous 36 (L) 41 - 51 mm Hg    POCT pO2, Venous 37 35 - 45 mm Hg    POCT SO2, Venous 43 (L) 45 - 75 %    POCT Oxy Hemoglobin, Venous 42.7 (L) 45.0 - 75.0 %    POCT Hematocrit Calculated, Venous 47.0 (H) 36.0 - 46.0 %    POCT Sodium, Venous 141 136 - 145 mmol/L    POCT Potassium, Venous 3.3 (L) 3.5 - 5.3 mmol/L    POCT Chloride, Venous 103 98 - 107 mmol/L    POCT Ionized Calicum, Venous 1.19 1.10 - 1.33 mmol/L    POCT Glucose, Venous 172 (H) 74 - 99 mg/dL    POCT Lactate, Venous 2.4 (H) 0.4 - 2.0 mmol/L    POCT Base Excess, Venous 14.7 (H) -2.0 - 3.0 mmol/L    POCT HCO3 Calculated, Venous 37.0 (H) 22.0 - 26.0 mmol/L    POCT Hemoglobin, Venous 15.6 12.0 - 16.0 g/dL    POCT Anion Gap, Venous 4.0 (L) 10.0 - 25.0 mmol/L    Patient Temperature 37.0 degrees Celsius    FiO2 0 %   B-Type Natriuretic Peptide   Result Value Ref Range    BNP 98 0 - 99 pg/mL   Troponin I, High Sensitivity, Initial   Result Value Ref Range    Troponin I, High Sensitivity 23 (H) 0 - 13 ng/L   Protime-INR   Result Value Ref Range    Protime 15.6 (H) 9.3 - 12.7 seconds    INR 1.5 (H) 0.9 - 1.2   APTT   Result Value Ref Range    aPTT 27.7 22.0 - 32.5 seconds   Troponin, High Sensitivity, 1 Hour   Result Value Ref Range    Troponin I, High Sensitivity 21 (H) 0 - 13 ng/L   Blood Gas Lactic Acid, Venous   Result Value Ref Range    POCT Lactate, Venous 1.9 0.4 - 2.0 mmol/L   POCT GLUCOSE   Result Value Ref Range    POCT Glucose 113 (H) 74 - 99 mg/dL     ECG 12 lead    Result Date: 9/19/2024  Atrial fibrillation Anteroseptal infarct , age undetermined Abnormal ECG No previous ECGs available Confirmed by Aditya Wright ([id number]) on 9/19/2024 12:32:54 PM    XR shoulder left 2+ views    Result Date: 9/19/2024  Interpreted By:  Reinier Mendoza, STUDY: XR SHOULDER LEFT 2+ VIEWS; ;  9/19/2024 6:10 am   INDICATION: Signs/Symptoms:fall.     COMPARISON: None.   ACCESSION NUMBER(S): [id number]   ORDERING  CLINICIAN: SHANNEN DUKE   FINDINGS: Severe arthritic degeneration glenohumeral joint with remodeling of the humeral head. No acute fracture or dislocation.       As above.     MACRO: None   Signed by: Reinier Mendoza 9/19/2024 6:58 AM Dictation workstation:   [id number]    CT lumbar spine wo IV contrast    Result Date: 9/19/2024  Interpreted By:  Reinier Mendoza, STUDY: CT CHEST ABDOMEN PELVIS W IV CONTRAST; CT THORACIC SPINE WO IV CONTRAST; CT LUMBAR SPINE WO IV CONTRAST;  9/19/2024 6:11 am; 9/19/2024 6:19 am   INDICATION: Signs/Symptoms:lowerl thoracic ttp 2/t fall backwards; Signs/Symptoms:fall on coumadin; Signs/Symptoms:fall backwards, lower thoracic tenderness.   COMPARISON: None.   ACCESSION NUMBER(S): [id number]; [id number]; [id number]   ORDERING CLINICIAN: SHANNEN DUKE   TECHNIQUE: Contiguous axial images of the chest, abdomen, and pelvis were obtained after the intravenous administration of iodinated contrast. Coronal and sagittal reformatted images were reconstructed from the axial data.   Additional CT imaging of the thoracic and lumbar spine was performed with coronal and sagittal reformatted images that were created and reviewed.   FINDINGS: CT CHEST:   Streak artifact from the arms which were not raised.   Heart size within normal limits.   No adenopathy in the chest.   No consolidation in the lungs. No pneumothorax. Mild emphysema.   Severe degenerative changes bilateral shoulders. No displaced rib fracture.     CT ABDOMEN/PELVIS:   Liver, adrenals, spleen unremarkable. Gallbladder is absent.   Diffuse fatty infiltration pancreas.   Kidneys intact without hydronephrosis. Bladder within normal limits.   No bowel obstruction. No colitis. Moderate diverticulosis.   Moderate to severe calcification distal aorta without aneurysm.   No free fluid or adenopathy.     THORACIC SPINE:   Alignment within normal limits.   Moderate to severe compression deformity seen T9 and T10. these have a somewhat  sclerotic appearance and may reflect chronic fractures. No comparison study.   Multilevel moderate to advanced degenerative disc disease thoracic spine. There are flowing anterior osteophytes.   LUMBAR SPINE:   There is marked osteopenia.   There is moderate to severe compression deformity of L2 which has a sclerotic appearance, suspect this is a chronic fracture. There is moderate retropulsion/central canal stenosis.   No other acute fracture suspected.       Compression fractures seen involving the lower thoracic and lumbar spine as above. These have a somewhat sclerotic appearance. No comparison study. Fractures may be chronic. Correlate with patient's symptoms. Consider MR imaging for further evaluation if clinically indicated.   No other evidence of significant sequelae of acute trauma seen in the chest, abdomen or pelvis.   Additional nonemergent/senescent findings as above.   MACRO: None.   Signed by: Reinier Mendoza 9/19/2024 6:55 AM Dictation workstation:   [id number]    CT chest abdomen pelvis w IV contrast    Result Date: 9/19/2024  Interpreted By:  Reinier Mendoza, STUDY: CT CHEST ABDOMEN PELVIS W IV CONTRAST; CT THORACIC SPINE WO IV CONTRAST; CT LUMBAR SPINE WO IV CONTRAST;  9/19/2024 6:11 am; 9/19/2024 6:19 am   INDICATION: Signs/Symptoms:lowerl thoracic ttp 2/t fall backwards; Signs/Symptoms:fall on coumadin; Signs/Symptoms:fall backwards, lower thoracic tenderness.   COMPARISON: None.   ACCESSION NUMBER(S): [id number]; [id number]; [id number]   ORDERING CLINICIAN: SHANNEN DUKE   TECHNIQUE: Contiguous axial images of the chest, abdomen, and pelvis were obtained after the intravenous administration of iodinated contrast. Coronal and sagittal reformatted images were reconstructed from the axial data.   Additional CT imaging of the thoracic and lumbar spine was performed with coronal and sagittal reformatted images that were created and reviewed.   FINDINGS: CT CHEST:   Streak artifact from the  arms which were not raised.   Heart size within normal limits.   No adenopathy in the chest.   No consolidation in the lungs. No pneumothorax. Mild emphysema.   Severe degenerative changes bilateral shoulders. No displaced rib fracture.     CT ABDOMEN/PELVIS:   Liver, adrenals, spleen unremarkable. Gallbladder is absent.   Diffuse fatty infiltration pancreas.   Kidneys intact without hydronephrosis. Bladder within normal limits.   No bowel obstruction. No colitis. Moderate diverticulosis.   Moderate to severe calcification distal aorta without aneurysm.   No free fluid or adenopathy.     THORACIC SPINE:   Alignment within normal limits.   Moderate to severe compression deformity seen T9 and T10. these have a somewhat sclerotic appearance and may reflect chronic fractures. No comparison study.   Multilevel moderate to advanced degenerative disc disease thoracic spine. There are flowing anterior osteophytes.   LUMBAR SPINE:   There is marked osteopenia.   There is moderate to severe compression deformity of L2 which has a sclerotic appearance, suspect this is a chronic fracture. There is moderate retropulsion/central canal stenosis.   No other acute fracture suspected.       Compression fractures seen involving the lower thoracic and lumbar spine as above. These have a somewhat sclerotic appearance. No comparison study. Fractures may be chronic. Correlate with patient's symptoms. Consider MR imaging for further evaluation if clinically indicated.   No other evidence of significant sequelae of acute trauma seen in the chest, abdomen or pelvis.   Additional nonemergent/senescent findings as above.   MACRO: None.   Signed by: Reinier Mendoza 9/19/2024 6:55 AM Dictation workstation:   [id number]    CT thoracic spine wo IV contrast    Result Date: 9/19/2024  Interpreted By:  Reinier Mendoza, STUDY: CT CHEST ABDOMEN PELVIS W IV CONTRAST; CT THORACIC SPINE WO IV CONTRAST; CT LUMBAR SPINE WO IV CONTRAST;  9/19/2024 6:11 am;  9/19/2024 6:19 am   INDICATION: Signs/Symptoms:lowerl thoracic ttp 2/t fall backwards; Signs/Symptoms:fall on coumadin; Signs/Symptoms:fall backwards, lower thoracic tenderness.   COMPARISON: None.   ACCESSION NUMBER(S): [id number]; [id number]; [id number]   ORDERING CLINICIAN: SHANNEN DUKE   TECHNIQUE: Contiguous axial images of the chest, abdomen, and pelvis were obtained after the intravenous administration of iodinated contrast. Coronal and sagittal reformatted images were reconstructed from the axial data.   Additional CT imaging of the thoracic and lumbar spine was performed with coronal and sagittal reformatted images that were created and reviewed.   FINDINGS: CT CHEST:   Streak artifact from the arms which were not raised.   Heart size within normal limits.   No adenopathy in the chest.   No consolidation in the lungs. No pneumothorax. Mild emphysema.   Severe degenerative changes bilateral shoulders. No displaced rib fracture.     CT ABDOMEN/PELVIS:   Liver, adrenals, spleen unremarkable. Gallbladder is absent.   Diffuse fatty infiltration pancreas.   Kidneys intact without hydronephrosis. Bladder within normal limits.   No bowel obstruction. No colitis. Moderate diverticulosis.   Moderate to severe calcification distal aorta without aneurysm.   No free fluid or adenopathy.     THORACIC SPINE:   Alignment within normal limits.   Moderate to severe compression deformity seen T9 and T10. these have a somewhat sclerotic appearance and may reflect chronic fractures. No comparison study.   Multilevel moderate to advanced degenerative disc disease thoracic spine. There are flowing anterior osteophytes.   LUMBAR SPINE:   There is marked osteopenia.   There is moderate to severe compression deformity of L2 which has a sclerotic appearance, suspect this is a chronic fracture. There is moderate retropulsion/central canal stenosis.   No other acute fracture suspected.       Compression fractures seen  involving the lower thoracic and lumbar spine as above. These have a somewhat sclerotic appearance. No comparison study. Fractures may be chronic. Correlate with patient's symptoms. Consider MR imaging for further evaluation if clinically indicated.   No other evidence of significant sequelae of acute trauma seen in the chest, abdomen or pelvis.   Additional nonemergent/senescent findings as above.   MACRO: None.   Signed by: Reinier Mendoza 9/19/2024 6:55 AM Dictation workstation:   [id number]    CT head wo IV contrast    Result Date: 9/19/2024  Interpreted By:  Reinier Mendoza, STUDY: CT HEAD WO IV CONTRAST; CT CERVICAL SPINE WO IV CONTRAST;  9/19/2024 6:08 am   INDICATION: Signs/Symptoms:fall on coumadin.   COMPARISON: None.   ACCESSION NUMBER(S): [id number]; [id number]   ORDERING CLINICIAN: SHANNEN DUKE   TECHNIQUE: Noncontrast CT images of head. Axial noncontrast CT images of the cervical spine with coronal and sagittal reconstructed images.   FINDINGS: BRAIN PARENCHYMA: Diffuse cortical atrophy seen. Moderate white matter chronic small-vessel ischemic change. Gray-white matter interfaces are  preserved. No mass effect or midline shift.   HEMORRHAGE: No acute intracranial hemorrhage. VENTRICLES and EXTRA-AXIAL SPACES: The ventricles and sulci are within normal limits in size for brain volume. No abnormal extraaxial fluid collection. EXTRACRANIAL SOFT TISSUES: Within normal limits. PARANASAL SINUSES/MASTOIDS:  The visualized paranasal sinuses and mastoid air cells are aerated. CALVARIUM: No depressed skull fracture. No destructive osseous lesion.   OTHER FINDINGS: None.   CERVICAL SPINE:   ALIGNMENT: Normal. VERTEBRAE: No acute fracture. SPINAL CANAL: Multilevel moderate degenerative disc disease and facet arthropathy. No critical spinal canal stenosis. PREVERTEBRAL SOFT TISSUES: No prevertebral soft tissue swelling. LUNG APICES: Imaged portion of the lung apices are within normal limits.   OTHER FINDINGS:  None.       No acute intracranial abnormality.   No acute fracture or traumatic subluxation of the cervical spine.       MACRO: None   Signed by: Reinier Mendoza 9/19/2024 6:43 AM Dictation workstation:   [id number]    CT cervical spine wo IV contrast    Result Date: 9/19/2024  Interpreted By:  Reinier Mendoza, STUDY: CT HEAD WO IV CONTRAST; CT CERVICAL SPINE WO IV CONTRAST;  9/19/2024 6:08 am   INDICATION: Signs/Symptoms:fall on coumadin.   COMPARISON: None.   ACCESSION NUMBER(S): [id number]; [id number]   ORDERING CLINICIAN: SHANNEN DUKE   TECHNIQUE: Noncontrast CT images of head. Axial noncontrast CT images of the cervical spine with coronal and sagittal reconstructed images.   FINDINGS: BRAIN PARENCHYMA: Diffuse cortical atrophy seen. Moderate white matter chronic small-vessel ischemic change. Gray-white matter interfaces are  preserved. No mass effect or midline shift.   HEMORRHAGE: No acute intracranial hemorrhage. VENTRICLES and EXTRA-AXIAL SPACES: The ventricles and sulci are within normal limits in size for brain volume. No abnormal extraaxial fluid collection. EXTRACRANIAL SOFT TISSUES: Within normal limits. PARANASAL SINUSES/MASTOIDS:  The visualized paranasal sinuses and mastoid air cells are aerated. CALVARIUM: No depressed skull fracture. No destructive osseous lesion.   OTHER FINDINGS: None.   CERVICAL SPINE:   ALIGNMENT: Normal. VERTEBRAE: No acute fracture. SPINAL CANAL: Multilevel moderate degenerative disc disease and facet arthropathy. No critical spinal canal stenosis. PREVERTEBRAL SOFT TISSUES: No prevertebral soft tissue swelling. LUNG APICES: Imaged portion of the lung apices are within normal limits.   OTHER FINDINGS: None.       No acute intracranial abnormality.   No acute fracture or traumatic subluxation of the cervical spine.       MACRO: None   Signed by: Reinier Mendoza 9/19/2024 6:43 AM Dictation workstation:   [id number]        Assessment/Plan   Assessment &  Plan    Fall  Mechanical fall with neck and shoulder pain  Improved  Pain medication as needed    Chronic kidney disease  Appears to be at baseline    Chronic atrial fibrillation  Heart rate is controlled  On long-term anticoagulation with Coumadin    Chronic diastolic heart failure  Stable    Hypokalemia  Continue oral potassium chloride    Type 2 diabetes mellitus with hyperglycemia  Continue Lantus  Humalog sliding scale  Hemoglobin A1c was 7.2% on 6/26    Plan  PT/OT  She follows up with an anticoagulation clinic.  She states she was taking Coumadin 6 mg every day except Fridays and 3 mg on Fridays.  She stated that last week, she had a supratherapeutic INR of 4.5 she was asked not to take Coumadin for a few days and then take 3 mg doses.  Will continue Coumadin 3 mg daily, monitor INR.    Martínez Buckley MD

## 2024-09-19 NOTE — ED PROVIDER NOTES
Patient received in sign out from Dr Nesbitt. Patient with mechanical fall, pending imaging and laboratory results.  Laboratory studies unremarkable except for alkalosis seen on VBG, however electrolytes are unremarkable on BMP including bicarbonate.  Imaging studies without acute findings.  Patient was unable to ambulate in the emergency department.  In the setting, patient accepted to hospitalist service for further management.  Parts of this chart were completed with dictation software, please excuse any errors in transcription.     Reinier Calle MD  09/19/24 [id number]

## 2024-09-20 ENCOUNTER — APPOINTMENT (OUTPATIENT)
Dept: CARDIOLOGY | Facility: HOSPITAL | Age: 89
DRG: 552 | End: 2024-09-20
Payer: MEDICARE

## 2024-09-20 ENCOUNTER — APPOINTMENT (OUTPATIENT)
Dept: RADIOLOGY | Facility: HOSPITAL | Age: 89
DRG: 552 | End: 2024-09-20
Payer: MEDICARE

## 2024-09-20 LAB
ANION GAP SERPL CALCULATED.3IONS-SCNC: 11 MMOL/L (ref 10–20)
APPEARANCE UR: CLEAR
BILIRUB UR STRIP.AUTO-MCNC: NEGATIVE MG/DL
BUN SERPL-MCNC: 23 MG/DL (ref 6–23)
CALCIUM SERPL-MCNC: 8.5 MG/DL (ref 8.6–10.3)
CHLORIDE SERPL-SCNC: 104 MMOL/L (ref 98–107)
CO2 SERPL-SCNC: 30 MMOL/L (ref 21–32)
COLOR UR: YELLOW
CREAT SERPL-MCNC: 1.77 MG/DL (ref 0.5–1.05)
EGFRCR SERPLBLD CKD-EPI 2021: 27 ML/MIN/1.73M*2
ERYTHROCYTE [DISTWIDTH] IN BLOOD BY AUTOMATED COUNT: 14.3 % (ref 11.5–14.5)
GLUCOSE BLD MANUAL STRIP-MCNC: 125 MG/DL (ref 74–99)
GLUCOSE BLD MANUAL STRIP-MCNC: 181 MG/DL (ref 74–99)
GLUCOSE BLD MANUAL STRIP-MCNC: 190 MG/DL (ref 74–99)
GLUCOSE BLD MANUAL STRIP-MCNC: 242 MG/DL (ref 74–99)
GLUCOSE SERPL-MCNC: 136 MG/DL (ref 74–99)
GLUCOSE UR STRIP.AUTO-MCNC: NORMAL MG/DL
HCT VFR BLD AUTO: 34.3 % (ref 36–46)
HGB BLD-MCNC: 11.1 G/DL (ref 12–16)
INR PPP: 1.5 (ref 0.9–1.2)
KETONES UR STRIP.AUTO-MCNC: NEGATIVE MG/DL
LEUKOCYTE ESTERASE UR QL STRIP.AUTO: NEGATIVE
MCH RBC QN AUTO: 32.5 PG (ref 26–34)
MCHC RBC AUTO-ENTMCNC: 32.4 G/DL (ref 32–36)
MCV RBC AUTO: 100 FL (ref 80–100)
NITRITE UR QL STRIP.AUTO: NEGATIVE
NRBC BLD-RTO: 0 /100 WBCS (ref 0–0)
PH UR STRIP.AUTO: 5.5 [PH]
PLATELET # BLD AUTO: 185 X10*3/UL (ref 150–450)
POTASSIUM SERPL-SCNC: 3.3 MMOL/L (ref 3.5–5.3)
PROT UR STRIP.AUTO-MCNC: NEGATIVE MG/DL
PROTHROMBIN TIME: 15.3 SECONDS (ref 9.3–12.7)
RBC # BLD AUTO: 3.42 X10*6/UL (ref 4–5.2)
RBC # UR STRIP.AUTO: NEGATIVE /UL
SODIUM SERPL-SCNC: 142 MMOL/L (ref 136–145)
SP GR UR STRIP.AUTO: 1.03
UROBILINOGEN UR STRIP.AUTO-MCNC: NORMAL MG/DL
WBC # BLD AUTO: 8.7 X10*3/UL (ref 4.4–11.3)

## 2024-09-20 PROCEDURE — 85027 COMPLETE CBC AUTOMATED: CPT | Performed by: INTERNAL MEDICINE

## 2024-09-20 PROCEDURE — 97530 THERAPEUTIC ACTIVITIES: CPT | Mod: GO

## 2024-09-20 PROCEDURE — 97116 GAIT TRAINING THERAPY: CPT | Mod: GP

## 2024-09-20 PROCEDURE — 72220 X-RAY EXAM SACRUM TAILBONE: CPT | Performed by: RADIOLOGY

## 2024-09-20 PROCEDURE — 36415 COLL VENOUS BLD VENIPUNCTURE: CPT | Performed by: INTERNAL MEDICINE

## 2024-09-20 PROCEDURE — 80048 BASIC METABOLIC PNL TOTAL CA: CPT | Performed by: INTERNAL MEDICINE

## 2024-09-20 PROCEDURE — 97530 THERAPEUTIC ACTIVITIES: CPT | Mod: GP

## 2024-09-20 PROCEDURE — 2500000002 HC RX 250 W HCPCS SELF ADMINISTERED DRUGS (ALT 637 FOR MEDICARE OP, ALT 636 FOR OP/ED): Performed by: INTERNAL MEDICINE

## 2024-09-20 PROCEDURE — 81003 URINALYSIS AUTO W/O SCOPE: CPT | Performed by: STUDENT IN AN ORGANIZED HEALTH CARE EDUCATION/TRAINING PROGRAM

## 2024-09-20 PROCEDURE — G0378 HOSPITAL OBSERVATION PER HR: HCPCS

## 2024-09-20 PROCEDURE — 2500000001 HC RX 250 WO HCPCS SELF ADMINISTERED DRUGS (ALT 637 FOR MEDICARE OP): Performed by: INTERNAL MEDICINE

## 2024-09-20 PROCEDURE — 99232 SBSQ HOSP IP/OBS MODERATE 35: CPT | Performed by: INTERNAL MEDICINE

## 2024-09-20 PROCEDURE — 97535 SELF CARE MNGMENT TRAINING: CPT | Mod: GO

## 2024-09-20 PROCEDURE — 93005 ELECTROCARDIOGRAM TRACING: CPT

## 2024-09-20 PROCEDURE — 85610 PROTHROMBIN TIME: CPT | Performed by: INTERNAL MEDICINE

## 2024-09-20 PROCEDURE — 72220 X-RAY EXAM SACRUM TAILBONE: CPT

## 2024-09-20 PROCEDURE — 82947 ASSAY GLUCOSE BLOOD QUANT: CPT

## 2024-09-20 RX ORDER — POTASSIUM CHLORIDE 1.5 G/1.58G
40 POWDER, FOR SOLUTION ORAL ONCE
Status: COMPLETED | OUTPATIENT
Start: 2024-09-20 | End: 2024-09-20

## 2024-09-20 RX ADMIN — Medication 38 MG OF ELEMENTAL IRON: at 08:56

## 2024-09-20 RX ADMIN — INSULIN HUMAN 2 UNITS: 100 INJECTION, SOLUTION PARENTERAL at 17:04

## 2024-09-20 RX ADMIN — PANTOPRAZOLE SODIUM 40 MG: 40 TABLET, DELAYED RELEASE ORAL at 05:47

## 2024-09-20 RX ADMIN — HYDRALAZINE HYDROCHLORIDE 50 MG: 50 TABLET ORAL at 20:08

## 2024-09-20 RX ADMIN — POTASSIUM CHLORIDE 10 MEQ: 750 TABLET, EXTENDED RELEASE ORAL at 11:56

## 2024-09-20 RX ADMIN — WARFARIN SODIUM 3 MG: 3 TABLET ORAL at 17:01

## 2024-09-20 RX ADMIN — ATORVASTATIN CALCIUM 20 MG: 20 TABLET, FILM COATED ORAL at 08:55

## 2024-09-20 RX ADMIN — TORSEMIDE 50 MG: 100 TABLET ORAL at 17:01

## 2024-09-20 RX ADMIN — HYDRALAZINE HYDROCHLORIDE 50 MG: 50 TABLET ORAL at 08:56

## 2024-09-20 RX ADMIN — ISOSORBIDE MONONITRATE 60 MG: 60 TABLET, EXTENDED RELEASE ORAL at 08:56

## 2024-09-20 RX ADMIN — POTASSIUM CHLORIDE 10 MEQ: 750 TABLET, EXTENDED RELEASE ORAL at 17:01

## 2024-09-20 RX ADMIN — ACETAMINOPHEN 650 MG: 325 TABLET ORAL at 08:59

## 2024-09-20 RX ADMIN — CALCITRIOL CAPSULES 0.25 MCG 0.25 MCG: 0.25 CAPSULE ORAL at 08:55

## 2024-09-20 RX ADMIN — POTASSIUM CHLORIDE 40 MEQ: 1.5 FOR SOLUTION ORAL at 08:57

## 2024-09-20 RX ADMIN — TIMOLOL MALEATE 1 DROP: 5 SOLUTION/ DROPS OPHTHALMIC at 09:59

## 2024-09-20 RX ADMIN — TRAMADOL HYDROCHLORIDE 50 MG: 50 TABLET ORAL at 02:22

## 2024-09-20 RX ADMIN — INSULIN HUMAN 4 UNITS: 100 INJECTION, SOLUTION PARENTERAL at 11:55

## 2024-09-20 RX ADMIN — LATANOPROST 1 DROP: 50 SOLUTION/ DROPS OPHTHALMIC at 20:08

## 2024-09-20 RX ADMIN — INSULIN GLARGINE 10 UNITS: 100 INJECTION, SOLUTION SUBCUTANEOUS at 09:51

## 2024-09-20 ASSESSMENT — COGNITIVE AND FUNCTIONAL STATUS - GENERAL
MOVING FROM LYING ON BACK TO SITTING ON SIDE OF FLAT BED WITH BEDRAILS: A LOT
STANDING UP FROM CHAIR USING ARMS: A LITTLE
PERSONAL GROOMING: A LITTLE
WALKING IN HOSPITAL ROOM: A LITTLE
MOBILITY SCORE: 16
TOILETING: A LOT
DRESSING REGULAR UPPER BODY CLOTHING: A LOT
CLIMB 3 TO 5 STEPS WITH RAILING: A LITTLE
TURNING FROM BACK TO SIDE WHILE IN FLAT BAD: A LOT
HELP NEEDED FOR BATHING: A LOT
MOVING TO AND FROM BED TO CHAIR: A LITTLE
DAILY ACTIVITIY SCORE: 15
DRESSING REGULAR LOWER BODY CLOTHING: A LOT

## 2024-09-20 ASSESSMENT — PAIN SCALES - GENERAL
PAINLEVEL_OUTOF10: 6
PAINLEVEL_OUTOF10: 6
PAINLEVEL_OUTOF10: 0 - NO PAIN
PAINLEVEL_OUTOF10: 6
PAINLEVEL_OUTOF10: 3
PAINLEVEL_OUTOF10: 6
PAINLEVEL_OUTOF10: 1

## 2024-09-20 ASSESSMENT — PAIN - FUNCTIONAL ASSESSMENT
PAIN_FUNCTIONAL_ASSESSMENT: 0-10

## 2024-09-20 ASSESSMENT — PAIN DESCRIPTION - LOCATION: LOCATION: SHOULDER

## 2024-09-20 ASSESSMENT — ACTIVITIES OF DAILY LIVING (ADL)
EFFECT OF PAIN ON DAILY ACTIVITIES: IMPAIRED MOBILITY
HOME_MANAGEMENT_TIME_ENTRY: 16

## 2024-09-20 ASSESSMENT — PAIN SCALES - WONG BAKER
WONGBAKER_NUMERICALRESPONSE: HURTS LITTLE MORE
WONGBAKER_NUMERICALRESPONSE: HURTS LITTLE BIT

## 2024-09-20 ASSESSMENT — PAIN DESCRIPTION - ORIENTATION: ORIENTATION: LEFT;RIGHT

## 2024-09-20 ASSESSMENT — PAIN DESCRIPTION - DESCRIPTORS: DESCRIPTORS: DISCOMFORT

## 2024-09-20 NOTE — PROGRESS NOTES
Occupational Therapy    Occupational Therapy Treatment    Name: Shoshana Thomas  MRN: [id number]  Department: [address]  Room: [address] [place] [address]  Date: 09/20/24  Time Calculation  Start Time: 0831  Stop Time: 0855  Time Calculation (min): 24 min    Assessment:  OT Assessment: Patient continues to demonstrate progress towards OT POC; primarily limited today by decreased activity tolerance, pain. Will continue to follow OT POC to maximize pt’s independence in ADL/IADL’s, endurance, functional mobility and transfers.  Prognosis: Good  Barriers to Discharge:  (decreased activity tolerance)  Evaluation/Treatment Tolerance: Patient limited by fatigue, Patient limited by pain  Medical Staff Made Aware: Yes  End of Session Communication: Bedside nurse  End of Session Patient Position: Up in chair, Alarm on  Plan:  Treatment Interventions: ADL retraining, Functional transfer training, UE strengthening/ROM, Endurance training, Patient/family training, Equipment evaluation/education, Neuromuscular reeducation, Compensatory technique education  OT Frequency: 3 times per week  OT Discharge Recommendations: Low intensity level of continued care, 24 hr supervision due to cognition  Equipment Recommended upon Discharge: Wheeled walker  OT Recommended Transfer Status: Assist of 1  OT - OK to Discharge: Yes    Subjective   Previous Visit Info:  OT Last Visit  OT Received On: 09/20/24  General:  General  Reason for Referral: decline in ADLs; fall  Referred By: Martínez Buckley MD  Past Medical History Relevant to Rehab: anemia, CAD, heart failure, CKD, ischemic stroke, HLD, DM type 2  Family/Caregiver Present: No  Prior to Session Communication: Bedside nurse  Patient Position Received: Up in chair, Alarm on  Preferred Learning Style: verbal, visual  General Comment: pt cleared by RN, pt seated chair, pt agreeable for OT.  Precautions:  Medical Precautions: Fall precautions    Pain Assessment:  Pain Assessment  Pain Assessment:  0-10  0-10 (Numeric) Pain Score: 6  Pain Type: Acute pain  Pain Location: Shoulder  Pain Orientation: Right, Left     Objective   Cognition:  Overall Cognitive Status: Within Functional Limits  Orientation Level: Oriented X4  Activities of Daily Living:      Grooming  Grooming Level of Assistance: Setup, Close supervision  Grooming Where Assessed: Chair  Grooming Comments: pt completed face washing, tooth brushing, seated chair.    UE Bathing  UE Bathing Level of Assistance: Moderate assistance  UE Bathing Where Assessed:  (seated chair)  UE Bathing Comments: pt completed chest, B armpit, B UE washing, Mod A, with set up. Pt limited by pain and decreased activity tolerance.    UE Dressing  UE Dressing Level of Assistance: Moderate assistance  UE Dressing Where Assessed: Chair  UE Dressing Comments: Mod A to don/doff gown seated chair. Pt limited by B shoulder pain.    Bed Mobility/Transfers: Bed Mobility  Bed Mobility: No    Transfers  Transfer: Yes  Transfer 1  Transfer From 1: Chair with arms to  Transfer to 1: Stand  Technique 1: Sit to stand  Transfer Device 1: Walker  Transfer Level of Assistance 1: Minimum assistance  Trials/Comments 1: VC for hand placement and FWW mgmt.  Transfers 2  Transfer From 2: Stand to  Transfer to 2: Chair with arms  Technique 2: Stand to sit  Transfer Device 2: Walker  Transfer Level of Assistance 2: Contact guard  Trials/Comments 2: VC for hand placement prior to descent.    Functional Mobility:  Functional Mobility  Functional Mobility Performed: Yes  Functional Mobility 1  Surface 1: Level tile  Device 1: Rolling walker  Assistance 1: Contact guard  Comments 1: pt completed short household distance to/from chair- around the bed-chair, CGA, FWW. Increased time d/t decreased activity tolerance and pain.    Outcome Measures:  [facility] Daily Activity  Putting on and taking off regular lower body clothing: A lot  Bathing (including washing, rinsing, drying): A lot  Putting on and taking  off regular upper body clothing: A lot  Toileting, which includes using toilet, bedpan or urinal: A lot  Taking care of personal grooming such as brushing teeth: A little  Eating Meals: None  Daily Activity - Total Score: 15    Education Documentation  Body Mechanics, taught by Richardson Middleton OT at 9/20/2024 10:37 AM.  Learner: Patient  Readiness: Acceptance  Method: Explanation  Response: Verbalizes Understanding    Precautions, taught by Richardson Middleton OT at 9/20/2024 10:37 AM.  Learner: Patient  Readiness: Acceptance  Method: Explanation  Response: Verbalizes Understanding    ADL Training, taught by Richardson Middleton OT at 9/20/2024 10:37 AM.  Learner: Patient  Readiness: Acceptance  Method: Explanation  Response: Verbalizes Understanding    Education Comments  No comments found.      Goals:  Encounter Problems       Encounter Problems (Active)       OT Goals       ADLs (Progressing)       Start:  09/19/24    Expected End:  10/10/24       Patient will complete ADL tasks, with supervision, using AE need in order to increase patient's safety and independence with self-care tasks.          Functional Transfers (Progressing)       Start:  09/19/24    Expected End:  10/10/24       Patient will complete functional transfers with modified independence, using least restrictive device, in order to increase patient's safety and independence with daily tasks.          Functional Mobility (Progressing)       Start:  09/19/24    Expected End:  10/10/24       Patient will demonstrate the ability to complete item retrieval and functional mobility with modified independence, in order to increase safety and independence with daily tasks.          Activity Tolerance  (Progressing)       Start:  09/19/24    Expected End:  10/10/24       Patient will demonstrate the ability to participate in functional activity at least >/= 25 minutes in order to increase patient's safety and independence with daily tasks.

## 2024-09-20 NOTE — CARE PLAN
The patient's goals for the shift include get stronger    The clinical goals for the shift include maintain safety    Over the shift, the patient did not make progress toward the following goals. Barriers to progression include bilateral shoulder pain. Recommendations to address these barriers include pain medication.

## 2024-09-20 NOTE — NURSING NOTE
Patient consumed medications without difficulty, no needs verbalized at this time, no s/s of respiratory distress pain or discomfort, call light within reach safety measures remain in place.

## 2024-09-20 NOTE — PROGRESS NOTES
"Shoshana Thomas is a 89 y.o. female on day 0 of admission presenting with Fall at home, initial encounter.    Subjective   She had no acute complaints. She still has pain in the tailbone. Her granddaughter's , Pantera, was at the bedside.        Objective     Physical Exam  General: awake, alert, oriented, responsive  Cardiovascular: regular, normal S1 and S2  Lungs: good air entry bilaterally, clear to auscultation  Abdomen: soft, nontender, bowel sounds present, normoactive  Extremities: no peripheral cyanosis, no pedal edema  Neuro: alert, oriented x 3, no focal weakness      Last Recorded Vitals  Blood pressure 143/65, pulse 54, temperature 36.4 °C (97.5 °F), temperature source Oral, resp. rate 17, height 1.602 m (5' 3.07\"), weight 90.3 kg (199 lb 1.2 oz), SpO2 99%.  Intake/Output last 3 Shifts:  I/O last 3 completed shifts:  In: 1240 (13.7 mL/kg) [P.O.:240; IV Piggyback:1000]  Out: 201 (2.2 mL/kg) [Urine:200 (0.1 mL/kg/hr); Stool:1]  Weight: 90.3 kg     Relevant Results  Lab Results   Component Value Date    WBC 8.7 09/20/2024    HGB 11.1 (L) 09/20/2024    HCT 34.3 (L) 09/20/2024     09/20/2024     09/20/2024     Lab Results   Component Value Date    GLUCOSE 136 (H) 09/20/2024    CALCIUM 8.5 (L) 09/20/2024     09/20/2024    K 3.3 (L) 09/20/2024    CO2 30 09/20/2024     09/20/2024    BUN 23 09/20/2024    CREATININE 1.77 (H) 09/20/2024     Scheduled medications  atorvastatin, 20 mg, oral, Daily  calcitriol, 0.25 mcg, oral, Daily  carvedilol, 3.125 mg, oral, BID after meals  ferrous gluconate, 38 mg of iron, oral, Every other day  hydrALAZINE, 50 mg, oral, BID  insulin glargine, 10 Units, subcutaneous, q AM  insulin regular, 0-10 Units, subcutaneous, TID  isosorbide mononitrate ER, 60 mg, oral, Daily  latanoprost, 1 drop, ophthalmic (eye), Nightly  pantoprazole, 40 mg, oral, Daily before breakfast  polyethylene glycol, 17 g, oral, Daily  potassium chloride CR, 10 mEq, oral, BID " after meals  timolol, 1 drop, Both Eyes, q AM  torsemide, 50 mg, oral, BID  warfarin, 3 mg, oral, Daily      Continuous medications     PRN medications  PRN medications: acetaminophen **OR** acetaminophen **OR** acetaminophen, dextrose, dextrose, glucagon, glucagon, melatonin, ondansetron ODT **OR** ondansetron, traMADol         Assessment/Plan   Assessment & Plan    Fall  Mechanical fall with neck and shoulder pain  Improved  Pain medication as needed     Chronic kidney disease  Appears to be at baseline     Chronic atrial fibrillation  Heart rate is controlled  On long-term anticoagulation with Coumadin     Chronic diastolic heart failure  Stable     Hypokalemia  Continue oral potassium chloride     Type 2 diabetes mellitus with hyperglycemia  Continue Lantus  Humalog sliding scale  Hemoglobin A1c was 7.2% on 6/26    Plan  X-ray of the sacrum and coccyx because of pain at the tailbone.   Activity as tolerated.   Anticipate discharge on 9/21    Martínez Buckley MD

## 2024-09-20 NOTE — PROGRESS NOTES
Physical Therapy Treatment    Patient Name: Shoshana Thomas  MRN: [id number]  Department: [address]  Room: [facility]  Today's Date: 9/20/2024  Time Calculation  Start Time: 0855  Stop Time: 0918  Time Calculation (min): 23 min         Assessment/Plan   PT Assessment  PT Assessment Results: Decreased strength, Decreased endurance, Impaired balance, Decreased mobility, Decreased cognition, Decreased safety awareness, Obesity, Decreased skin integrity, Pain  Rehab Prognosis: Good  Barriers to Discharge: assist from family for bed mobility, CGA for transfers/gait  Evaluation/Treatment Tolerance: Patient tolerated treatment well  Medical Staff Made Aware: Yes  Strengths: Premorbid level of function  Barriers to Participation: Comorbidities  End of Session Communication: Bedside nurse  Assessment Comment: Continues to demonstrate impaired safe mobility warranting skilled PT care  End of Session Patient Position: Bed, 3 rail up, Alarm on  PT Plan  Inpatient/Swing Bed or Outpatient: Inpatient  PT Plan  Treatment/Interventions: Bed mobility, Transfer training, Gait training, Stair training, Balance training, Strengthening, Endurance training, Range of motion, Therapeutic exercise, Therapeutic activity, Home exercise program  PT Plan: Ongoing PT  PT Frequency: 4 times per week  PT Discharge Recommendations: Low intensity level of continued care  Equipment Recommended upon Discharge: Wheeled walker  PT Recommended Transfer Status: Assist x1, Assistive device  PT - OK to Discharge: Yes      General Visit Information:   PT  Visit  PT Received On: 09/20/24  Response to Previous Treatment: Patient with no complaints from previous session.  General  Reason for Referral: Impaired Mobility  Referred By: Dr. Buckley  Family/Caregiver Present: No  Caregiver Feedback: grandson in law  Prior to Session Communication: Bedside nurse  Patient Position Received: Alarm on, Up in chair  Preferred Learning Style: verbal, visual  General Comment:  Agreeable to PT follow up    Subjective   Precautions:  Precautions  Hearing/Visual Limitations: mild [place]  Medical Precautions: Fall precautions    Vital Signs (Past 2hrs)        Date/Time Vitals Session Patient Position Pulse Resp SpO2 BP MAP (mmHg)    09/20/24 0746 --  --  53  18  98 %  140/57  77                         Objective   Pain:  Pain Assessment  Pain Assessment: 0-10  0-10 (Numeric) Pain Score: 6  Pain Type: Acute pain  Pain Location:  (L shoulder, neck, tailbone--RN aware)  Pain Interventions: Ambulation/increased activity (pain meds provided by RN prior to session. Discussed log roll for pain control due to spinal Fxs)  Cognition:  Cognition  Overall Cognitive Status: Impaired  Orientation Level: Oriented X4  Following Commands: Follows all commands and directions without difficulty  Cognition Comments: Distractable  Memory: Exceptions to WFL  Short-Term Memory: Impaired  Insight: Mild  Impulsive: Mildly    Activity Tolerance:  Activity Tolerance  Endurance: Decreased tolerance for upright activites  Treatments:  Bed Mobility  Bed Mobility: Yes  Bed Mobility 1  Bed Mobility 1: Sitting to supine, Log roll  Level of Assistance 1: Moderate assistance  Bed Mobility Comments 1: Long discussion on log roll technique for pain control. Assist for BLEs back into bed and to facilitate rolling back onto back.    Ambulation/Gait Training  Ambulation/Gait Training Performed: Yes  Ambulation/Gait Training 1  Surface 1: Level tile  Device 1: Rolling walker  Assistance 1: Close supervision  Quality of Gait 1:  (slow, mildly shuffled, cues to keep device close at all times, cues to avoid kyphotic posture as Pt fatigues. 10/10 max fatigue at end of trial)  Comments/Distance (ft) 1: 210  Transfers  Transfer: Yes  Transfer 1  Transfer From 1: Chair with arms to  Transfer to 1: Stand  Technique 1: Sit to stand, Stand to sit  Transfer Device 1: Walker  Transfer Level of Assistance 1: Contact guard  Trials/Comments 1:  laborous but self completed. Cues on anterior weight shifting and B hand placement  Transfers 2  Transfer From 2: Wheelchair to  Transfer to 2: Stand  Technique 2: Sit to stand, Stand to sit  Transfer Device 2: Walker  Transfer Level of Assistance 2: Contact guard  Trials/Comments 2: BUE pushoff from WC. Slow but self completed. CGA for safety    Outcome Measures:  [facility] Basic Mobility  Turning from your back to your side while in a flat bed without using bedrails: A lot  Moving from lying on your back to sitting on the side of a flat bed without using bedrails: A lot  Moving to and from bed to chair (including a wheelchair): A little  Standing up from a chair using your arms (e.g. wheelchair or bedside chair): A little  To walk in hospital room: A little  Climbing 3-5 steps with railing: A little  Basic Mobility - Total Score: 16    Education Documentation  Mobility Training, taught by Monroe Ann, PT at 9/20/2024  9:43 AM.  Learner: Patient  Readiness: Acceptance  Method: Explanation, Demonstration  Response: Needs Reinforcement, Verbalizes Understanding  Comment: safe mobility techniques, fall risk management, proper AD use, spine precautions/log roll for back pain control    Education Comments  No comments found.        OP EDUCATION:       Encounter Problems       Encounter Problems (Active)       Balance       Standing Balance (Progressing)       Start:  09/19/24    Expected End:  10/03/24       Pt will demonstrate good static standing balance to promote safe participation with out of bed activity, transfers, and mobility              Mobility       Ambulation (Progressing)       Start:  09/19/24    Expected End:  10/03/24       Pt will ambulate 50' modified independent assist with walker to promote safe home mobility           Entry Stair Negotiation (Progressing)       Start:  09/19/24    Expected End:  10/03/24       Pt will ascend/descend 4 stairs with rail(s) on B and modified independent assist to  promote safe entry and exit in home environment                PT Transfers       Supine to sit (Progressing)       Start:  09/19/24    Expected End:  10/03/24       Pt will transfer supine to sitting at edge of bed with modified independent assist to promote acute care out of bed activity           Sit to stand (Progressing)       Start:  09/19/24    Expected End:  10/03/24       Pt will transfer sit to standing position with modified independent assist and walker to promote safe out of bed activity              Pain - Adult          Safety       Safe Mobility Techniques (Progressing)       Start:  09/19/24    Expected End:  10/03/24       Pt will correctly identify and demonstrate safe mobility techniques to reduce their risks for falls during their acute care stay            Spine Precautions for pain control (Progressing)       Start:  09/19/24    Expected End:  10/03/24       Pt will be independent with both understanding and demonstration of post op spine restrictions to promote safe functional mobility at discharge

## 2024-09-21 LAB
ANION GAP SERPL CALCULATED.3IONS-SCNC: 13 MMOL/L (ref 10–20)
BUN SERPL-MCNC: 22 MG/DL (ref 6–23)
CALCIUM SERPL-MCNC: 8.7 MG/DL (ref 8.6–10.3)
CHLORIDE SERPL-SCNC: 104 MMOL/L (ref 98–107)
CO2 SERPL-SCNC: 29 MMOL/L (ref 21–32)
CREAT SERPL-MCNC: 1.75 MG/DL (ref 0.5–1.05)
EGFRCR SERPLBLD CKD-EPI 2021: 28 ML/MIN/1.73M*2
ERYTHROCYTE [DISTWIDTH] IN BLOOD BY AUTOMATED COUNT: 14.2 % (ref 11.5–14.5)
GLUCOSE BLD MANUAL STRIP-MCNC: 127 MG/DL (ref 74–99)
GLUCOSE BLD MANUAL STRIP-MCNC: 146 MG/DL (ref 74–99)
GLUCOSE BLD MANUAL STRIP-MCNC: 213 MG/DL (ref 74–99)
GLUCOSE BLD MANUAL STRIP-MCNC: 267 MG/DL (ref 74–99)
GLUCOSE SERPL-MCNC: 145 MG/DL (ref 74–99)
HCT VFR BLD AUTO: 36.9 % (ref 36–46)
HGB BLD-MCNC: 11.8 G/DL (ref 12–16)
INR PPP: 1.7 (ref 0.9–1.2)
MCH RBC QN AUTO: 33.1 PG (ref 26–34)
MCHC RBC AUTO-ENTMCNC: 32 G/DL (ref 32–36)
MCV RBC AUTO: 103 FL (ref 80–100)
NRBC BLD-RTO: 0 /100 WBCS (ref 0–0)
PLATELET # BLD AUTO: 154 X10*3/UL (ref 150–450)
POTASSIUM SERPL-SCNC: 4.1 MMOL/L (ref 3.5–5.3)
PROTHROMBIN TIME: 16.9 SECONDS (ref 9.3–12.7)
RBC # BLD AUTO: 3.57 X10*6/UL (ref 4–5.2)
SODIUM SERPL-SCNC: 142 MMOL/L (ref 136–145)
WBC # BLD AUTO: 8.8 X10*3/UL (ref 4.4–11.3)

## 2024-09-21 PROCEDURE — 82947 ASSAY GLUCOSE BLOOD QUANT: CPT

## 2024-09-21 PROCEDURE — 36415 COLL VENOUS BLD VENIPUNCTURE: CPT | Performed by: INTERNAL MEDICINE

## 2024-09-21 PROCEDURE — 85027 COMPLETE CBC AUTOMATED: CPT | Performed by: INTERNAL MEDICINE

## 2024-09-21 PROCEDURE — 2500000001 HC RX 250 WO HCPCS SELF ADMINISTERED DRUGS (ALT 637 FOR MEDICARE OP): Performed by: INTERNAL MEDICINE

## 2024-09-21 PROCEDURE — G0378 HOSPITAL OBSERVATION PER HR: HCPCS

## 2024-09-21 PROCEDURE — 99232 SBSQ HOSP IP/OBS MODERATE 35: CPT | Performed by: INTERNAL MEDICINE

## 2024-09-21 PROCEDURE — 85610 PROTHROMBIN TIME: CPT | Performed by: INTERNAL MEDICINE

## 2024-09-21 PROCEDURE — 80048 BASIC METABOLIC PNL TOTAL CA: CPT | Performed by: INTERNAL MEDICINE

## 2024-09-21 PROCEDURE — 2500000002 HC RX 250 W HCPCS SELF ADMINISTERED DRUGS (ALT 637 FOR MEDICARE OP, ALT 636 FOR OP/ED): Performed by: INTERNAL MEDICINE

## 2024-09-21 RX ADMIN — PANTOPRAZOLE SODIUM 40 MG: 40 TABLET, DELAYED RELEASE ORAL at 05:42

## 2024-09-21 RX ADMIN — HYDRALAZINE HYDROCHLORIDE 50 MG: 50 TABLET ORAL at 20:01

## 2024-09-21 RX ADMIN — POTASSIUM CHLORIDE 10 MEQ: 750 TABLET, EXTENDED RELEASE ORAL at 17:15

## 2024-09-21 RX ADMIN — CALCITRIOL CAPSULES 0.25 MCG 0.25 MCG: 0.25 CAPSULE ORAL at 09:23

## 2024-09-21 RX ADMIN — ACETAMINOPHEN 650 MG: 325 TABLET ORAL at 17:15

## 2024-09-21 RX ADMIN — TORSEMIDE 50 MG: 100 TABLET ORAL at 05:42

## 2024-09-21 RX ADMIN — INSULIN GLARGINE 10 UNITS: 100 INJECTION, SOLUTION SUBCUTANEOUS at 09:23

## 2024-09-21 RX ADMIN — TIMOLOL MALEATE 1 DROP: 5 SOLUTION/ DROPS OPHTHALMIC at 09:39

## 2024-09-21 RX ADMIN — INSULIN HUMAN 6 UNITS: 100 INJECTION, SOLUTION PARENTERAL at 13:54

## 2024-09-21 RX ADMIN — HYDRALAZINE HYDROCHLORIDE 50 MG: 50 TABLET ORAL at 09:23

## 2024-09-21 RX ADMIN — TORSEMIDE 50 MG: 100 TABLET ORAL at 17:15

## 2024-09-21 RX ADMIN — LATANOPROST 1 DROP: 50 SOLUTION/ DROPS OPHTHALMIC at 20:01

## 2024-09-21 RX ADMIN — POTASSIUM CHLORIDE 10 MEQ: 750 TABLET, EXTENDED RELEASE ORAL at 09:23

## 2024-09-21 RX ADMIN — CARVEDILOL 3.12 MG: 3.12 TABLET, FILM COATED ORAL at 17:15

## 2024-09-21 RX ADMIN — INSULIN HUMAN 4 UNITS: 100 INJECTION, SOLUTION PARENTERAL at 18:05

## 2024-09-21 RX ADMIN — ATORVASTATIN CALCIUM 20 MG: 20 TABLET, FILM COATED ORAL at 09:23

## 2024-09-21 RX ADMIN — ISOSORBIDE MONONITRATE 60 MG: 60 TABLET, EXTENDED RELEASE ORAL at 09:23

## 2024-09-21 RX ADMIN — CARVEDILOL 3.12 MG: 3.12 TABLET, FILM COATED ORAL at 09:23

## 2024-09-21 ASSESSMENT — COGNITIVE AND FUNCTIONAL STATUS - GENERAL
TOILETING: A LITTLE
STANDING UP FROM CHAIR USING ARMS: A LOT
MOBILITY SCORE: 16
EATING MEALS: A LITTLE
DAILY ACTIVITIY SCORE: 18
DAILY ACTIVITIY SCORE: 18
HELP NEEDED FOR BATHING: A LITTLE
MOBILITY SCORE: 16
DRESSING REGULAR LOWER BODY CLOTHING: A LITTLE
MOVING TO AND FROM BED TO CHAIR: A LOT
CLIMB 3 TO 5 STEPS WITH RAILING: A LOT
WALKING IN HOSPITAL ROOM: A LITTLE
DRESSING REGULAR LOWER BODY CLOTHING: A LITTLE
TOILETING: A LITTLE
MOVING TO AND FROM BED TO CHAIR: A LOT
HELP NEEDED FOR BATHING: A LITTLE
DRESSING REGULAR UPPER BODY CLOTHING: A LITTLE
PERSONAL GROOMING: A LITTLE
WALKING IN HOSPITAL ROOM: A LITTLE
TURNING FROM BACK TO SIDE WHILE IN FLAT BAD: A LITTLE
STANDING UP FROM CHAIR USING ARMS: A LOT
CLIMB 3 TO 5 STEPS WITH RAILING: A LOT
DRESSING REGULAR UPPER BODY CLOTHING: A LITTLE
EATING MEALS: A LITTLE
TURNING FROM BACK TO SIDE WHILE IN FLAT BAD: A LITTLE
PERSONAL GROOMING: A LITTLE

## 2024-09-21 ASSESSMENT — PAIN SCALES - GENERAL
PAINLEVEL_OUTOF10: 2
PAINLEVEL_OUTOF10: 0 - NO PAIN
PAINLEVEL_OUTOF10: 8

## 2024-09-21 ASSESSMENT — PAIN DESCRIPTION - LOCATION: LOCATION: HEAD

## 2024-09-21 ASSESSMENT — PAIN DESCRIPTION - ORIENTATION: ORIENTATION: MID

## 2024-09-21 ASSESSMENT — PAIN - FUNCTIONAL ASSESSMENT: PAIN_FUNCTIONAL_ASSESSMENT: 0-10

## 2024-09-21 NOTE — PROGRESS NOTES
"Shoshana Thomas is a 89 y.o. female on day 0 of admission presenting with Fall at home, initial encounter.    Subjective   She had no acute complaints. She still has pain in the tailbone.  The x-ray of the sacrum and coccyx was negative for fractures.  She states that she has 4 steps that she needs to climb to get into her home, she was not certain if she can climb them.       Objective     Physical Exam  General: awake, alert, oriented, responsive  Cardiovascular: regular, normal S1 and S2  Lungs: good air entry bilaterally, clear to auscultation  Abdomen: soft, nontender, bowel sounds present, normoactive  Extremities: no peripheral cyanosis, no pedal edema  Neuro: alert, oriented x 3, no focal weakness      Last Recorded Vitals  Blood pressure 176/66, pulse 64, temperature 36.9 °C (98.4 °F), temperature source Oral, resp. rate 18, height 1.602 m (5' 3.07\"), weight 90.3 kg (199 lb 1.2 oz), SpO2 97%.  Intake/Output last 3 Shifts:  I/O last 3 completed shifts:  In: 240 (2.7 mL/kg) [P.O.:240]  Out: - (0 mL/kg)   Weight: 90.3 kg     Relevant Results  Lab Results   Component Value Date    WBC 8.8 09/21/2024    HGB 11.8 (L) 09/21/2024    HCT 36.9 09/21/2024     (H) 09/21/2024     09/21/2024     Lab Results   Component Value Date    GLUCOSE 145 (H) 09/21/2024    CALCIUM 8.7 09/21/2024     09/21/2024    K 4.1 09/21/2024    CO2 29 09/21/2024     09/21/2024    BUN 22 09/21/2024    CREATININE 1.75 (H) 09/21/2024     Scheduled medications  atorvastatin, 20 mg, oral, Daily  calcitriol, 0.25 mcg, oral, Daily  carvedilol, 3.125 mg, oral, BID after meals  ferrous gluconate, 38 mg of iron, oral, Every other day  hydrALAZINE, 50 mg, oral, BID  insulin glargine, 10 Units, subcutaneous, q AM  insulin regular, 0-10 Units, subcutaneous, TID  isosorbide mononitrate ER, 60 mg, oral, Daily  latanoprost, 1 drop, ophthalmic (eye), Nightly  pantoprazole, 40 mg, oral, Daily before breakfast  polyethylene glycol, 17 " g, oral, Daily  potassium chloride CR, 10 mEq, oral, BID after meals  timolol, 1 drop, Both Eyes, q AM  torsemide, 50 mg, oral, BID  warfarin, 3 mg, oral, Daily      Continuous medications     PRN medications  PRN medications: acetaminophen **OR** acetaminophen **OR** acetaminophen, dextrose, dextrose, glucagon, glucagon, melatonin, ondansetron ODT **OR** ondansetron, traMADol         Assessment/Plan   Assessment & Plan    Fall  Mechanical fall with neck and shoulder pain  Improved  Pain medication as needed     Chronic kidney disease  Appears to be at baseline     Chronic atrial fibrillation  Heart rate is controlled    Long-term use of anticoagulant  On Coumadin for atrial fibrillation.  Monitor INR     Chronic diastolic heart failure  Stable     Hypokalemia  Continue oral potassium chloride     Type 2 diabetes mellitus with hyperglycemia  Continue Lantus  Humalog sliding scale  Hemoglobin A1c was 7.2% on 6/26    Plan  Continue activity as tolerated  Pain medication as needed  Discussed with the patient nurse.  We will try to have PT evaluate the patient for climbing of steps.  Coumadin regimen was in the process of being modified as an outpatient because she had a recent supratherapeutic INR of 4.5 per her report.  Continue Coumadin 3 mg daily and monitor INR which is currently subtherapeutic but slowly increasing.    Martínez Buckley MD

## 2024-09-21 NOTE — CARE PLAN
The patient's goals for the shift include get stronger    The clinical goals for the shift include safety     19-Feb-2017 14:09

## 2024-09-21 NOTE — CARE PLAN
The patient's goals for the shift include get stronger    The clinical goals for the shift include safety

## 2024-09-22 ENCOUNTER — DOCUMENTATION (OUTPATIENT)
Dept: HOME HEALTH SERVICES | Facility: HOME HEALTH | Age: 89
End: 2024-09-22
Payer: MEDICARE

## 2024-09-22 ENCOUNTER — HOME HEALTH ADMISSION (OUTPATIENT)
Dept: HOME HEALTH SERVICES | Facility: HOME HEALTH | Age: 89
End: 2024-09-22
Payer: MEDICARE

## 2024-09-22 VITALS
TEMPERATURE: 97.9 F | RESPIRATION RATE: 18 BRPM | HEIGHT: 63 IN | BODY MASS INDEX: 35.27 KG/M2 | WEIGHT: 199.08 LBS | DIASTOLIC BLOOD PRESSURE: 70 MMHG | SYSTOLIC BLOOD PRESSURE: 165 MMHG | OXYGEN SATURATION: 98 % | HEART RATE: 60 BPM
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
## 2024-09-22 LAB
ANION GAP SERPL CALCULATED.3IONS-SCNC: 11 MMOL/L (ref 10–20)
BUN SERPL-MCNC: 21 MG/DL (ref 6–23)
CALCIUM SERPL-MCNC: 8.7 MG/DL (ref 8.6–10.3)
CHLORIDE SERPL-SCNC: 104 MMOL/L (ref 98–107)
CO2 SERPL-SCNC: 30 MMOL/L (ref 21–32)
CREAT SERPL-MCNC: 1.71 MG/DL (ref 0.5–1.05)
EGFRCR SERPLBLD CKD-EPI 2021: 28 ML/MIN/1.73M*2
ERYTHROCYTE [DISTWIDTH] IN BLOOD BY AUTOMATED COUNT: 14 % (ref 11.5–14.5)
GLUCOSE BLD MANUAL STRIP-MCNC: 139 MG/DL (ref 74–99)
GLUCOSE BLD MANUAL STRIP-MCNC: 227 MG/DL (ref 74–99)
GLUCOSE SERPL-MCNC: 138 MG/DL (ref 74–99)
HCT VFR BLD AUTO: 32.9 % (ref 36–46)
HGB BLD-MCNC: 10.8 G/DL (ref 12–16)
INR PPP: 1.8 (ref 0.9–1.2)
MCH RBC QN AUTO: 32.9 PG (ref 26–34)
MCHC RBC AUTO-ENTMCNC: 32.8 G/DL (ref 32–36)
MCV RBC AUTO: 100 FL (ref 80–100)
NRBC BLD-RTO: 0 /100 WBCS (ref 0–0)
PLATELET # BLD AUTO: 171 X10*3/UL (ref 150–450)
POTASSIUM SERPL-SCNC: 3.4 MMOL/L (ref 3.5–5.3)
PROTHROMBIN TIME: 17.5 SECONDS (ref 9.3–12.7)
RBC # BLD AUTO: 3.28 X10*6/UL (ref 4–5.2)
SODIUM SERPL-SCNC: 142 MMOL/L (ref 136–145)
WBC # BLD AUTO: 8 X10*3/UL (ref 4.4–11.3)

## 2024-09-22 PROCEDURE — 36415 COLL VENOUS BLD VENIPUNCTURE: CPT | Performed by: INTERNAL MEDICINE

## 2024-09-22 PROCEDURE — 97116 GAIT TRAINING THERAPY: CPT | Mod: GP

## 2024-09-22 PROCEDURE — 82947 ASSAY GLUCOSE BLOOD QUANT: CPT

## 2024-09-22 PROCEDURE — 85610 PROTHROMBIN TIME: CPT | Performed by: INTERNAL MEDICINE

## 2024-09-22 PROCEDURE — 80048 BASIC METABOLIC PNL TOTAL CA: CPT | Performed by: INTERNAL MEDICINE

## 2024-09-22 PROCEDURE — 99239 HOSP IP/OBS DSCHRG MGMT >30: CPT | Performed by: INTERNAL MEDICINE

## 2024-09-22 PROCEDURE — 85027 COMPLETE CBC AUTOMATED: CPT | Performed by: INTERNAL MEDICINE

## 2024-09-22 PROCEDURE — 2500000002 HC RX 250 W HCPCS SELF ADMINISTERED DRUGS (ALT 637 FOR MEDICARE OP, ALT 636 FOR OP/ED): Performed by: INTERNAL MEDICINE

## 2024-09-22 PROCEDURE — 1200000002 HC GENERAL ROOM WITH TELEMETRY DAILY

## 2024-09-22 PROCEDURE — 2500000001 HC RX 250 WO HCPCS SELF ADMINISTERED DRUGS (ALT 637 FOR MEDICARE OP): Performed by: INTERNAL MEDICINE

## 2024-09-22 RX ORDER — POTASSIUM CHLORIDE 1.5 G/1.58G
40 POWDER, FOR SOLUTION ORAL ONCE
Status: COMPLETED | OUTPATIENT
Start: 2024-09-22 | End: 2024-09-22

## 2024-09-22 RX ORDER — WARFARIN 6 MG/1
6 TABLET ORAL SEE ADMIN INSTRUCTIONS
Start: 2024-09-22

## 2024-09-22 RX ORDER — INSULIN GLARGINE 100 [IU]/ML
12 INJECTION, SOLUTION SUBCUTANEOUS EVERY MORNING
Start: 2024-09-22

## 2024-09-22 RX ADMIN — ACETAMINOPHEN 650 MG: 325 TABLET ORAL at 10:57

## 2024-09-22 RX ADMIN — INSULIN GLARGINE 10 UNITS: 100 INJECTION, SOLUTION SUBCUTANEOUS at 08:43

## 2024-09-22 RX ADMIN — ATORVASTATIN CALCIUM 20 MG: 20 TABLET, FILM COATED ORAL at 08:43

## 2024-09-22 RX ADMIN — CALCITRIOL CAPSULES 0.25 MCG 0.25 MCG: 0.25 CAPSULE ORAL at 08:43

## 2024-09-22 RX ADMIN — INSULIN HUMAN 4 UNITS: 100 INJECTION, SOLUTION PARENTERAL at 11:45

## 2024-09-22 RX ADMIN — PANTOPRAZOLE SODIUM 40 MG: 40 TABLET, DELAYED RELEASE ORAL at 05:07

## 2024-09-22 RX ADMIN — Medication 38 MG OF ELEMENTAL IRON: at 08:43

## 2024-09-22 RX ADMIN — POTASSIUM CHLORIDE 10 MEQ: 750 TABLET, EXTENDED RELEASE ORAL at 08:43

## 2024-09-22 RX ADMIN — POTASSIUM CHLORIDE 40 MEQ: 1.5 FOR SOLUTION ORAL at 08:43

## 2024-09-22 RX ADMIN — ISOSORBIDE MONONITRATE 60 MG: 60 TABLET, EXTENDED RELEASE ORAL at 08:43

## 2024-09-22 RX ADMIN — TIMOLOL MALEATE 1 DROP: 5 SOLUTION/ DROPS OPHTHALMIC at 09:04

## 2024-09-22 RX ADMIN — HYDRALAZINE HYDROCHLORIDE 50 MG: 50 TABLET ORAL at 08:43

## 2024-09-22 RX ADMIN — CARVEDILOL 3.12 MG: 3.12 TABLET, FILM COATED ORAL at 08:43

## 2024-09-22 RX ADMIN — TORSEMIDE 50 MG: 100 TABLET ORAL at 05:07

## 2024-09-22 ASSESSMENT — PAIN - FUNCTIONAL ASSESSMENT
PAIN_FUNCTIONAL_ASSESSMENT: WONG-BAKER FACES
PAIN_FUNCTIONAL_ASSESSMENT: 0-10

## 2024-09-22 ASSESSMENT — COGNITIVE AND FUNCTIONAL STATUS - GENERAL
STANDING UP FROM CHAIR USING ARMS: A LITTLE
CLIMB 3 TO 5 STEPS WITH RAILING: A LITTLE
DRESSING REGULAR UPPER BODY CLOTHING: A LITTLE
CLIMB 3 TO 5 STEPS WITH RAILING: A LOT
DAILY ACTIVITIY SCORE: 18
WALKING IN HOSPITAL ROOM: A LITTLE
MOBILITY SCORE: 19
TOILETING: A LITTLE
PERSONAL GROOMING: A LITTLE
MOVING FROM LYING ON BACK TO SITTING ON SIDE OF FLAT BED WITH BEDRAILS: A LITTLE
EATING MEALS: A LITTLE
MOVING TO AND FROM BED TO CHAIR: A LITTLE
TURNING FROM BACK TO SIDE WHILE IN FLAT BAD: A LITTLE
WALKING IN HOSPITAL ROOM: A LITTLE
MOVING TO AND FROM BED TO CHAIR: A LITTLE
STANDING UP FROM CHAIR USING ARMS: A LOT
DRESSING REGULAR LOWER BODY CLOTHING: A LITTLE
MOBILITY SCORE: 17
HELP NEEDED FOR BATHING: A LITTLE

## 2024-09-22 ASSESSMENT — PAIN SCALES - GENERAL
PAINLEVEL_OUTOF10: 3
PAINLEVEL_OUTOF10: 0 - NO PAIN
PAINLEVEL_OUTOF10: 4
PAINLEVEL_OUTOF10: 0 - NO PAIN

## 2024-09-22 ASSESSMENT — PAIN SCALES - WONG BAKER: WONGBAKER_NUMERICALRESPONSE: HURTS LITTLE BIT

## 2024-09-22 ASSESSMENT — PAIN DESCRIPTION - ORIENTATION: ORIENTATION: LEFT

## 2024-09-22 ASSESSMENT — PAIN DESCRIPTION - LOCATION: LOCATION: SHOULDER

## 2024-09-22 NOTE — PROGRESS NOTES
Physical Therapy Treatment    Patient Name: Shoshana Thomas  MRN: [id number]  Department: [address]  Room: [address] [place], [place] [address]  Today's Date: 9/22/2024  Time Calculation  Start Time: 1158  Stop Time: 1221  Time Calculation (min): 23 min         Assessment/Plan   PT Assessment  Barriers to Discharge: Assist x1 for mobility at home---has 24/7 support from family as needed, medical transport for MELANIE  Evaluation/Treatment Tolerance: Patient tolerated treatment well  Medical Staff Made Aware: Yes  End of Session Communication: Bedside nurse  Assessment Comment: Continues to demonstrate impaired safe mobility warranting skilled PT care. Low intensity rehab recommended at [place]. Recommend assist x1 for all mobility until rehab progress can be made--Pt agreeable. Recommend medical transport for MELANIE until rehab progress can made as well  End of Session Patient Position: Alarm on, Up in chair  PT Plan  Inpatient/Swing Bed or Outpatient: Inpatient  PT Plan  Treatment/Interventions: Bed mobility, Transfer training, Gait training, Stair training, Balance training, Strengthening, Endurance training, Range of motion, Therapeutic exercise, Therapeutic activity, Home exercise program  PT Plan: Ongoing PT  PT Frequency: 4 times per week  PT Discharge Recommendations: Low intensity level of continued care  Equipment Recommended upon Discharge: Wheeled walker  PT Recommended Transfer Status: Assist x1, Assistive device  PT - OK to Discharge: Yes      General Visit Information:   PT  Visit  PT Received On: 09/22/24  Response to Previous Treatment: Patient with no complaints from previous session.  General  Family/Caregiver Present: No  Prior to Session Communication: Bedside nurse  Patient Position Received: Bed, 3 rail up, Alarm on  Preferred Learning Style: verbal, visual  General Comment: Agreeable to PT follow up    Subjective   Precautions:  Precautions  Medical Precautions: Fall precautions      Objective   Pain:  Pain Assessment  Pain  Assessment: 0-10  0-10 (Numeric) Pain Score: 4  Pain Type: Chronic pain  Pain Location:  (tailbone, B shoulders post fall)  Pain Interventions: Ambulation/increased activity  Response to Interventions: unchanged  Cognition:  Cognition  Overall Cognitive Status: Impaired  Orientation Level: Oriented X4  Cognition Comments: Self limiting today. Requires a lot of motivation for participation of activities    Activity Tolerance:  Activity Tolerance  Endurance: Decreased tolerance for upright activites  Treatments:  Bed Mobility  Bed Mobility: Yes  Bed Mobility 1  Bed Mobility 1: Supine to sitting  Level of Assistance 1: Close supervision  Bed Mobility Comments 1: Discussion on technique. Pt reaches out to grab onto PT as if to pull from me. When encouraged to use L grab bar, Pt is able to bring BLEs off EOB and trunk up with cues on technique alone and no physical assist    Ambulation/Gait Training  Ambulation/Gait Training Performed: Yes  Ambulation/Gait Training 1  Surface 1: Level tile  Device 1: Rolling walker  Assistance 1: Close supervision  Quality of Gait 1:  (slow, shuffled, and kyphotic. Cues to keep device close at all times)  Comments/Distance (ft) 1: 75'x2  Transfers  Transfer: Yes  Transfer 1  Transfer From 1: Bed to  Transfer to 1: Stand  Technique 1: Sit to stand, Stand to sit  Transfer Device 1: Walker  Transfer Level of Assistance 1: Contact guard  Trials/Comments 1: CGA for safety. Cues for BUE pushoff  Transfers 2  Transfer From 2: Chair without arms to  Transfer to 2: Stand  Technique 2: Sit to stand, Stand to sit  Transfer Device 2: Walker  Transfer Level of Assistance 2: Minimum assistance  Trials/Comments 2: Significantly lower chair surface used for seated rest break with stair training. Min A needed for uprighting. Discussed avoidance of soft surfaces (couch), low chairs, or chair without arms when at home    Stairs  Stairs: Yes  Stairs  Rails 1: Bilateral  Device 1: No device  Assistance 1:  "Minimum assistance  Comment/Number of Steps 1: Very apprehensive about stair attempt today but agreeable after significant motivation from PT. 1 step x2 completed with B rails when Min A is given to steady Pt during uprighting phase. After 2 steps, Pt self terminates activity further and declines further stair training. Very anxious about stairs and will only tell me \"I can't do this, I can't do this\". Reviewed stair negotiation already practiced/completed but Pt declines further attempts (Recommend medical transport for MELANIE at home. CC aware. Education given on proper technique)    Outcome Measures:  [facility] Basic Mobility  Turning from your back to your side while in a flat bed without using bedrails: A little  Moving from lying on your back to sitting on the side of a flat bed without using bedrails: A little  Moving to and from bed to chair (including a wheelchair): A little  Standing up from a chair using your arms (e.g. wheelchair or bedside chair): A little  To walk in hospital room: A little  Climbing 3-5 steps with railing: A lot  Basic Mobility - Total Score: 17    Education Documentation  Mobility Training, taught by Monroe Ann, PT at 9/22/2024  1:05 PM.  Learner: Patient  Readiness: Acceptance  Method: Explanation, Demonstration  Response: Needs Reinforcement, Verbalizes Understanding  Comment: safe mobility techniques    Education Comments  No comments found.        OP EDUCATION:       Encounter Problems       Encounter Problems (Active)       Balance       Standing Balance (Progressing)       Start:  09/19/24    Expected End:  10/03/24       Pt will demonstrate good static standing balance to promote safe participation with out of bed activity, transfers, and mobility              Mobility       Ambulation (Progressing)       Start:  09/19/24    Expected End:  10/03/24       Pt will ambulate 50' modified independent assist with walker to promote safe home mobility           Entry Stair " Negotiation (Progressing)       Start:  09/19/24    Expected End:  10/03/24       Pt will ascend/descend 4 stairs with rail(s) on B and modified independent assist to promote safe entry and exit in home environment                PT Transfers       Supine to sit (Progressing)       Start:  09/19/24    Expected End:  10/03/24       Pt will transfer supine to sitting at edge of bed with modified independent assist to promote acute care out of bed activity           Sit to stand (Progressing)       Start:  09/19/24    Expected End:  10/03/24       Pt will transfer sit to standing position with modified independent assist and walker to promote safe out of bed activity              Pain - Adult          Safety       Safe Mobility Techniques (Progressing)       Start:  09/19/24    Expected End:  10/03/24       Pt will correctly identify and demonstrate safe mobility techniques to reduce their risks for falls during their acute care stay            Spine Precautions for pain control (Progressing)       Start:  09/19/24    Expected End:  10/03/24       Pt will be independent with both understanding and demonstration of post op spine restrictions to promote safe functional mobility at discharge

## 2024-09-22 NOTE — DISCHARGE SUMMARY
Discharge Diagnosis  Fall at home, initial encounter  Chronic kidney disease  Chronic atrial fibrillation  Long-term use of anticoagulants  Chronic diastolic heart failure  Type 2 diabetes mellitus with hyperglycemia    Issues Requiring Follow-Up      Discharge Meds     Medication List      CHANGE how you take these medications     Basaglar KwikPen U-100 Insulin 100 unit/mL (3 mL) pen; Generic drug:   insulin glargine; Inject 12 Units under the skin once daily in the   morning. As directed; What changed: how much to take   insulin lispro 100 unit/mL injection; Commonly known as: HumaLOG KwikPen   Insulin; Up to 30 units daily As directed; What changed: how much to take,   how to take this, when to take this   warfarin 6 mg tablet; Commonly known as: Coumadin; Take as directed. If   you are unsure how to take this medication, talk to your nurse or doctor.;   Original instructions: Take 1 tablet (6 mg) by mouth see administration   instructions. 1 tablet every Mon, Wed, Fri, 1/2 tablet every Tue, Thu,   Sat, Sun; What changed: additional instructions     CONTINUE taking these medications     Accu-Chek SmartView Test Strip strip; Generic drug: blood sugar   diagnostic; USE 1 STRIP INTO VITRO, 3 TIMES A DAY-- E11.65   acetaminophen 325 mg tablet; Commonly known as: Tylenol   atorvastatin 20 mg tablet; Commonly known as: Lipitor; TAKE 1 TABLET BY   MOUTH EVERY DAY   calcitriol 0.25 mcg capsule; Commonly known as: Rocaltrol   calcium carbonate-vitamin D3 500 mg-5 mcg (200 unit) tablet   carvedilol 3.125 mg tablet; Commonly known as: Coreg; Take 1 tablet   (3.125 mg) by mouth 2 times a day.   ferrous gluconate 240 (27 Fe) MG tablet; Commonly known as: Ferate; Take   1 tablet (27 mg) by mouth every other day.   hydrALAZINE 50 mg tablet; Commonly known as: Apresoline; Take 1 tablet   (50 mg) by mouth 2 times a day.   isosorbide mononitrate ER 60 mg 24 hr tablet; Commonly known as: Imdur;   Take 1 tablet (60 mg) by mouth once  "daily.   latanoprost 0.005 % ophthalmic solution; Commonly known as: Xalatan   lubricating eye drops ophthalmic solution   omeprazole 20 mg DR capsule; Commonly known as: PriLOSEC   pen needle, diabetic 32 gauge x 5/32\" needle; 1 Needle 4 times a day.   potassium chloride CR 10 mEq ER tablet; Commonly known as: Klor-Con;   TAKE 1 TABLET BY MOUTH TWICE A DAY WITH FOOD FOR 90 DAYS   timolol 0.5 % ophthalmic solution; Commonly known as: Timoptic   torsemide 100 mg tablet; Commonly known as: Demadex; Take 0.5 tablets   (50 mg) by mouth 2 times a day.   Trulicity 1.5 mg/0.5 mL pen injector injection; Generic drug:   dulaglutide; Inject 1.5 mg under the skin 1 (one) time per week. As   directed   VITAMIN B COMPLEX ORAL       Test Results Pending At Discharge  Pending Labs       Order Current Status    Extra Urine [place]ada Tube Collected (09/20/24 0035)    Urinalysis with Reflex Culture and Microscopic In process            Hospital Course   89-year-old female patient presented to the [facility] emergency department because of pain in the neck and shoulders.  She had had a fall the previous night while trying to retrieve a package in the kitchen.  She fell backwards and hit the back of her head.  She was able to get up with help from her granddaughters  who heard her calling for help.  She went to sleep and presented to the ED the following day with persistent pain.  CT scans of the head and cervical spine were unremarkable.  CT scans of the thoracic spine and lumbar spine showed T9, T10 and L2 fractures which appears to be old.  She was admitted to the hospital for observation because of inability to walk.  She was evaluated by physical therapy and low intensity level of care was recommended.  She is medically stable for discharge home.  The plan was to discharge from the hospital with medical transport to help ensure that she was able to make it up to 4 steps at the entrance to her home.  He reported at the time of " admission that her Coumadin dose was being adjusted as an outpatient because of a supratherapeutic INR a few days earlier.  Her Coumadin was restarted in the hospital at a lower dose than previous.  After discharge, she was asked to  continue Coumadin at a dose of 6 mg every Monday Wednesday and Friday and 3 mg on other days and contact the anticoagulation clinic as soon as possible for further recommendations.  The time spent arranging and completing and documenting discharge was 35 minutes.     Outpatient Follow-Up  Future Appointments   Date Time Provider Department [place]   10/28/2024  3:30 PM David Helms MD [id number] [facility]   11/19/2024  3:15 PM Gene Fraser MD [id number] [facility]         Martínez Buckley MD

## 2024-09-22 NOTE — PROGRESS NOTES
"Shoshana Thomas is a 89 y.o. female on day 0 of admission presenting with Fall at home, initial encounter.    Subjective   She is awake and alert, no acute complaints.  Her granddaughter and granddaughter's  at the bedside.     Objective     Physical Exam  General: awake, alert, oriented, responsive  Cardiovascular: regular, normal S1 and S2  Lungs: good air entry bilaterally, clear to auscultation  Extremities: no peripheral cyanosis, no pedal edema  Neuro: alert, oriented x 3, no focal weakness      Last Recorded Vitals  Blood pressure 165/70, pulse 60, temperature 36.6 °C (97.9 °F), temperature source Oral, resp. rate 18, height 1.602 m (5' 3.07\"), weight 90.3 kg (199 lb 1.2 oz), SpO2 98%.  Intake/Output last 3 Shifts:  No intake/output data recorded.    Relevant Results  Lab Results   Component Value Date    WBC 8.0 09/22/2024    HGB 10.8 (L) 09/22/2024    HCT 32.9 (L) 09/22/2024     09/22/2024     09/22/2024     Lab Results   Component Value Date    GLUCOSE 138 (H) 09/22/2024    CALCIUM 8.7 09/22/2024     09/22/2024    K 3.4 (L) 09/22/2024    CO2 30 09/22/2024     09/22/2024    BUN 21 09/22/2024    CREATININE 1.71 (H) 09/22/2024     Scheduled medications  atorvastatin, 20 mg, oral, Daily  calcitriol, 0.25 mcg, oral, Daily  carvedilol, 3.125 mg, oral, BID after meals  ferrous gluconate, 38 mg of iron, oral, Every other day  hydrALAZINE, 50 mg, oral, BID  insulin glargine, 10 Units, subcutaneous, q AM  insulin regular, 0-10 Units, subcutaneous, TID  isosorbide mononitrate ER, 60 mg, oral, Daily  latanoprost, 1 drop, ophthalmic (eye), Nightly  pantoprazole, 40 mg, oral, Daily before breakfast  polyethylene glycol, 17 g, oral, Daily  potassium chloride CR, 10 mEq, oral, BID after meals  timolol, 1 drop, Both Eyes, q AM  torsemide, 50 mg, oral, BID  warfarin, 3 mg, oral, Daily      Continuous medications     PRN medications  PRN medications: acetaminophen **OR** acetaminophen " **OR** acetaminophen, dextrose, dextrose, glucagon, glucagon, melatonin, ondansetron ODT **OR** ondansetron, traMADol         Assessment/Plan   Assessment & Plan    Fall  Mechanical fall with neck and shoulder pain  Improved  Pain medication as needed     Chronic kidney disease  Appears to be at baseline     Chronic atrial fibrillation  Heart rate is controlled    Long-term use of anticoagulant  On Coumadin for atrial fibrillation.  Monitor INR     Chronic diastolic heart failure  Stable     Hypokalemia  Continue oral potassium chloride     Type 2 diabetes mellitus with hyperglycemia  Continue Lantus  Humalog sliding scale  Hemoglobin A1c was 7.2% on 6/26    Plan  Her Coumadin regimen was in the process of being modified as an outpatient because she had a recent supratherapeutic INR of 4.5 per her report.  Continue Coumadin 3 mg daily and monitor INR which is currently subtherapeutic but slowly increasing. Follow up with anticoagulation clinic after discharge.   Awaiting follow-up from PT regarding climbing up steps, then discharge home with [facility].     Martínez Buckley MD

## 2024-09-22 NOTE — DISCHARGE INSTRUCTIONS
Call the anticoagulation clinic as soon as possible for instructions regarding adjusting the dose of Coumadin.  You may take the dose prescribed at discharge until the clinic provides testing and instructions.

## 2024-09-22 NOTE — CARE PLAN
The patient's goals for the shift include get stronger    The clinical goals for the shift include pt/ot, safety      Problem: Diabetes  Goal: Achieve decreasing blood glucose levels by end of shift  Outcome: Progressing  Goal: Increase stability of blood glucose readings by end of shift  Outcome: Progressing  Goal: Decrease in ketones present in urine by end of shift  Outcome: Progressing  Goal: Maintain electrolyte levels within acceptable range throughout shift  Outcome: Progressing  Goal: Maintain glucose levels >70mg/dl to <250mg/dl throughout shift  Outcome: Progressing  Goal: No changes in neurological exam by end of shift  Outcome: Progressing  Goal: Learn about and adhere to nutrition recommendations by end of shift  Outcome: Progressing  Goal: Vital signs within normal range for age by end of shift  Outcome: Progressing  Goal: Increase self care and/or family involovement by end of shift  Outcome: Progressing  Goal: Receive DSME education by end of shift  Outcome: Progressing     Problem: Pain  Goal: Takes deep breaths with improved pain control throughout the shift  Outcome: Progressing  Goal: Turns in bed with improved pain control throughout the shift  Outcome: Progressing  Goal: Walks with improved pain control throughout the shift  Outcome: Progressing  Goal: Performs ADL's with improved pain control throughout shift  Outcome: Progressing  Goal: Participates in PT with improved pain control throughout the shift  Outcome: Progressing  Goal: Free from opioid side effects throughout the shift  Outcome: Progressing  Goal: Free from acute confusion related to pain meds throughout the shift  Outcome: Progressing

## 2024-09-22 NOTE — HH CARE COORDINATION
Home Care received a Referral for Physical Therapy and Occupational Therapy. We have processed the referral for a Start of Care on 9/24/24.     If you have any questions or concerns, please feel free to contact us at [phone]. Follow the prompts, enter your five digit zip code, and you will be directed to your care team on EAST 1.

## 2024-09-22 NOTE — NURSING NOTE
Attempted to call patients grandson and his wife to arrange discharge twice with no answer. Will attempt again later. Patient states she has a key to get in house but grandson who she lives with is at a show.

## 2024-09-22 NOTE — PROGRESS NOTES
09/22/24 1254   Discharge Planning   Expected Discharge Disposition Home H     Patient is to be discharged to home today and have  Home Care. Dr. Buckley aware of need for internal home care referral.      Home Care updated to the above, and will process referral once order is received and then advise regarding start of care.    2:21 PM   Home Care processed referral and insurance. They advised of SOC of 9/24.

## 2024-09-22 NOTE — CARE PLAN
The patient's goals for the shift include get stronger    The clinical goals for the shift include Rest

## 2024-09-23 ENCOUNTER — PATIENT OUTREACH (OUTPATIENT)
Dept: PRIMARY CARE | Facility: CLINIC | Age: 89
End: 2024-09-23
Payer: MEDICARE

## 2024-09-23 NOTE — PROGRESS NOTES
Discharge facility: [facility]    Discharge diagnosis:  Fall at home, initial encounter  Chronic kidney disease  Chronic atrial fibrillation  Long-term use of anticoagulants  Chronic diastolic heart failure  Type 2 diabetes mellitus with hyperglycemia    Admission date: 9/19/24  Discharge date: 9/22/24    PCP Appointment Date: No available appointments within 14 days/ message to office   Specialist Appointment Date: 10/28/24  Hospital Encounter and Summary: Linked    See Discharge assessment below for further details    Medications  Medications reviewed with patient/caregiver?: Yes (9/23/2024  2:18 PM)  Is the patient having any side effects they believe may be caused by any medication additions or changes?: No (9/23/2024  2:18 PM)  Does the patient have all medications ordered at discharge?: No (9/23/2024  2:18 PM)  What is keeping the patient from filling the prescriptions?: Patient desires to consult PCP (Patient reports Dr Helms told her not to take insulin lispro 100 unit/mL injection  Commonly known as: HumaLOG KwikPen Insulin  Up to 30 units daily As directed) (9/23/2024  2:18 PM)  Care Management Interventions: Provided patient education (9/23/2024  2:18 PM)  Prescription Comments: Patient denies any new prescriptions sent (9/23/2024  2:18 PM)  Care Management Interventions: Provided patient education (9/23/2024  2:18 PM)  Medication Comments: Instructed on medications changes of Basaglar KwikPen U-100 Insulin 100 unit/  mL (3 mL) pen  Inject 12 Units under the skin once daily in the  morning. As directed,warfarin 6 mg tablet  Commonly known as: Coumadin  Take as directed. If you are unsure how to take  this medication, talk to your nurse or doctor.  Original instructions: Take 1 tablet (6 mg) by  mouth see administration instructions. 1 tablet  every Mon, Wed, Fri, 1/2 tablet every Tue, Thu,  Sat, Sun  Last time th (9/23/2024  2:18 PM)  Follow Up Tasks: Medication reconciliation issues  (insulin lispro 100 unit/mL injection  Commonly known as: HumaLOG- Patient not taking) (9/23/2024  2:18 PM)    Appointments  Does the patient have a primary care provider?: Yes (9/23/2024  2:18 PM)  Care Management Interventions: Educated patient on importance of making appointment (Encouraged to call to schedule) (9/23/2024  2:18 PM)  Has the patient kept scheduled appointments due by today?: Yes (9/23/2024  2:18 PM)  Care Management Interventions: Educated on importance of keeping appointment (9/23/2024  2:18 PM)  Follow Up Tasks: -- (n/a) (9/23/2024  2:18 PM)    Self Management  What is the home health agency?:  Home Care (9/23/2024  2:18 PM)  Has home health visited the patient within 72 hours of discharge?: Call prior to 72 hours (9/23/2024  2:18 PM)  Home Health Interventions: -- (n/a) (9/23/2024  2:18 PM)  What Durable Medical Equipment (DME) was ordered?: n/a (9/23/2024  2:18 PM)  Has all Durable Medical Equipment (DME) been delivered?: -- (n/a) (9/23/2024  2:18 PM)  DME Interventions: -- (n/a) (9/23/2024  2:18 PM)  Care Management Interventions: Notified PCP/provider (9/23/2024  2:18 PM)  Follow Up Tasks: -- (n/a) (9/23/2024  2:18 PM)    Patient Teaching  Does the patient have access to their discharge instructions?: Yes (9/23/2024  2:18 PM)  Care Management Interventions: Reviewed instructions with patient (9/23/2024  2:18 PM)  What is the patient's perception of their health status since discharge?: Improving (9/23/2024  2:18 PM)  Is the patient/caregiver able to teach back the hierarchy of who to call/visit for symptoms/problems? PCP, Specialist, Home Health nurse, Urgent Care, ED, 911: Yes (9/23/2024  2:18 PM)  Patient/Caregiver Education Comments: Instructed on hospital discharge instructions. Instructed on new and changed medications. Instructed if increased shortness of breath or chest pains to call 911. Provided my contact information and encouraged to call with any questions. (9/23/2024  2:18  PM)    Wrap Up  Is the patient/caregiver familiar with Advance Care Planning?: Yes (9/23/2024  2:18 PM)  Would the patient like more information on Advance Care Planning?: No (9/23/2024  2:18 PM)  Wrap Up Additional Comments: Patient reports doing alright. She states she had no new medications added. She took  10 units ofBasaglar KwikPen U-100 Insulin 100 unit/  mL (3 mL) pen as she did not realize it was changed to 10 units. She states Dr Helms had told her stop insulin lispro 100 unit/mL injection  Commonly known as: HumaLOG KwikPen Insulin  Up to 30 units daily As directed. She reports she is still sore on her bottom where she landed when she fell. Encouraged pressure relief measures. She reports she is also planning to call to schedule to get appt to have her PT/INR checked. (9/23/2024  2:18 PM)

## 2024-09-24 ENCOUNTER — HOME CARE VISIT (OUTPATIENT)
Dept: HOME HEALTH SERVICES | Facility: HOME HEALTH | Age: 89
End: 2024-09-24
Payer: MEDICARE

## 2024-09-24 PROCEDURE — G0151 HHCP-SERV OF PT,EA 15 MIN: HCPCS | Mod: HHH

## 2024-09-24 PROCEDURE — 169592 NO-PAY CLAIM PROCEDURE

## 2024-09-24 ASSESSMENT — ENCOUNTER SYMPTOMS
SUBJECTIVE PAIN PROGRESSION: GRADUALLY IMPROVING
PAIN LOCATION: LEFT SHOULDER
PAIN LOCATION - PAIN SEVERITY: 9/10
PAIN LOCATION - PAIN SEVERITY: 6/10
HIGHEST PAIN SEVERITY IN PAST 24 HOURS: 10/10
PAIN: 1
PERSON REPORTING PAIN: PATIENT
PAIN LOCATION: COCCYX
LOWEST PAIN SEVERITY IN PAST 24 HOURS: 3/10
PAIN SEVERITY GOAL: 0/10
PAIN LOCATION - PAIN SEVERITY: 6/10
PAIN LOCATION: NECK

## 2024-09-24 ASSESSMENT — ACTIVITIES OF DAILY LIVING (ADL)
AMBULATION ASSISTANCE: 1
AMBULATION ASSISTANCE: MINIMUM ASSIST
ENTERING_EXITING_HOME: MINIMUM ASSIST
OASIS_M1830: 03

## 2024-09-24 NOTE — HOME HEALTH
Fell 9 18 24 at about 930pm, hit butt, left shoulder, neck and head.    I had my walker with me because my rotator cuffs, trying to reach fruit cup and I fell backwards, falling onto tailbone.  I could not get up once I am on the floor.  One time I could not get up from the rocking chair.   We finally called EMS and rescue took me after 330am 9 19 24.  Returned home 9 22 24.    Walker x years.    Provided patient with written copy of basic standing exercises but more time is certainly needed to instruct and get return demonstration for correct technique.

## 2024-09-25 ENCOUNTER — HOME CARE VISIT (OUTPATIENT)
Dept: HOME HEALTH SERVICES | Facility: HOME HEALTH | Age: 89
End: 2024-09-25
Payer: MEDICARE

## 2024-09-27 ENCOUNTER — ANTICOAGULATION - WARFARIN VISIT (OUTPATIENT)
Dept: CARDIOLOGY | Facility: CLINIC | Age: 89
End: 2024-09-27
Payer: MEDICARE

## 2024-09-27 DIAGNOSIS — I48.20 CHRONIC ATRIAL FIBRILLATION (MULTI): ICD-10-CM

## 2024-09-27 LAB
POC INR: 1.6
POC PROTHROMBIN TIME: NORMAL

## 2024-09-27 PROCEDURE — 85610 PROTHROMBIN TIME: CPT | Mod: QW

## 2024-09-27 PROCEDURE — 99211 OFF/OP EST MAY X REQ PHY/QHP: CPT | Performed by: INTERNAL MEDICINE

## 2024-09-27 NOTE — PROGRESS NOTES
Patient identification verified with 2 identifiers.    Location: [facility] - [address] [place] [address] [phone]     Referring Physician:  DR TAPIA  Enrollment/ Re-enrollment date: 7/3/2025  INR Goal: 2.0-3.0  INR monitoring is per [facility] protocol.  Anticoagulation Medication: warfarin  Indication: atrial fibrillation    Subjective   Bleeding signs/symptoms: No    Bruising: No  multiple bruises from fall   Major bleeding event: No  Thrombosis signs/symptoms: No  Thromboembolic event: No  Missed doses: No  had different dosage in hospital d/c instructions  Extra doses: No  Medication changes: No  Dietary changes: No  Change in health: No  Change in activity: No  Alcohol: No  Other concerns: No    Upcoming Surgeries:  Does the Patient Have any upcoming surgeries that require interruption in anticoagulation therapy? no  Does the patient require bridging? no      Anticoagulation Summary  As of 2024      INR goal:  2.0-3.0   TTR:  76.9% (11.8 mo)   INR used for dosin.60 (2024)   Weekly warfarin total:  36 mg               Assessment/Plan   SUb Therapeutic     1. New dose: increase  2. Next INR: 1 WEEK      Education provided to patient during the visit:  Patient instructed to call in interim with questions, concerns and changes.

## 2024-09-30 ENCOUNTER — HOME CARE VISIT (OUTPATIENT)
Dept: HOME HEALTH SERVICES | Facility: HOME HEALTH | Age: 89
End: 2024-09-30
Payer: MEDICARE

## 2024-09-30 DIAGNOSIS — I50.32 CHRONIC DIASTOLIC HEART FAILURE: Primary | ICD-10-CM

## 2024-09-30 DIAGNOSIS — Z79.4 TYPE 2 DIABETES MELLITUS WITH OTHER SPECIFIED COMPLICATION, WITH LONG-TERM CURRENT USE OF INSULIN: ICD-10-CM

## 2024-09-30 DIAGNOSIS — Z86.73 HISTORY OF ISCHEMIC STROKE: ICD-10-CM

## 2024-09-30 DIAGNOSIS — E11.69 TYPE 2 DIABETES MELLITUS WITH OTHER SPECIFIED COMPLICATION, WITH LONG-TERM CURRENT USE OF INSULIN: ICD-10-CM

## 2024-09-30 PROCEDURE — G0152 HHCP-SERV OF OT,EA 15 MIN: HCPCS | Mod: HHH

## 2024-09-30 ASSESSMENT — ENCOUNTER SYMPTOMS
PERSON REPORTING PAIN: PATIENT
HIGHEST PAIN SEVERITY IN PAST 24 HOURS: 8/10
SUBJECTIVE PAIN PROGRESSION: UNCHANGED
PAIN SEVERITY GOAL: 2/10
PAIN: 1
LOWEST PAIN SEVERITY IN PAST 24 HOURS: 8/10
PAIN LOCATION: COCCYX

## 2024-10-01 ENCOUNTER — HOME CARE VISIT (OUTPATIENT)
Dept: HOME HEALTH SERVICES | Facility: HOME HEALTH | Age: 89
End: 2024-10-01
Payer: MEDICARE

## 2024-10-01 VITALS
SYSTOLIC BLOOD PRESSURE: 128 MMHG | HEART RATE: 74 BPM | RESPIRATION RATE: 18 BRPM | OXYGEN SATURATION: 98 % | TEMPERATURE: 97.4 F | DIASTOLIC BLOOD PRESSURE: 70 MMHG

## 2024-10-01 PROCEDURE — G0157 HHC PT ASSISTANT EA 15: HCPCS | Mod: CQ,HHH

## 2024-10-01 ASSESSMENT — ENCOUNTER SYMPTOMS
PAIN LOCATION - PAIN FREQUENCY: INTERMITTENT
PAIN LOCATION - PAIN SEVERITY: 6/10
PAIN LOCATION - RELIEVING FACTORS: REST, POSITIONING
PERSON REPORTING PAIN: PATIENT
PAIN LOCATION - PAIN QUALITY: SHARP
PAIN LOCATION: COCCYX
PAIN LOCATION - EXACERBATING FACTORS: ACTIVTY
HIGHEST PAIN SEVERITY IN PAST 24 HOURS: 6/10
PAIN: 1
PAIN LOCATION - PAIN DURATION: INTERMITTENT
PAIN SEVERITY GOAL: 0/10
LOWEST PAIN SEVERITY IN PAST 24 HOURS: 3/10
SUBJECTIVE PAIN PROGRESSION: UNCHANGED

## 2024-10-03 ENCOUNTER — APPOINTMENT (OUTPATIENT)
Dept: PRIMARY CARE | Facility: CLINIC | Age: 89
End: 2024-10-03
Payer: MEDICARE

## 2024-10-08 ENCOUNTER — HOME CARE VISIT (OUTPATIENT)
Dept: HOME HEALTH SERVICES | Facility: HOME HEALTH | Age: 89
End: 2024-10-08
Payer: MEDICARE

## 2024-10-08 PROCEDURE — G0151 HHCP-SERV OF PT,EA 15 MIN: HCPCS | Mod: HHH

## 2024-10-08 ASSESSMENT — ENCOUNTER SYMPTOMS
PERSON REPORTING PAIN: PATIENT
LOWEST PAIN SEVERITY IN PAST 24 HOURS: 2/10
PAIN: 1
PAIN LOCATION - PAIN SEVERITY: 7/10
PAIN LOCATION - PAIN SEVERITY: 3/10
PAIN SEVERITY GOAL: 0/10
HIGHEST PAIN SEVERITY IN PAST 24 HOURS: 9/10
PAIN LOCATION: COCCYX
PAIN LOCATION: NECK

## 2024-10-08 ASSESSMENT — ACTIVITIES OF DAILY LIVING (ADL)
AMBULATION ASSISTANCE: 1
AMBULATION ASSISTANCE: INDEPENDENT

## 2024-10-08 NOTE — HOME HEALTH
I find it hard to do the standing exercises since my left leg is weak and I cannot rely too heavily on my right leg.  Therefore spoke with patient about performing the non-standing exercises in place of the standing so as not to fall off of the path of recovery.    25 years ago I fell in some dionicio bushes and hurt my leg and you know it is still not not better.  I fixed dinner last night.  I made my meatballs and it was really good.  I have 2 MD visits this week, one the following week and then a 4th doctor the week after that.  My whole calender is full for this month.    Previous history was recorded as follows:  Fell 9 18 24 at about 930pm, hit butt, left shoulder, neck and head. I had my walker with me because my rotator cuffs, trying to reach fruit cup and I fell backwards, falling onto tailbone. I could not get up once I am on the floor. One time I could not get up from the rocking chair. We finally called EMS and rescue took me after 330am 9 19 24. Returned home 9 22 24.    TUG 28 seconds.   This is improved from 32 seconds upon my first visit.    Patient challenged to walk as far as she is able with her chosen device, a wheeled walker.  Patient walked about 300 feet with wheeled walker over 2 minutes 46 seconds.  Talked about attempting to inccreased distance but also time for upright activitiy.    Then challenged patient to perform exercises of seated  1.  Ankle pumps  2.  Full arc quads  3.  Gluteal sets  4.  Hip flexion or marching  while I timed her with my cell phone timer for 2 minutes each.   She knows she is free to do these for 20 repetitions also.  Patient has a written copy of standing exercises of hip flexion, hip abduction, knee flexion and hip extension but reported that she does not tolerate upright exercises because of the weakness of the left leg (due to 25 years ago fall into dionicio bush) and remote (5 yo) meningitis infection affecting the right leg.    Patient challenged to walk a  second time with her wheeled walker.  This time she walked about 300 feet over 3 minutes, 9 seconds.   Again we discussed attempting to improve her walking time but also perhaps distance and she demonstrated understanding.

## 2024-10-09 ENCOUNTER — PATIENT OUTREACH (OUTPATIENT)
Dept: PRIMARY CARE | Facility: CLINIC | Age: 89
End: 2024-10-09
Payer: MEDICARE

## 2024-10-09 NOTE — PROGRESS NOTES
Follow up call after hospitalization.  At time of outreach call the patient feels as if their condition is improving. She continues to have a sore tailbone since her fall. She thinks she may have overdone her exercises yesterday. She has PCP appt scheduled for tomorrow. Discussed CCM services. She does not fel she needs it at this time as her daughter is residing with her for now. Informed her it is available anytime. Addressed any questions or concerns.

## 2024-10-10 ENCOUNTER — OFFICE VISIT (OUTPATIENT)
Dept: PRIMARY CARE | Facility: CLINIC | Age: 89
End: 2024-10-10
Payer: MEDICARE

## 2024-10-10 ENCOUNTER — HOME CARE VISIT (OUTPATIENT)
Dept: HOME HEALTH SERVICES | Facility: HOME HEALTH | Age: 89
End: 2024-10-10
Payer: MEDICARE

## 2024-10-10 VITALS
OXYGEN SATURATION: 100 % | SYSTOLIC BLOOD PRESSURE: 127 MMHG | HEIGHT: 63 IN | HEART RATE: 57 BPM | BODY MASS INDEX: 35.26 KG/M2 | WEIGHT: 199 LBS | DIASTOLIC BLOOD PRESSURE: 73 MMHG | TEMPERATURE: 97.2 F

## 2024-10-10 DIAGNOSIS — I25.10 CORONARY ARTERY DISEASE INVOLVING NATIVE CORONARY ARTERY OF NATIVE HEART WITHOUT ANGINA PECTORIS: ICD-10-CM

## 2024-10-10 DIAGNOSIS — I50.32 CHRONIC DIASTOLIC HEART FAILURE: ICD-10-CM

## 2024-10-10 DIAGNOSIS — D64.9 ANEMIA, UNSPECIFIED TYPE: ICD-10-CM

## 2024-10-10 DIAGNOSIS — Z79.4 TYPE 2 DIABETES MELLITUS WITH HYPERGLYCEMIA, WITH LONG-TERM CURRENT USE OF INSULIN: ICD-10-CM
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
## 2024-10-10 DIAGNOSIS — E55.9 VITAMIN D DEFICIENCY: ICD-10-CM

## 2024-10-10 DIAGNOSIS — E03.8 SUBCLINICAL HYPOTHYROIDISM: ICD-10-CM

## 2024-10-10 DIAGNOSIS — M81.0 AGE-RELATED OSTEOPOROSIS WITHOUT CURRENT PATHOLOGICAL FRACTURE: ICD-10-CM

## 2024-10-10 DIAGNOSIS — I67.9 CEREBROVASCULAR DISEASE: ICD-10-CM

## 2024-10-10 DIAGNOSIS — K21.9 GASTROESOPHAGEAL REFLUX DISEASE WITHOUT ESOPHAGITIS: ICD-10-CM

## 2024-10-10 DIAGNOSIS — I48.20 CHRONIC ATRIAL FIBRILLATION (MULTI): ICD-10-CM

## 2024-10-10 DIAGNOSIS — I10 PRIMARY HYPERTENSION: ICD-10-CM

## 2024-10-10 DIAGNOSIS — E11.3219 BACKGROUND DIABETIC MACULAR EDEMA WITH MILD NONPROLIFERATIVE RETINOPATHY ASSOCIATED WITH TYPE 2 DIABETES MELLITUS: ICD-10-CM

## 2024-10-10 DIAGNOSIS — E11.65 TYPE 2 DIABETES MELLITUS WITH HYPERGLYCEMIA, WITH LONG-TERM CURRENT USE OF INSULIN: ICD-10-CM

## 2024-10-10 DIAGNOSIS — N18.4 STAGE 4 CHRONIC KIDNEY DISEASE (MULTI): ICD-10-CM

## 2024-10-10 DIAGNOSIS — R53.81 PHYSICAL DEBILITY: ICD-10-CM

## 2024-10-10 DIAGNOSIS — E78.2 MIXED HYPERLIPIDEMIA: ICD-10-CM

## 2024-10-10 DIAGNOSIS — Z23 ENCOUNTER FOR IMMUNIZATION: ICD-10-CM
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
## 2024-10-10 PROCEDURE — 3074F SYST BP LT 130 MM HG: CPT | Performed by: STUDENT IN AN ORGANIZED HEALTH CARE EDUCATION/TRAINING PROGRAM

## 2024-10-10 PROCEDURE — 1159F MED LIST DOCD IN RCRD: CPT | Performed by: STUDENT IN AN ORGANIZED HEALTH CARE EDUCATION/TRAINING PROGRAM

## 2024-10-10 PROCEDURE — G2211 COMPLEX E/M VISIT ADD ON: HCPCS | Performed by: STUDENT IN AN ORGANIZED HEALTH CARE EDUCATION/TRAINING PROGRAM

## 2024-10-10 PROCEDURE — 90662 IIV NO PRSV INCREASED AG IM: CPT | Performed by: STUDENT IN AN ORGANIZED HEALTH CARE EDUCATION/TRAINING PROGRAM

## 2024-10-10 PROCEDURE — 1036F TOBACCO NON-USER: CPT | Performed by: STUDENT IN AN ORGANIZED HEALTH CARE EDUCATION/TRAINING PROGRAM

## 2024-10-10 PROCEDURE — 1157F ADVNC CARE PLAN IN RCRD: CPT | Performed by: STUDENT IN AN ORGANIZED HEALTH CARE EDUCATION/TRAINING PROGRAM

## 2024-10-10 PROCEDURE — 1160F RVW MEDS BY RX/DR IN RCRD: CPT | Performed by: STUDENT IN AN ORGANIZED HEALTH CARE EDUCATION/TRAINING PROGRAM

## 2024-10-10 PROCEDURE — 1126F AMNT PAIN NOTED NONE PRSNT: CPT | Performed by: STUDENT IN AN ORGANIZED HEALTH CARE EDUCATION/TRAINING PROGRAM

## 2024-10-10 PROCEDURE — 3078F DIAST BP <80 MM HG: CPT | Performed by: STUDENT IN AN ORGANIZED HEALTH CARE EDUCATION/TRAINING PROGRAM

## 2024-10-10 PROCEDURE — 99214 OFFICE O/P EST MOD 30 MIN: CPT | Performed by: STUDENT IN AN ORGANIZED HEALTH CARE EDUCATION/TRAINING PROGRAM

## 2024-10-10 PROCEDURE — 1111F DSCHRG MED/CURRENT MED MERGE: CPT | Performed by: STUDENT IN AN ORGANIZED HEALTH CARE EDUCATION/TRAINING PROGRAM

## 2024-10-10 ASSESSMENT — ENCOUNTER SYMPTOMS
LOSS OF SENSATION IN FEET: 1
OCCASIONAL FEELINGS OF UNSTEADINESS: 1
CONSTITUTIONAL NEGATIVE: 1
RESPIRATORY NEGATIVE: 1
GASTROINTESTINAL NEGATIVE: 1
CARDIOVASCULAR NEGATIVE: 1
DEPRESSION: 1

## 2024-10-10 ASSESSMENT — PAIN SCALES - GENERAL: PAINLEVEL: 0-NO PAIN

## 2024-10-10 NOTE — PROGRESS NOTES
[facility]: [facility]  PROGRESS NOTE      Shoshana Thomas is a 89 y.o. female that is presenting today for Follow-up (ED fu 9-19 thru 9-22 /From a fall).    Assessment/Plan   - Acute Care Visit. ER/Hospital follow-up. Patient initially admitted after a fall with subsequent inability to walk. All imaging negative. Hospital course relatively uncomplicated. Patient was discharged home on day 4. Patient now has home health care working with her for physical therapy.  - Since getting home, the patient denies any acute concerns. Has been making gradual improvements in her strength and her pain. Has not had further issues with falling. Notes some pain in both her tailbone as well as her neck, both intermittent and positional in nature.  - Discussed next best steps with the patient. She is already getting physical therapy. This was not an episode of syncope. She has all the supplies that she needs. Patient and family feel that she is improving. No further evaluation required at this time.   - Blood pressure at goal today.  - Encouraged continued dietary, exercise, and lifestyle modification.  - Significant medication and problem list reconciliation done today. Patient notes that she was told to ask by homecare about potential medications to help with pain. Discussed with her that, between her warfarin and her kidney function, that any options to treat pain would basically be an opiate / narcotic and would further increase her risk for falling.  - Patient did not require labwork for this appointment.  - Patient has already had labs ordered for their next appointment. Encouraged the patient to get this labwork done one week prior to the next appointment.    Diagnoses and all orders for this visit:  Fall, sequela  Physical debility  Background diabetic macular edema with mild nonproliferative retinopathy associated with type 2 diabetes mellitus  Coronary artery disease involving native coronary  "artery of native heart without angina pectoris  Type 2 diabetes mellitus with hyperglycemia, with long-term current use of insulin  Age-related osteoporosis without current pathological fracture  Gastroesophageal reflux disease without esophagitis  Stage 4 chronic kidney disease (Multi)  Chronic atrial fibrillation (Multi)  Chronic diastolic heart failure  Subclinical hypothyroidism  Anemia, unspecified type  Cerebrovascular disease  Mixed hyperlipidemia  Primary hypertension  Vitamin D deficiency    Current Outpatient Medications   Medication Instructions    acetaminophen (Tylenol) 325 mg tablet 1 tablet, oral, 3 times daily PRN    atorvastatin (LIPITOR) 20 mg, oral, Daily    Basaglar KwikPen U-100 Insulin 12 Units, subcutaneous, Every morning, As directed    blood sugar diagnostic (Accu-Chek SmartView Test Strip) strip USE 1 STRIP INTO VITRO, 3 TIMES A DAY-- E11.65    calcitriol (ROCALTROL) 0.25 mcg, oral, Daily    calcium carbonate-vitamin D3 500 mg-5 mcg (200 unit) tablet Os-Joselo 500 + D 500-200 MG-UNIT TABS Refills: 0   Active    carvedilol (COREG) 3.125 mg, oral, 2 times daily    ferrous gluconate (FERATE) 27 mg, oral, Every other day    hydrALAZINE (APRESOLINE) 50 mg, oral, 2 times daily    insulin lispro (HumaLOG KwikPen Insulin) 100 unit/mL injection Up to 30 units daily As directed    isosorbide mononitrate ER (IMDUR) 60 mg, oral, Daily    latanoprost (Xalatan) 0.005 % ophthalmic solution 1 drop, ophthalmic (eye), Nightly, Both eyes    lubricating eye drops (polyvinyl alcohol-povidon,PF,) ophthalmic solution 1 drop, Both Eyes, As needed    omeprazole (PRILOSEC) 20 mg, oral, Daily before breakfast, 30 minutes before    pen needle, diabetic 32 gauge x 5/32\" needle 1 Needle, miscellaneous, 4 times daily    potassium chloride CR 10 mEq ER tablet TAKE 1 TABLET BY MOUTH TWICE A DAY WITH FOOD FOR 90 DAYS    timolol (Timoptic) 0.5 % ophthalmic solution 1 drop, Both Eyes, Every morning, *NEED APPT FOR FUTURE " FILLS*<BR>    torsemide (DEMADEX) 50 mg, oral, 2 times daily    Trulicity 1.5 mg, subcutaneous, Once Weekly, As directed    VITAMIN B COMPLEX ORAL 1 tablet, oral, Daily, As directed    warfarin (COUMADIN) 6 mg, oral, See admin instructions, 1 tablet every Mon, Wed, Fri, 1/2 tablet every Tue, Thu, Sat, Sun     Subjective   - The patient otherwise feels well and denies any acute symptoms or concerns at this time.  - The patient denies any changes or progression of their chronic medical problems.  - The patient denies any problems or concerns with their medications.      Review of Systems   Constitutional: Negative.    Respiratory: Negative.     Cardiovascular: Negative.    Gastrointestinal: Negative.    All other systems reviewed and are negative.     Objective   Vitals:    10/10/24 1309   BP: 127/73   Pulse: 57   Temp: 36.2 °C (97.2 °F)   SpO2: 100%      Body mass index is 35.25 kg/m².  Physical Exam  Vitals and nursing note reviewed.   Constitutional:       General: She is not in acute distress.  Neck:      Vascular: No carotid bruit.   Cardiovascular:      Rate and Rhythm: Normal rate and regular rhythm.      Heart sounds: Normal heart sounds.   Pulmonary:      Effort: Pulmonary effort is normal.      Breath sounds: Normal breath sounds.   Musculoskeletal:         General: No swelling.   Neurological:      Mental Status: She is alert. Mental status is at baseline.   Psychiatric:         Mood and Affect: Mood normal.       Diagnostic Results   Lab Results   Component Value Date    GLUCOSE 138 (H) 09/22/2024    CALCIUM 8.7 09/22/2024     09/22/2024    K 3.4 (L) 09/22/2024    CO2 30 09/22/2024     09/22/2024    BUN 21 09/22/2024    CREATININE 1.71 (H) 09/22/2024     Lab Results   Component Value Date    ALT 14 09/19/2024    AST 18 09/19/2024    ALKPHOS 72 09/19/2024    BILITOT 0.8 09/19/2024     Lab Results   Component Value Date    WBC 8.0 09/22/2024    HGB 10.8 (L) 09/22/2024    HCT 32.9 (L) 09/22/2024  "    09/22/2024     09/22/2024     Lab Results   Component Value Date    CHOL 133 11/04/2022    CHOL 133 11/04/2022    CHOL 153 02/16/2022     Lab Results   Component Value Date    HDL 42 (L) 11/04/2022    HDL 42 (L) 11/04/2022    HDL 43 (L) 02/16/2022     Lab Results   Component Value Date    LDLCALC 75 11/04/2022    LDLCALC 75 11/04/2022    LDLCALC 91 02/16/2022     Lab Results   Component Value Date    TRIG 82 11/04/2022    TRIG 82 11/04/2022    TRIG 97 02/16/2022     No components found for: \"CHOLHDL\"  Lab Results   Component Value Date    HGBA1C 7.2 (A) 06/26/2024     Other labs not included in the list above were reviewed either before or during this encounter.    History    Past Medical History:   Diagnosis Date    Adrenal mass (Multi)     At risk for bleeding 09/07/2023    Atherosclerotic heart disease of native coronary artery without angina pectoris 09/07/2023    Cataract     Chronic atrial fibrillation (Multi) 09/07/2023    Chronic diastolic heart failure     Glaucoma     History of ischemic stroke 09/07/2023    Hyperlipidemia     Hypertension     Iron deficiency anemia     Mixed hyperlipidemia 09/07/2023    Syncope and collapse 09/07/2023    Type 2 diabetes mellitus     Vitamin B12 deficiency      Past Surgical History:   Procedure Laterality Date    MR HEAD ANGIO WO IV CONTRAST  06/14/2016    MR HEAD ANGIO WO IV CONTRAST [facility]     Family History   Problem Relation Name Age of Onset    Other (VAD) Mother      Diabetes Father      Other (Sepsis) Father      Other (DJD) Sister      Diabetes Sister      Factor V Leiden deficiency Sister      Other (CKD) Brother      Heart disease Other      Hypertension Other      Cancer Other      Stroke Other       Social History     Socioeconomic History    Marital status:      Spouse name: Not on file    Number of children: Not on file    Years of education: Not on file    Highest education level: Not on file   Occupational History    " Not on file   Tobacco Use    Smoking status: Never     Passive exposure: Never    Smokeless tobacco: Never   Vaping Use    Vaping status: Never Used   Substance and Sexual Activity    Alcohol use: Yes     Comment: very rarley\ Occasional    Drug use: Never    Sexual activity: Defer   Other Topics Concern    Not on file   Social History Narrative    Not on file     Social Determinants of Health     Financial Resource Strain: Low Risk  (9/19/2024)    Overall Financial Resource Strain (CARDIA)     Difficulty of Paying Living Expenses: Not hard at all   Food Insecurity: No Food Insecurity (9/19/2024)    Hunger Vital Sign     Worried About Running Out of Food in the Last Year: Never true     Ran Out of Food in the Last Year: Never true   Transportation Needs: No Transportation Needs (9/24/2024)    OASIS : Transportation     Lack of Transportation (Medical): No     Lack of Transportation (Non-Medical): No     Patient Unable or Declines to Respond: No   Physical Activity: Not on file   Stress: Not on file   Social Connections: Feeling Socially Integrated (9/24/2024)    OASIS : Social Isolation     Frequency of experiencing loneliness or isolation: Never   Intimate Partner Violence: Not At Risk (9/19/2024)    Humiliation, Afraid, Rape, and Kick questionnaire     Fear of Current or Ex-Partner: No     Emotionally Abused: No     Physically Abused: No     Sexually Abused: No   Housing Stability: Low Risk  (9/19/2024)    Housing Stability Vital Sign     Unable to Pay for Housing in the Last Year: No     Number of Times Moved in the Last Year: 0     Homeless in the Last Year: No     No Known Allergies  Current Outpatient Medications on File Prior to Visit   Medication Sig Dispense Refill    acetaminophen (Tylenol) 325 mg tablet Take 1 tablet (325 mg) by mouth 3 times a day as needed for mild pain (1 - 3).      atorvastatin (Lipitor) 20 mg tablet TAKE 1 TABLET BY MOUTH EVERY DAY 90 tablet 3    blood sugar diagnostic  "(Accu-Chek SmartView Test Strip) strip USE 1 STRIP INTO VITRO, 3 TIMES A DAY-- E11.65 300 strip 1    calcitriol (Rocaltrol) 0.25 mcg capsule Take 1 capsule (0.25 mcg) by mouth once daily.      calcium carbonate-vitamin D3 500 mg-5 mcg (200 unit) tablet Os-Joselo 500 + D 500-200 MG-UNIT TABS Refills: 0   Active      carvedilol (Coreg) 3.125 mg tablet Take 1 tablet (3.125 mg) by mouth 2 times a day. 180 tablet 3    dulaglutide (Trulicity) 1.5 mg/0.5 mL pen injector injection Inject 1.5 mg under the skin 1 (one) time per week. As directed 2 mL 1    ferrous gluconate (Ferate) 240 (27 Fe) MG tablet Take 1 tablet (27 mg) by mouth every other day. 45 tablet 1    hydrALAZINE (Apresoline) 50 mg tablet Take 1 tablet (50 mg) by mouth 2 times a day. 180 tablet 3    insulin glargine (Basaglar KwikPen U-100 Insulin) 100 unit/mL (3 mL) pen Inject 12 Units under the skin once daily in the morning. As directed      insulin lispro (HumaLOG KwikPen Insulin) 100 unit/mL injection Up to 30 units daily As directed (Patient taking differently: Inject 10 Units under the skin once daily in the morning. Up to 30 units daily As directed) 45 mL 3    isosorbide mononitrate ER (Imdur) 60 mg 24 hr tablet Take 1 tablet (60 mg) by mouth once daily. 90 tablet 3    latanoprost (Xalatan) 0.005 % ophthalmic solution Administer 1 drop into affected eye(s) once daily at bedtime. Both eyes      lubricating eye drops (polyvinyl alcohol-povidon,PF,) ophthalmic solution Administer 1 drop into both eyes if needed for dry eyes.      omeprazole (PriLOSEC) 20 mg DR capsule Take 1 capsule (20 mg) by mouth once daily in the morning. Take before meals. 30 minutes before      pen needle, diabetic 32 gauge x 5/32\" needle 1 Needle 4 times a day. 400 each 1    potassium chloride CR 10 mEq ER tablet TAKE 1 TABLET BY MOUTH TWICE A DAY WITH FOOD FOR 90 DAYS 180 tablet 3    timolol (Timoptic) 0.5 % ophthalmic solution Administer 1 drop into both eyes once daily in the " morning. *NEED APPT FOR FUTURE FILLS*      torsemide (Demadex) 100 mg tablet Take 0.5 tablets (50 mg) by mouth 2 times a day. 90 tablet 3    VITAMIN B COMPLEX ORAL Take 1 tablet by mouth once daily. As directed      warfarin (Coumadin) 6 mg tablet Take 1 tablet (6 mg) by mouth see administration instructions. 1 tablet every Mon, Wed, Fri, 1/2 tablet every Tue, Thu, Sat, Sun       No current facility-administered medications on file prior to visit.     Immunization History   Administered Date(s) Administered    Flu vaccine, quadrivalent, high-dose, preservative free, age 65y+ (FLUZONE) 11/08/2021, 11/11/2022, 10/20/2023    Flu vaccine, trivalent, preservative free, HIGH-DOSE, age 65y+ (Fluzone) 09/12/2013, 09/24/2014, 10/08/2015, 10/07/2016, 10/13/2017, 11/09/2018, 10/07/2019, 09/21/2020, 11/08/2021, 11/11/2022, 10/20/2023    Influenza Whole 11/07/2007    Influenza, seasonal, injectable 10/04/2010, 10/26/2011, 10/01/2012    Moderna SARS-CoV-2 Vaccination 02/05/2021, 03/06/2021, 01/13/2022    Novel influenza-H1N1-09, preservative-free 01/12/2010    Pneumococcal conjugate vaccine, 13-valent (PREVNAR 13) 11/20/2015    Pneumococcal polysaccharide vaccine, 23-valent, age 2 years and older (PNEUMOVAX 23) 11/29/2005, 12/23/2019     Patient's medical history was reviewed and updated either before or during this encounter.       Gene Fraser MD

## 2024-10-10 NOTE — PATIENT INSTRUCTIONS
- Acute Care Visit. ER/Hospital follow-up. Patient initially admitted after a fall with subsequent inability to walk. All imaging negative. Hospital course relatively uncomplicated. Patient was discharged home on day 4. Patient now has home health care working with her for physical therapy.  - Since getting home, the patient denies any acute concerns. Has been making gradual improvements in her strength and her pain. Has not had further issues with falling. Notes some pain in both her tailbone as well as her neck, both intermittent and positional in nature.  - Discussed next best steps with the patient. She is already getting physical therapy. This was not an episode of syncope. She has all the supplies that she needs. Patient and family feel that she is improving. No further evaluation required at this time.   - Blood pressure at goal today.  - Encouraged continued dietary, exercise, and lifestyle modification.  - Significant medication and problem list reconciliation done today. Patient notes that she was told to ask by homecare about potential medications to help with pain. Discussed with her that, between her warfarin and her kidney function, that any options to treat pain would basically be an opiate / narcotic and would further increase her risk for falling.  - Patient did not require labwork for this appointment.  - Patient has already had labs ordered for their next appointment. Encouraged the patient to get this labwork done one week prior to the next appointment.

## 2024-10-11 ENCOUNTER — ANTICOAGULATION - WARFARIN VISIT (OUTPATIENT)
Dept: CARDIOLOGY | Facility: CLINIC | Age: 89
End: 2024-10-11
Payer: MEDICARE

## 2024-10-11 DIAGNOSIS — I48.20 CHRONIC ATRIAL FIBRILLATION (MULTI): ICD-10-CM

## 2024-10-11 LAB
POC INR: 2.2
POC PROTHROMBIN TIME: NORMAL

## 2024-10-11 PROCEDURE — 85610 PROTHROMBIN TIME: CPT | Mod: QW

## 2024-10-11 NOTE — PROGRESS NOTES
Patient identification verified with 2 identifiers.    Location: [facility] - [address] [place] [address] [phone]     Referring Physician:  DR TAPIA  Enrollment/ Re-enrollment date: 7/3/2025  INR Goal: 2.0-3.0  INR monitoring is per [facility] protocol.  Anticoagulation Medication: warfarin  Indication: atrial fibrillation    Subjective   Bleeding signs/symptoms: No    Bruising: No   Major bleeding event: No  Thrombosis signs/symptoms: No  Thromboembolic event: No  Missed doses: No  Extra doses: No  Medication changes: No  Dietary changes: No  Change in health: No  Change in activity: No  Alcohol: No  Other concerns: No    Upcoming Surgeries:  Does the Patient Have any upcoming surgeries that require interruption in anticoagulation therapy? no  Does the patient require bridging? no      Anticoagulation Summary  As of 10/11/2024      INR goal:  2.0-3.0   TTR:  75.3% (1 y)   INR used for dosin.20 (10/11/2024)   Weekly warfarin total:  36 mg               Assessment/Plan   Therapeutic     1. New dose: no change  2. Next INR: 4 WEEKs      Education provided to patient during the visit:  Patient instructed to call in interim with questions, concerns and changes.

## 2024-10-14 ENCOUNTER — HOME CARE VISIT (OUTPATIENT)
Dept: HOME HEALTH SERVICES | Facility: HOME HEALTH | Age: 89
End: 2024-10-14
Payer: MEDICARE

## 2024-10-14 DIAGNOSIS — N18.4 CHRONIC KIDNEY DISEASE, STAGE 4 (SEVERE) (MULTI): Primary | ICD-10-CM

## 2024-10-14 DIAGNOSIS — N25.81 SECONDARY HYPERPARATHYROIDISM OF RENAL ORIGIN (MULTI): ICD-10-CM

## 2024-10-14 PROCEDURE — G0152 HHCP-SERV OF OT,EA 15 MIN: HCPCS | Mod: HHH

## 2024-10-14 ASSESSMENT — ENCOUNTER SYMPTOMS
PAIN: 1
PAIN LOCATION - PAIN SEVERITY: 3/10
LOWEST PAIN SEVERITY IN PAST 24 HOURS: 3/10
PAIN SEVERITY GOAL: 0/10
PERSON REPORTING PAIN: PATIENT
HIGHEST PAIN SEVERITY IN PAST 24 HOURS: 3/10
PAIN LOCATION: COCCYX
PAIN LOCATION - PAIN QUALITY: ACHING
PAIN LOCATION - PAIN FREQUENCY: INTERMITTENT
SUBJECTIVE PAIN PROGRESSION: GRADUALLY IMPROVING

## 2024-10-14 ASSESSMENT — ACTIVITIES OF DAILY LIVING (ADL)
AMBULATION ASSISTANCE: 1
AMBULATION ASSISTANCE: INDEPENDENT
TOILETING: INDEPENDENT
DRESSING_LB_CURRENT_FUNCTION: INDEPENDENT
DRESSING_UB_CURRENT_FUNCTION: INDEPENDENT
TOILETING: 1

## 2024-10-15 ENCOUNTER — HOME CARE VISIT (OUTPATIENT)
Dept: HOME HEALTH SERVICES | Facility: HOME HEALTH | Age: 89
End: 2024-10-15
Payer: MEDICARE

## 2024-10-15 PROCEDURE — G0151 HHCP-SERV OF PT,EA 15 MIN: HCPCS | Mod: HHH

## 2024-10-15 ASSESSMENT — ENCOUNTER SYMPTOMS
LOWEST PAIN SEVERITY IN PAST 24 HOURS: 0/10
PAIN LOCATION: RIGHT SHOULDER
HIGHEST PAIN SEVERITY IN PAST 24 HOURS: 2/10
PAIN LOCATION - PAIN SEVERITY: 2/10
PAIN SEVERITY GOAL: 0/10
PAIN LOCATION - PAIN SEVERITY: 2/10
PERSON REPORTING PAIN: PATIENT
PAIN: 1
PAIN LOCATION: COCCYX
PAIN LOCATION: LEFT SHOULDER
PAIN LOCATION - PAIN SEVERITY: 2/10
PAIN LOCATION - EXACERBATING FACTORS: SEATED EXERCISES
SUBJECTIVE PAIN PROGRESSION: GRADUALLY IMPROVING

## 2024-10-15 ASSESSMENT — ACTIVITIES OF DAILY LIVING (ADL)
AMBULATION ASSISTANCE: 1
AMBULATION ASSISTANCE: MINIMUM ASSIST

## 2024-10-15 NOTE — HOME HEALTH
I am getting around much better.  My tailbone was fine but then when I did the exercises and my tailbone was hurting but now it is improving too.          Previous history was recorded as follows: Fell 9 18 24 at about 930pm, hit butt, left shoulder, neck and head. I had my walker with me because my rotator cuffs, trying to reach fruit cup and I fell backwards, falling onto tailbone. I could not get up once I am on the floor. One time I could not get up from the rocking chair. We finally called EMS and rescue took me after 330am 9 19 24. Returned home 9 22 24.     TUG 26 seconds. This is improved from 32 seconds upon my first visit.     Patient challenged to walk as far as she is able with her chosen device, a wheeled walker. Patient walked about 400 feet with wheeled walker over 3 minutes 16 seconds.      Then challenged patient to perform exercises of seated   1. Ankle pumps   2. Full arc quads   3. Gluteal sets   4. Hip flexion or marching while I timed her with my cell phone timer for 2 minutes each.          She knows she is free to do these for 20 repetitions also. Patient has a written copy of standing exercises of hip flexion, hip abduction, knee flexion and hip extension but reported that she does not tolerate upright exercises because of the weakness of the left leg (due to 25 years ago fall into dionicio bush) and remote (7 yo) meningitis infection affecting the right leg.         Patient challenged to walk a second time with her wheeled walker. This time she walked about 300 feet over 2 minutes, 46 seconds. Again we discussed attempting to improve her walking time but also perhaps distance and she demonstrated understanding.

## 2024-10-21 DIAGNOSIS — I48.20 CHRONIC ATRIAL FIBRILLATION (MULTI): ICD-10-CM

## 2024-10-21 RX ORDER — WARFARIN 6 MG/1
TABLET ORAL
Qty: 96 TABLET | Refills: 1 | Status: SHIPPED | OUTPATIENT
Start: 2024-10-21

## 2024-10-22 ENCOUNTER — HOME CARE VISIT (OUTPATIENT)
Dept: HOME HEALTH SERVICES | Facility: HOME HEALTH | Age: 89
End: 2024-10-22
Payer: MEDICARE

## 2024-10-22 ENCOUNTER — PATIENT OUTREACH (OUTPATIENT)
Dept: PRIMARY CARE | Facility: CLINIC | Age: 89
End: 2024-10-22
Payer: MEDICARE

## 2024-10-22 PROCEDURE — G0151 HHCP-SERV OF PT,EA 15 MIN: HCPCS | Mod: HHH

## 2024-10-22 ASSESSMENT — ENCOUNTER SYMPTOMS
PAIN LOCATION: LEFT HIP
PAIN LOCATION - PAIN QUALITY: ACHING
PAIN LOCATION: COCCYX
PAIN LOCATION - PAIN SEVERITY: 7/10
PAIN LOCATION - PAIN SEVERITY: 7/10
LOWEST PAIN SEVERITY IN PAST 24 HOURS: 6/10
HIGHEST PAIN SEVERITY IN PAST 24 HOURS: 7/10
PAIN LOCATION - PAIN QUALITY: ACHING
PAIN LOCATION - PAIN QUALITY: ACHING
PAIN LOCATION - PAIN SEVERITY: 7/10
PAIN LOCATION - PAIN QUALITY: ACHING
PAIN LOCATION: RIGHT LEG
PAIN LOCATION: LEFT LEG
PAIN LOCATION - PAIN SEVERITY: 7/10
PAIN LOCATION - PAIN QUALITY: ACHING
PERSON REPORTING PAIN: PATIENT
PAIN: 1
PAIN LOCATION: RIGHT HIP
PAIN LOCATION - PAIN SEVERITY: 7/10
PAIN SEVERITY GOAL: 0/10

## 2024-10-22 ASSESSMENT — ACTIVITIES OF DAILY LIVING (ADL)
OASIS_M1830: 01
HOME_HEALTH_OASIS: 01
AMBULATION ASSISTANCE: 1
AMBULATION ASSISTANCE: MINIMUM ASSIST

## 2024-10-22 NOTE — HOME HEALTH
Soreness to buttocks and behind my thighs.  Previous history was recorded as follows: Fell 9 18 24 at about 930pm, hit butt, left shoulder, neck and head. I had my walker with me because my rotator cuffs, trying to reach fruit cup and I fell backwards, falling onto tailbone. I could not get up once I am on the floor. One time I could not get up from the rocking chair. We finally called EMS and rescue took me after 330am 9 19 24. Returned home 9 22 24.     TUG 98 seconds.          This is decline in function (within the test) of about 70 seconds.  Patient challenged to walk as far as she is able with her chosen device, a wheeled walker. She asked to skip that today, citing the increased pain recently but also indicating that she fully expects to get back to walking and exercises in the manner that she did recently. Then challenged patient to perform exercises of seated   1. Ankle pumps   2. Full arc quads   3. Gluteal sets   4. Hip flexion or marching while I timed her with my cell phone timer for 2 minutes each. She knows she is free to do these for 20 repetitions also.  Today she did a limited amount of these, again, citing the pain to her hips, knees and coccyx.          Patient has a written copy of standing exercises of hip flexion, hip abduction, knee flexion and hip extension but reported that she does not tolerate upright exercises because of the weakness of the left leg (due to 25 years ago fall into dionicio bush) and remote (5 yo) meningitis infection affecting the right leg.

## 2024-10-22 NOTE — PROGRESS NOTES
Call placed regarding one month post discharge follow up call. At time of outreach call the patient feels as if their condition has improved. She reports she has completed home care therapy as of today. She still has some pain but feels it is because she had done too much therapy.   Discussed CCM services with patient, she does not feel she needs it at this time as her granddaughter and her  reside with her and assist.Pt denies questions or concerns at this time. Pt aware of my availability for non-emergent concerns. Contact info provided to patient.

## 2024-10-24 ENCOUNTER — APPOINTMENT (OUTPATIENT)
Dept: CARDIOLOGY | Facility: CLINIC | Age: 89
End: 2024-10-24
Payer: MEDICARE

## 2024-10-24 VITALS — HEART RATE: 82 BPM | SYSTOLIC BLOOD PRESSURE: 132 MMHG | DIASTOLIC BLOOD PRESSURE: 66 MMHG | OXYGEN SATURATION: 97 %

## 2024-10-24 DIAGNOSIS — I25.10 CORONARY ARTERY DISEASE INVOLVING NATIVE CORONARY ARTERY OF NATIVE HEART WITHOUT ANGINA PECTORIS: ICD-10-CM

## 2024-10-24 DIAGNOSIS — I50.30 DIASTOLIC HEART FAILURE, UNSPECIFIED HF CHRONICITY: ICD-10-CM

## 2024-10-24 DIAGNOSIS — I10 PRIMARY HYPERTENSION: ICD-10-CM

## 2024-10-24 DIAGNOSIS — I48.0 PAROXYSMAL ATRIAL FIBRILLATION (MULTI): Primary | ICD-10-CM

## 2024-10-24 DIAGNOSIS — E78.2 MIXED HYPERLIPIDEMIA: ICD-10-CM

## 2024-10-24 PROCEDURE — 99214 OFFICE O/P EST MOD 30 MIN: CPT | Performed by: INTERNAL MEDICINE

## 2024-10-24 PROCEDURE — 1125F AMNT PAIN NOTED PAIN PRSNT: CPT | Performed by: INTERNAL MEDICINE

## 2024-10-24 PROCEDURE — 1159F MED LIST DOCD IN RCRD: CPT | Performed by: INTERNAL MEDICINE

## 2024-10-24 PROCEDURE — 1157F ADVNC CARE PLAN IN RCRD: CPT | Performed by: INTERNAL MEDICINE

## 2024-10-24 PROCEDURE — 3078F DIAST BP <80 MM HG: CPT | Performed by: INTERNAL MEDICINE

## 2024-10-24 PROCEDURE — 3075F SYST BP GE 130 - 139MM HG: CPT | Performed by: INTERNAL MEDICINE

## 2024-10-24 PROCEDURE — 1160F RVW MEDS BY RX/DR IN RCRD: CPT | Performed by: INTERNAL MEDICINE

## 2024-10-24 PROCEDURE — 1036F TOBACCO NON-USER: CPT | Performed by: INTERNAL MEDICINE

## 2024-10-24 ASSESSMENT — ENCOUNTER SYMPTOMS
OCCASIONAL FEELINGS OF UNSTEADINESS: 0
LOSS OF SENSATION IN FEET: 0
DEPRESSION: 0

## 2024-10-24 ASSESSMENT — PATIENT HEALTH QUESTIONNAIRE - PHQ9
1. LITTLE INTEREST OR PLEASURE IN DOING THINGS: NOT AT ALL
2. FEELING DOWN, DEPRESSED OR HOPELESS: NOT AT ALL
SUM OF ALL RESPONSES TO PHQ9 QUESTIONS 1 AND 2: 0

## 2024-10-24 ASSESSMENT — PAIN SCALES - GENERAL: PAINLEVEL_OUTOF10: 8

## 2024-10-24 NOTE — PROGRESS NOTES
History of present illness:  89 year old female patient here today following up on hx of HFpEF and AFIB status post LINQ placement in 08/2015, patient also has hx of CKD Stage III. 2D. Nuclear stress test in 08/2017 showed mild to moderate anterior wall ischemia.  Echo in 2021 showed mild aortic regurgitation and mitral regurgitation with normal ejection fraction.  Patient returns to my office for follow-up.  Overall has been stable denies having any chest pain or shortness of breath.  Using wheelchair most of the time    Past Medical History:   Diagnosis Date    Adrenal mass (Multi)     At risk for bleeding 09/07/2023    Atherosclerotic heart disease of native coronary artery without angina pectoris 09/07/2023    Cataract     Chronic atrial fibrillation (Multi) 09/07/2023    Chronic diastolic heart failure     Glaucoma     History of ischemic stroke 09/07/2023    Hyperlipidemia     Hypertension     Iron deficiency anemia     Mixed hyperlipidemia 09/07/2023    Syncope and collapse 09/07/2023    Type 2 diabetes mellitus     Vitamin B12 deficiency        Past Surgical History:   Procedure Laterality Date    MR HEAD ANGIO WO IV CONTRAST  06/14/2016    MR HEAD ANGIO WO IV CONTRAST [facility]       No Known Allergies     reports that she has never smoked. She has never been exposed to tobacco smoke. She has never used smokeless tobacco. She reports current alcohol use. She reports that she does not use drugs.    Family History   Problem Relation Name Age of Onset    Other (VAD) Mother      Diabetes Father      Other (Sepsis) Father      Other (DJD) Sister      Diabetes Sister      Factor V Leiden deficiency Sister      Other (CKD) Brother      Heart disease Other      Hypertension Other      Cancer Other      Stroke Other         Patient's Medications   New Prescriptions    No medications on file   Previous Medications    ACETAMINOPHEN (TYLENOL) 325 MG TABLET    Take 1 tablet (325 mg) by mouth 3 times a day as  "needed for mild pain (1 - 3).    ATORVASTATIN (LIPITOR) 20 MG TABLET    TAKE 1 TABLET BY MOUTH EVERY DAY    BLOOD SUGAR DIAGNOSTIC (ACCU-CHEK SMARTVIEW TEST STRIP) STRIP    USE 1 STRIP INTO VITRO, 3 TIMES A DAY-- E11.65    CALCITRIOL (ROCALTROL) 0.25 MCG CAPSULE    Take 1 capsule (0.25 mcg) by mouth once daily.    CALCIUM CARBONATE-VITAMIN D3 500 MG-5 MCG (200 UNIT) TABLET    Os-Joselo 500 + D 500-200 MG-UNIT TABS Refills: 0   Active    CARVEDILOL (COREG) 3.125 MG TABLET    Take 1 tablet (3.125 mg) by mouth 2 times a day.    DULAGLUTIDE (TRULICITY) 1.5 MG/0.5 ML PEN INJECTOR INJECTION    Inject 1.5 mg under the skin 1 (one) time per week. As directed    FERROUS GLUCONATE (FERATE) 240 (27 FE) MG TABLET    Take 1 tablet (27 mg) by mouth every other day.    HYDRALAZINE (APRESOLINE) 50 MG TABLET    Take 1 tablet (50 mg) by mouth 2 times a day.    INSULIN GLARGINE (BASAGLAR KWIKPEN U-100 INSULIN) 100 UNIT/ML (3 ML) PEN    Inject 12 Units under the skin once daily in the morning. As directed    INSULIN LISPRO (HUMALOG KWIKPEN INSULIN) 100 UNIT/ML INJECTION    Up to 30 units daily As directed    ISOSORBIDE MONONITRATE ER (IMDUR) 60 MG 24 HR TABLET    Take 1 tablet (60 mg) by mouth once daily.    LATANOPROST (XALATAN) 0.005 % OPHTHALMIC SOLUTION    Administer 1 drop into affected eye(s) once daily at bedtime. Both eyes    LUBRICATING EYE DROPS (POLYVINYL ALCOHOL-POVIDON,PF,) OPHTHALMIC SOLUTION    Administer 1 drop into both eyes if needed for dry eyes.    OMEPRAZOLE (PRILOSEC) 20 MG DR CAPSULE    Take 1 capsule (20 mg) by mouth once daily in the morning. Take before meals. 30 minutes before    PEN NEEDLE, DIABETIC 32 GAUGE X 5/32\" NEEDLE    1 Needle 4 times a day.    POTASSIUM CHLORIDE CR 10 MEQ ER TABLET    TAKE 1 TABLET BY MOUTH TWICE A DAY WITH FOOD FOR 90 DAYS    TIMOLOL (TIMOPTIC) 0.5 % OPHTHALMIC SOLUTION    Administer 1 drop into both eyes once daily in the morning. *NEED APPT FOR FUTURE FILLS*    TORSEMIDE (DEMADEX) " 100 MG TABLET    Take 0.5 tablets (50 mg) by mouth 2 times a day.    VITAMIN B COMPLEX ORAL    Take 1 tablet by mouth once daily. As directed    WARFARIN (COUMADIN) 6 MG TABLET    TAKE 1 TABLET (6 MG) BY MOUTH SEE ADMINISTRATION INSTRUCTIONS. 1 1/2 FRIDAY 6 MG REST OF WK   Modified Medications    No medications on file   Discontinued Medications    No medications on file       Objective   Physical Exam  General: Patient in no acute distress   HEENT: Atraumatic normocephalic.  Neck: Supple, jugular venous pressure within normal limit.  No bruits  Lungs: Clear to auscultation bilaterally  Cardiovascular: Regular rate and rhythm, normal heart sounds, no murmurs rubs or gallops  Abdomen: Soft nontender nondistended.  Normal bowel sounds.  Extremities: Warm to touch, no edema.      Lab Review   Anticoagulation - Warfarin Visit on 10/11/2024   Component Date Value    POC INR 10/11/2024 2.20    Anticoagulation - Warfarin Visit on 09/27/2024   Component Date Value    POC INR 09/27/2024 1.60    Admission on 09/19/2024, Discharged on 09/22/2024   Component Date Value    Ventricular Rate 09/19/2024 64     Atrial Rate 09/19/2024 178     QRS Duration 09/19/2024 80     QT Interval 09/19/2024 434     QTC Calculation(Bazett) 09/19/2024 447     R Axis 09/19/2024 -2     T [place] 09/19/2024 14     QRS Count 09/19/2024 10     Q Onset 09/19/2024 226     T Offset 09/19/2024 443     QTC [place] 09/19/2024 443     WBC 09/19/2024 9.2     nRBC 09/19/2024 0.0     RBC 09/19/2024 3.98 (L)     Hemoglobin 09/19/2024 13.0     Hematocrit 09/19/2024 39.1     MCV 09/19/2024 98     MCH 09/19/2024 32.7     MCHC 09/19/2024 33.2     RDW 09/19/2024 14.0     Platelets 09/19/2024 211     Neutrophils % 09/19/2024 54.9     Immature Granulocytes %,* 09/19/2024 0.3     Lymphocytes % 09/19/2024 34.3     Monocytes % 09/19/2024 8.4     Eosinophils % 09/19/2024 1.4     Basophils % 09/19/2024 0.7     Neutrophils Absolute 09/19/2024 5.03     Immature Granulocytes  Ab* 09/19/2024 0.03     Lymphocytes Absolute 09/19/2024 3.14 (H)     Monocytes Absolute 09/19/2024 0.77     Eosinophils Absolute 09/19/2024 0.13     Basophils Absolute 09/19/2024 0.06     Magnesium 09/19/2024 1.86     Glucose 09/19/2024 155 (H)     Sodium 09/19/2024 143     Potassium 09/19/2024 3.3 (L)     Chloride 09/19/2024 102     Bicarbonate 09/19/2024 30     Anion Gap 09/19/2024 14     Urea Nitrogen 09/19/2024 21     Creatinine 09/19/2024 1.61 (H)     eGFR 09/19/2024 30 (L)     Calcium 09/19/2024 9.5     Albumin 09/19/2024 3.5     Alkaline Phosphatase 09/19/2024 72     Total Protein 09/19/2024 7.5     AST 09/19/2024 18     Bilirubin, Total 09/19/2024 0.8     ALT 09/19/2024 14     POCT pH, Venous 09/19/2024 7.62 (HH)     POCT pCO2, Venous 09/19/2024 36 (L)     POCT pO2, Venous 09/19/2024 37     POCT SO2, Venous 09/19/2024 43 (L)     POCT Oxy Hemoglobin, [place]* 09/19/2024 42.7 (L)     POCT Hematocrit Calculat* 09/19/2024 47.0 (H)     POCT Sodium, Venous 09/19/2024 141     POCT Potassium, Venous 09/19/2024 3.3 (L)     POCT Chloride, Venous 09/19/2024 103     POCT Ionized Calicum, Ve* 09/19/2024 1.19     POCT Glucose, Venous 09/19/2024 172 (H)     POCT Lactate, Venous 09/19/2024 2.4 (H)     POCT Base Excess, Venous 09/19/2024 14.7 (H)     POCT HCO3 Calculated, Ve* 09/19/2024 37.0 (H)     POCT Hemoglobin, Venous 09/19/2024 15.6     POCT Anion Gap, Venous 09/19/2024 4.0 (L)     Patient Temperature 09/19/2024 37.0     FiO2 09/19/2024 0     BNP 09/19/2024 98     Color, Urine 09/20/2024 Yellow     Appearance, Urine 09/20/2024 Clear     Specific Gravity, Urine 09/20/2024 1.028     pH, Urine 09/20/2024 5.5     Protein, Urine 09/20/2024 NEGATIVE     Glucose, Urine 09/20/2024 Normal     Blood, Urine 09/20/2024 NEGATIVE     Ketones, Urine 09/20/2024 NEGATIVE     Bilirubin, Urine 09/20/2024 NEGATIVE     Urobilinogen, Urine 09/20/2024 Normal     Nitrite, Urine 09/20/2024 NEGATIVE     Leukocyte Esterase, Urine 09/20/2024 NEGATIVE      Troponin I, High Sensiti* 09/19/2024 23 (H)     Troponin I, High Sensiti* 09/19/2024 21 (H)     Protime 09/19/2024 15.6 (H)     INR 09/19/2024 1.5 (H)     aPTT 09/19/2024 27.7     POCT Lactate, Venous 09/19/2024 1.9     POCT Glucose 09/19/2024 113 (H)     POCT Glucose 09/19/2024 209 (H)     Glucose 09/20/2024 136 (H)     Sodium 09/20/2024 142     Potassium 09/20/2024 3.3 (L)     Chloride 09/20/2024 104     Bicarbonate 09/20/2024 30     Anion Gap 09/20/2024 11     Urea Nitrogen 09/20/2024 23     Creatinine 09/20/2024 1.77 (H)     eGFR 09/20/2024 27 (L)     Calcium 09/20/2024 8.5 (L)     WBC 09/20/2024 8.7     nRBC 09/20/2024 0.0     RBC 09/20/2024 3.42 (L)     Hemoglobin 09/20/2024 11.1 (L)     Hematocrit 09/20/2024 34.3 (L)     MCV 09/20/2024 100     MCH 09/20/2024 32.5     MCHC 09/20/2024 32.4     RDW 09/20/2024 14.3     Platelets 09/20/2024 185     POCT Glucose 09/19/2024 177 (H)     POCT Glucose 09/20/2024 125 (H)     POCT Glucose 09/20/2024 242 (H)     Protime 09/20/2024 15.3 (H)     INR 09/20/2024 1.5 (H)     POCT Glucose 09/20/2024 181 (H)     Protime 09/21/2024 16.9 (H)     INR 09/21/2024 1.7 (H)     Glucose 09/21/2024 145 (H)     Sodium 09/21/2024 142     Potassium 09/21/2024 4.1     Chloride 09/21/2024 104     Bicarbonate 09/21/2024 29     Anion Gap 09/21/2024 13     Urea Nitrogen 09/21/2024 22     Creatinine 09/21/2024 1.75 (H)     eGFR 09/21/2024 28 (L)     Calcium 09/21/2024 8.7     WBC 09/21/2024 8.8     nRBC 09/21/2024 0.0     RBC 09/21/2024 3.57 (L)     Hemoglobin 09/21/2024 11.8 (L)     Hematocrit 09/21/2024 36.9     MCV 09/21/2024 103 (H)     MCH 09/21/2024 33.1     MCHC 09/21/2024 32.0     RDW 09/21/2024 14.2     Platelets 09/21/2024 154     POCT Glucose 09/20/2024 190 (H)     POCT Glucose 09/21/2024 146 (H)     POCT Glucose 09/21/2024 267 (H)     POCT Glucose 09/21/2024 213 (H)     Protime 09/22/2024 17.5 (H)     INR 09/22/2024 1.8 (H)     Glucose 09/22/2024 138 (H)     Sodium 09/22/2024 142      Potassium 09/22/2024 3.4 (L)     Chloride 09/22/2024 104     Bicarbonate 09/22/2024 30     Anion Gap 09/22/2024 11     Urea Nitrogen 09/22/2024 21     Creatinine 09/22/2024 1.71 (H)     eGFR 09/22/2024 28 (L)     Calcium 09/22/2024 8.7     WBC 09/22/2024 8.0     nRBC 09/22/2024 0.0     RBC 09/22/2024 3.28 (L)     Hemoglobin 09/22/2024 10.8 (L)     Hematocrit 09/22/2024 32.9 (L)     MCV 09/22/2024 100     MCH 09/22/2024 32.9     MCHC 09/22/2024 32.8     RDW 09/22/2024 14.0     Platelets 09/22/2024 171     POCT Glucose 09/21/2024 127 (H)     POCT Glucose 09/22/2024 139 (H)     POCT Glucose 09/22/2024 227 (H)    Anticoagulation - Warfarin Visit on 09/13/2024   Component Date Value    POC INR 09/13/2024 4.50         Assessment/Plan   Patient Active Problem List   Diagnosis    At risk for bleeding    Adrenal mass (Multi)    Age-related nuclear cataract, bilateral    Osteoporosis    Anemia    CAD (coronary artery disease)    Background diabetic macular edema with mild nonproliferative retinopathy associated with type 2 diabetes mellitus    Cataracts, bilateral    Cerebrovascular disease    Heart failure    CKD (chronic kidney disease)    Diabetic retinopathy (Multi)    HTN (hypertension)    GERD (gastroesophageal reflux disease)    History of ischemic stroke    T2DM (type 2 diabetes mellitus) (Multi)    HLD (hyperlipidemia)    Obesity    Posterior vitreous detachment of both eyes    Primary open angle glaucoma    Glaucoma    Subclinical hypothyroidism    Vitamin D deficiency    Wears glasses    Atrial fibrillation (Multi)    Physical debility      89 year old female patient here today following up on hx of HFpEF and AFIB status post LINQ placement in 08/2015, patient also has hx of CKD Stage III. 2D. Nuclear stress test in 08/2017 showed mild to moderate anterior wall ischemia.  Echo in 2021 showed mild aortic regurgitation and mitral regurgitation with normal ejection fraction.  Patient returns to my office for  follow-up.  Overall has been stable denies having any chest pain or shortness of breath.  Using wheelchair most of the time.  Denies having dizziness lightheadedness palpitations.  Blood pressure and heart rate well-controlled.  Reviewed patient labs creatinine stable 1.7 still on high-dose torsemide.  Cholesterol under control.  At this point patient is doing good from my standpoint we will monitor.  Will follow-up in 6 months.       Ian Potts MD

## 2024-10-28 ENCOUNTER — APPOINTMENT (OUTPATIENT)
Dept: ENDOCRINOLOGY | Facility: CLINIC | Age: 89
End: 2024-10-28
Payer: MEDICARE

## 2024-10-28 VITALS
BODY MASS INDEX: 35.25 KG/M2 | DIASTOLIC BLOOD PRESSURE: 68 MMHG | WEIGHT: 199 LBS | HEART RATE: 53 BPM | SYSTOLIC BLOOD PRESSURE: 108 MMHG

## 2024-10-28 DIAGNOSIS — E11.65 TYPE 2 DIABETES MELLITUS WITH HYPERGLYCEMIA, WITH LONG-TERM CURRENT USE OF INSULIN: Primary | ICD-10-CM

## 2024-10-28 DIAGNOSIS — E78.2 MIXED HYPERLIPIDEMIA: ICD-10-CM

## 2024-10-28 DIAGNOSIS — Z79.4 TYPE 2 DIABETES MELLITUS WITH HYPERGLYCEMIA, WITH LONG-TERM CURRENT USE OF INSULIN: Primary | ICD-10-CM

## 2024-10-28 DIAGNOSIS — I10 ESSENTIAL HYPERTENSION: ICD-10-CM

## 2024-10-28 LAB — POC HEMOGLOBIN A1C: 6.9 % (ref 4.2–6.5)

## 2024-10-28 PROCEDURE — 99214 OFFICE O/P EST MOD 30 MIN: CPT | Performed by: INTERNAL MEDICINE

## 2024-10-28 PROCEDURE — 3074F SYST BP LT 130 MM HG: CPT | Performed by: INTERNAL MEDICINE

## 2024-10-28 PROCEDURE — 1157F ADVNC CARE PLAN IN RCRD: CPT | Performed by: INTERNAL MEDICINE

## 2024-10-28 PROCEDURE — 83036 HEMOGLOBIN GLYCOSYLATED A1C: CPT | Performed by: INTERNAL MEDICINE

## 2024-10-28 PROCEDURE — 3078F DIAST BP <80 MM HG: CPT | Performed by: INTERNAL MEDICINE

## 2024-10-28 PROCEDURE — 1125F AMNT PAIN NOTED PAIN PRSNT: CPT | Performed by: INTERNAL MEDICINE

## 2024-10-28 PROCEDURE — 1036F TOBACCO NON-USER: CPT | Performed by: INTERNAL MEDICINE

## 2024-10-28 ASSESSMENT — ENCOUNTER SYMPTOMS
OCCASIONAL FEELINGS OF UNSTEADINESS: 1
LOSS OF SENSATION IN FEET: 1
DEPRESSION: 0

## 2024-10-28 ASSESSMENT — PAIN SCALES - GENERAL: PAINLEVEL_OUTOF10: 8

## 2024-10-28 ASSESSMENT — PATIENT HEALTH QUESTIONNAIRE - PHQ9
2. FEELING DOWN, DEPRESSED OR HOPELESS: NOT AT ALL
SUM OF ALL RESPONSES TO PHQ9 QUESTIONS 1 & 2: 0
1. LITTLE INTEREST OR PLEASURE IN DOING THINGS: NOT AT ALL

## 2024-11-08 ENCOUNTER — ANTICOAGULATION - WARFARIN VISIT (OUTPATIENT)
Dept: CARDIOLOGY | Facility: CLINIC | Age: 89
End: 2024-11-08
Payer: MEDICARE

## 2024-11-08 DIAGNOSIS — I48.20 CHRONIC ATRIAL FIBRILLATION (MULTI): ICD-10-CM

## 2024-11-08 LAB
POC INR: 1.6
POC PROTHROMBIN TIME: NORMAL

## 2024-11-08 PROCEDURE — 85610 PROTHROMBIN TIME: CPT | Mod: QW

## 2024-11-08 PROCEDURE — 99211 OFF/OP EST MAY X REQ PHY/QHP: CPT | Performed by: INTERNAL MEDICINE

## 2024-11-08 NOTE — PROGRESS NOTES
Patient identification verified with 2 identifiers.    Location: [facility] - [address] [place] [address] [phone]     Referring Physician:  DR TAPIA  Enrollment/ Re-enrollment date: 7/3/2025  INR Goal: 2.0-3.0  INR monitoring is per [facility] protocol.  Anticoagulation Medication: warfarin  Indication: atrial fibrillation    Subjective   Bleeding signs/symptoms: No    Bruising: No   Major bleeding event: No  Thrombosis signs/symptoms: No  Thromboembolic event: No  Missed doses: No  missed 2 doses  Extra doses: No  Medication changes: No  Dietary changes: No  Change in health: No  Change in activity: No  Alcohol: No  Other concerns: No    Upcoming Surgeries:  Does the Patient Have any upcoming surgeries that require interruption in anticoagulation therapy? no  Does the patient require bridging? no      Anticoagulation Summary  As of 11/8/2024      INR goal:  2.0-3.0   TTR:  75.3% (1 y)   INR used for dosing:  --   Weekly warfarin total:  36 mg               Assessment/Plan   Sub Therapeutic     1. New dose: no change  2. Next INR: 1 WEEKs      Education provided to patient during the visit:  Patient instructed to call in interim with questions, concerns and changes.

## 2024-11-13 ENCOUNTER — LAB (OUTPATIENT)
Dept: LAB | Facility: LAB | Age: 89
End: 2024-11-13
Payer: MEDICARE

## 2024-11-13 DIAGNOSIS — Z79.4 TYPE 2 DIABETES MELLITUS WITH OTHER SPECIFIED COMPLICATION, WITH LONG-TERM CURRENT USE OF INSULIN: ICD-10-CM

## 2024-11-13 DIAGNOSIS — Z01.89 ENCOUNTER FOR ROUTINE LABORATORY TESTING: ICD-10-CM

## 2024-11-13 DIAGNOSIS — E11.69 TYPE 2 DIABETES MELLITUS WITH OTHER SPECIFIED COMPLICATION, WITH LONG-TERM CURRENT USE OF INSULIN: ICD-10-CM

## 2024-11-13 DIAGNOSIS — E78.2 MIXED HYPERLIPIDEMIA: ICD-10-CM

## 2024-11-13 DIAGNOSIS — D64.9 ANEMIA, UNSPECIFIED TYPE: ICD-10-CM

## 2024-11-13 DIAGNOSIS — I10 PRIMARY HYPERTENSION: ICD-10-CM

## 2024-11-13 DIAGNOSIS — N25.81 SECONDARY HYPERPARATHYROIDISM OF RENAL ORIGIN (MULTI): ICD-10-CM

## 2024-11-13 DIAGNOSIS — N18.4 CHRONIC KIDNEY DISEASE, STAGE 4 (SEVERE) (MULTI): ICD-10-CM

## 2024-11-13 DIAGNOSIS — E55.9 VITAMIN D DEFICIENCY: ICD-10-CM

## 2024-11-13 DIAGNOSIS — M81.0 AGE-RELATED OSTEOPOROSIS WITHOUT CURRENT PATHOLOGICAL FRACTURE: ICD-10-CM

## 2024-11-13 DIAGNOSIS — N18.4 STAGE 4 CHRONIC KIDNEY DISEASE (MULTI): ICD-10-CM

## 2024-11-13 DIAGNOSIS — E03.8 SUBCLINICAL HYPOTHYROIDISM: ICD-10-CM

## 2024-11-13 LAB
25(OH)D3 SERPL-MCNC: 39 NG/ML (ref 30–100)
ALBUMIN SERPL BCP-MCNC: 3.2 G/DL (ref 3.4–5)
ALP SERPL-CCNC: 78 U/L (ref 33–136)
ALT SERPL W P-5'-P-CCNC: 12 U/L (ref 7–45)
ANION GAP SERPL CALCULATED.3IONS-SCNC: 11 MMOL/L (ref 10–20)
AST SERPL W P-5'-P-CCNC: 12 U/L (ref 9–39)
BASOPHILS # BLD AUTO: 0.06 X10*3/UL (ref 0–0.1)
BASOPHILS NFR BLD AUTO: 0.8 %
BILIRUB DIRECT SERPL-MCNC: 0.1 MG/DL (ref 0–0.3)
BILIRUB SERPL-MCNC: 0.7 MG/DL (ref 0–1.2)
BUN SERPL-MCNC: 19 MG/DL (ref 6–23)
CALCIUM SERPL-MCNC: 9.2 MG/DL (ref 8.6–10.3)
CHLORIDE SERPL-SCNC: 104 MMOL/L (ref 98–107)
CHOLEST SERPL-MCNC: 152 MG/DL (ref 0–199)
CHOLEST/HDLC SERPL: 4.1 {RATIO}
CO2 SERPL-SCNC: 34 MMOL/L (ref 21–32)
CREAT SERPL-MCNC: 1.58 MG/DL (ref 0.5–1.05)
CREAT UR-MCNC: 145.2 MG/DL (ref 20–320)
EGFRCR SERPLBLD CKD-EPI 2021: 31 ML/MIN/1.73M*2
EOSINOPHIL # BLD AUTO: 0.15 X10*3/UL (ref 0–0.4)
EOSINOPHIL NFR BLD AUTO: 2 %
ERYTHROCYTE [DISTWIDTH] IN BLOOD BY AUTOMATED COUNT: 13.5 % (ref 11.5–14.5)
EST. AVERAGE GLUCOSE BLD GHB EST-MCNC: 157 MG/DL
FERRITIN SERPL-MCNC: 206 NG/ML (ref 8–150)
FOLATE SERPL-MCNC: >24 NG/ML
GLUCOSE SERPL-MCNC: 162 MG/DL (ref 74–99)
HBA1C MFR BLD: 7.1 %
HCT VFR BLD AUTO: 37.6 % (ref 36–46)
HDLC SERPL-MCNC: 37.3 MG/DL
HGB BLD-MCNC: 12.1 G/DL (ref 12–16)
IMM GRANULOCYTES # BLD AUTO: 0.02 X10*3/UL (ref 0–0.5)
IMM GRANULOCYTES NFR BLD AUTO: 0.3 % (ref 0–0.9)
IRON SATN MFR SERPL: 32 % (ref 25–45)
IRON SERPL-MCNC: 77 UG/DL (ref 35–150)
LDLC SERPL CALC-MCNC: 92 MG/DL
LYMPHOCYTES # BLD AUTO: 2.3 X10*3/UL (ref 0.8–3)
LYMPHOCYTES NFR BLD AUTO: 31 %
MCH RBC QN AUTO: 33.1 PG (ref 26–34)
MCHC RBC AUTO-ENTMCNC: 32.2 G/DL (ref 32–36)
MCV RBC AUTO: 103 FL (ref 80–100)
MICROALBUMIN UR-MCNC: 209.7 MG/L
MICROALBUMIN/CREAT UR: 144.4 UG/MG CREAT
MONOCYTES # BLD AUTO: 0.69 X10*3/UL (ref 0.05–0.8)
MONOCYTES NFR BLD AUTO: 9.3 %
NEUTROPHILS # BLD AUTO: 4.2 X10*3/UL (ref 1.6–5.5)
NEUTROPHILS NFR BLD AUTO: 56.6 %
NON HDL CHOLESTEROL: 115 MG/DL (ref 0–149)
NRBC BLD-RTO: 0 /100 WBCS (ref 0–0)
PHOSPHATE SERPL-MCNC: 3.6 MG/DL (ref 2.5–4.9)
PLATELET # BLD AUTO: 220 X10*3/UL (ref 150–450)
POTASSIUM SERPL-SCNC: 4.2 MMOL/L (ref 3.5–5.3)
PROT SERPL-MCNC: 6.6 G/DL (ref 6.4–8.2)
PTH-INTACT SERPL-MCNC: 72.5 PG/ML (ref 18.5–88)
RBC # BLD AUTO: 3.66 X10*6/UL (ref 4–5.2)
SODIUM SERPL-SCNC: 145 MMOL/L (ref 136–145)
T4 FREE SERPL-MCNC: 1.26 NG/DL (ref 0.61–1.12)
TIBC SERPL-MCNC: 239 UG/DL (ref 240–445)
TRIGL SERPL-MCNC: 116 MG/DL (ref 0–149)
TSH SERPL-ACNC: 4.18 MIU/L (ref 0.44–3.98)
UIBC SERPL-MCNC: 162 UG/DL (ref 110–370)
VIT B12 SERPL-MCNC: 904 PG/ML (ref 211–911)
VLDL: 23 MG/DL (ref 0–40)
WBC # BLD AUTO: 7.4 X10*3/UL (ref 4.4–11.3)

## 2024-11-13 PROCEDURE — 84443 ASSAY THYROID STIM HORMONE: CPT

## 2024-11-13 PROCEDURE — 83970 ASSAY OF PARATHORMONE: CPT

## 2024-11-13 PROCEDURE — 80061 LIPID PANEL: CPT

## 2024-11-13 PROCEDURE — 83540 ASSAY OF IRON: CPT

## 2024-11-13 PROCEDURE — 84439 ASSAY OF FREE THYROXINE: CPT

## 2024-11-13 PROCEDURE — 82043 UR ALBUMIN QUANTITATIVE: CPT

## 2024-11-13 PROCEDURE — 82746 ASSAY OF FOLIC ACID SERUM: CPT

## 2024-11-13 PROCEDURE — 83550 IRON BINDING TEST: CPT

## 2024-11-13 PROCEDURE — 82570 ASSAY OF URINE CREATININE: CPT

## 2024-11-13 PROCEDURE — 36415 COLL VENOUS BLD VENIPUNCTURE: CPT

## 2024-11-13 PROCEDURE — 82607 VITAMIN B-12: CPT

## 2024-11-13 PROCEDURE — 80053 COMPREHEN METABOLIC PANEL: CPT

## 2024-11-13 PROCEDURE — 85025 COMPLETE CBC W/AUTO DIFF WBC: CPT

## 2024-11-13 PROCEDURE — 84100 ASSAY OF PHOSPHORUS: CPT

## 2024-11-13 PROCEDURE — 82248 BILIRUBIN DIRECT: CPT

## 2024-11-13 PROCEDURE — 82306 VITAMIN D 25 HYDROXY: CPT

## 2024-11-13 PROCEDURE — 82728 ASSAY OF FERRITIN: CPT

## 2024-11-13 PROCEDURE — 83036 HEMOGLOBIN GLYCOSYLATED A1C: CPT

## 2024-11-15 ENCOUNTER — ANTICOAGULATION - WARFARIN VISIT (OUTPATIENT)
Dept: CARDIOLOGY | Facility: CLINIC | Age: 89
End: 2024-11-15
Payer: MEDICARE

## 2024-11-15 DIAGNOSIS — I48.20 CHRONIC ATRIAL FIBRILLATION (MULTI): ICD-10-CM

## 2024-11-15 LAB
POC INR: 2
POC PROTHROMBIN TIME: NORMAL

## 2024-11-15 PROCEDURE — 99211 OFF/OP EST MAY X REQ PHY/QHP: CPT | Performed by: INTERNAL MEDICINE

## 2024-11-15 PROCEDURE — 85610 PROTHROMBIN TIME: CPT | Mod: QW

## 2024-11-19 ENCOUNTER — OFFICE VISIT (OUTPATIENT)
Dept: PRIMARY CARE | Facility: CLINIC | Age: 89
End: 2024-11-19
Payer: MEDICARE

## 2024-11-19 VITALS
SYSTOLIC BLOOD PRESSURE: 132 MMHG | BODY MASS INDEX: 35.26 KG/M2 | WEIGHT: 199 LBS | DIASTOLIC BLOOD PRESSURE: 80 MMHG | HEART RATE: 76 BPM | HEIGHT: 63 IN | OXYGEN SATURATION: 97 %

## 2024-11-19 DIAGNOSIS — E55.9 VITAMIN D DEFICIENCY: ICD-10-CM

## 2024-11-19 DIAGNOSIS — I25.10 CORONARY ARTERY DISEASE INVOLVING NATIVE CORONARY ARTERY OF NATIVE HEART WITHOUT ANGINA PECTORIS: ICD-10-CM

## 2024-11-19 DIAGNOSIS — I50.32 CHRONIC DIASTOLIC HEART FAILURE: ICD-10-CM

## 2024-11-19 DIAGNOSIS — K21.9 GASTROESOPHAGEAL REFLUX DISEASE WITHOUT ESOPHAGITIS: ICD-10-CM

## 2024-11-19 DIAGNOSIS — E11.65 TYPE 2 DIABETES MELLITUS WITH HYPERGLYCEMIA, WITH LONG-TERM CURRENT USE OF INSULIN: ICD-10-CM

## 2024-11-19 DIAGNOSIS — I67.9 CEREBROVASCULAR DISEASE: ICD-10-CM

## 2024-11-19 DIAGNOSIS — M81.0 AGE-RELATED OSTEOPOROSIS WITHOUT CURRENT PATHOLOGICAL FRACTURE: ICD-10-CM

## 2024-11-19 DIAGNOSIS — N18.4 STAGE 4 CHRONIC KIDNEY DISEASE (MULTI): ICD-10-CM

## 2024-11-19 DIAGNOSIS — R53.81 PHYSICAL DEBILITY: ICD-10-CM

## 2024-11-19 DIAGNOSIS — H81.10 BENIGN PAROXYSMAL POSITIONAL VERTIGO, UNSPECIFIED LATERALITY: ICD-10-CM

## 2024-11-19 DIAGNOSIS — E78.2 MIXED HYPERLIPIDEMIA: ICD-10-CM

## 2024-11-19 DIAGNOSIS — Z79.4 TYPE 2 DIABETES MELLITUS WITH HYPERGLYCEMIA, WITH LONG-TERM CURRENT USE OF INSULIN: ICD-10-CM

## 2024-11-19 DIAGNOSIS — Z01.89 ENCOUNTER FOR ROUTINE LABORATORY TESTING: ICD-10-CM

## 2024-11-19 DIAGNOSIS — I10 PRIMARY HYPERTENSION: ICD-10-CM

## 2024-11-19 DIAGNOSIS — E27.8 ADRENAL MASS (MULTI): ICD-10-CM

## 2024-11-19 DIAGNOSIS — E66.01 CLASS 2 SEVERE OBESITY WITH SERIOUS COMORBIDITY AND BODY MASS INDEX (BMI) OF 35.0 TO 35.9 IN ADULT, UNSPECIFIED OBESITY TYPE: Primary | ICD-10-CM

## 2024-11-19 DIAGNOSIS — Z00.00 ANNUAL PHYSICAL EXAM: ICD-10-CM

## 2024-11-19 DIAGNOSIS — E66.812 CLASS 2 SEVERE OBESITY WITH SERIOUS COMORBIDITY AND BODY MASS INDEX (BMI) OF 35.0 TO 35.9 IN ADULT, UNSPECIFIED OBESITY TYPE: Primary | ICD-10-CM

## 2024-11-19 DIAGNOSIS — I48.20 CHRONIC ATRIAL FIBRILLATION (MULTI): ICD-10-CM

## 2024-11-19 DIAGNOSIS — E03.8 SUBCLINICAL HYPOTHYROIDISM: ICD-10-CM

## 2024-11-19 DIAGNOSIS — D64.9 ANEMIA, UNSPECIFIED TYPE: ICD-10-CM

## 2024-11-19 PROCEDURE — 99215 OFFICE O/P EST HI 40 MIN: CPT | Performed by: STUDENT IN AN ORGANIZED HEALTH CARE EDUCATION/TRAINING PROGRAM

## 2024-11-19 PROCEDURE — 1036F TOBACCO NON-USER: CPT | Performed by: STUDENT IN AN ORGANIZED HEALTH CARE EDUCATION/TRAINING PROGRAM

## 2024-11-19 PROCEDURE — 1160F RVW MEDS BY RX/DR IN RCRD: CPT | Performed by: STUDENT IN AN ORGANIZED HEALTH CARE EDUCATION/TRAINING PROGRAM

## 2024-11-19 PROCEDURE — 99417 PROLNG OP E/M EACH 15 MIN: CPT | Performed by: STUDENT IN AN ORGANIZED HEALTH CARE EDUCATION/TRAINING PROGRAM

## 2024-11-19 PROCEDURE — 1126F AMNT PAIN NOTED NONE PRSNT: CPT | Performed by: STUDENT IN AN ORGANIZED HEALTH CARE EDUCATION/TRAINING PROGRAM

## 2024-11-19 PROCEDURE — 3075F SYST BP GE 130 - 139MM HG: CPT | Performed by: STUDENT IN AN ORGANIZED HEALTH CARE EDUCATION/TRAINING PROGRAM

## 2024-11-19 PROCEDURE — 1157F ADVNC CARE PLAN IN RCRD: CPT | Performed by: STUDENT IN AN ORGANIZED HEALTH CARE EDUCATION/TRAINING PROGRAM

## 2024-11-19 PROCEDURE — 1159F MED LIST DOCD IN RCRD: CPT | Performed by: STUDENT IN AN ORGANIZED HEALTH CARE EDUCATION/TRAINING PROGRAM

## 2024-11-19 PROCEDURE — 3079F DIAST BP 80-89 MM HG: CPT | Performed by: STUDENT IN AN ORGANIZED HEALTH CARE EDUCATION/TRAINING PROGRAM

## 2024-11-19 ASSESSMENT — ENCOUNTER SYMPTOMS
CARDIOVASCULAR NEGATIVE: 1
RESPIRATORY NEGATIVE: 1
GASTROINTESTINAL NEGATIVE: 1
CONSTITUTIONAL NEGATIVE: 1

## 2024-11-19 ASSESSMENT — PAIN SCALES - GENERAL: PAINLEVEL_OUTOF10: 0-NO PAIN

## 2024-11-19 NOTE — PROGRESS NOTES
[facility]: [facility]  PROGRESS NOTE      Shoshana Thomas is a 89 y.o. female that is presenting today for Follow-up.    Assessment/Plan   - Patient noting some intermittent room-spinning sensations. Suspect vertigo. Refer to vestibular therapy.  - Otherwise, the patient feels well and denies any acute symptoms / concerns at this time.  - Blood pressure at goal today.  - Encouraged continued dietary, exercise, and lifestyle modification.  - Significant medication and problem list reconciliation done today.   - Patient had labwork done for this appointment. Discussed today.  - Mild thyroid lab abnormalities; however, the patient feels well. Will not make changes at this time.  - Remainder of labwork looked great.  - Will order labwork for the patient's next appointment. Encouraged the patient to get this labwork done one week prior to the next appointment.    Diagnoses and all orders for this visit:  Class 2 severe obesity with serious comorbidity and body mass index (BMI) of 35.0 to 35.9 in adult, unspecified obesity type  -     Follow Up In Primary Care  Coronary artery disease involving native coronary artery of native heart without angina pectoris  -     Follow Up In Primary Care  Type 2 diabetes mellitus with hyperglycemia, with long-term current use of insulin  -     Follow Up In Primary Care  -     Hemoglobin A1C; Future  Age-related osteoporosis without current pathological fracture  -     Follow Up In Primary Care  Benign paroxysmal positional vertigo, unspecified laterality  -     Referral to Physical Therapy; Future  Gastroesophageal reflux disease without esophagitis  -     Follow Up In Primary Care  Stage 4 chronic kidney disease (Multi)  -     Follow Up In Primary Care  -     Comprehensive Metabolic Panel; Future  Chronic atrial fibrillation (Multi)  -     Follow Up In Primary Care  Chronic diastolic heart failure  -     Follow Up In Primary Care  Subclinical hypothyroidism  -  "    Follow Up In Primary Care  Anemia, unspecified type  -     Follow Up In Primary Care  -     CBC and Auto Differential; Future  Cerebrovascular disease  -     Follow Up In Primary Care  Mixed hyperlipidemia  -     Follow Up In Primary Care  Primary hypertension  -     Follow Up In Primary Care  -     Comprehensive Metabolic Panel; Future  Adrenal mass (Multi)  -     Follow Up In Primary Care  Vitamin D deficiency  -     Follow Up In Primary Care  Physical debility  -     Follow Up In Primary Care  Encounter for routine laboratory testing  Annual physical exam  -     Follow Up In Primary Care; Future    Current Outpatient Medications   Medication Instructions    acetaminophen (Tylenol) 325 mg tablet 1 tablet, 3 times daily PRN    atorvastatin (LIPITOR) 20 mg, oral, Daily    Basaglar KwikPen U-100 Insulin 12 Units, subcutaneous, Every morning, As directed    blood sugar diagnostic (Accu-Chek SmartView Test Strip) strip USE 1 STRIP INTO VITRO, 3 TIMES A DAY-- E11.65    calcitriol (ROCALTROL) 0.25 mcg, Daily    calcium carbonate-vitamin D3 500 mg-5 mcg (200 unit) tablet Os-Joselo 500 + D 500-200 MG-UNIT TABS Refills: 0   Active    carvedilol (COREG) 3.125 mg, oral, 2 times daily    ferrous gluconate (FERATE) 27 mg, oral, Every other day    hydrALAZINE (APRESOLINE) 50 mg, oral, 2 times daily    insulin lispro (HumaLOG KwikPen Insulin) 100 unit/mL injection Up to 30 units daily As directed    isosorbide mononitrate ER (IMDUR) 60 mg, oral, Daily    latanoprost (Xalatan) 0.005 % ophthalmic solution 1 drop, Nightly    lubricating eye drops (polyvinyl alcohol-povidon,PF,) ophthalmic solution 1 drop, As needed    omeprazole (PRILOSEC) 20 mg, Daily before breakfast    pen needle, diabetic 32 gauge x 5/32\" needle 1 Needle, miscellaneous, 4 times daily    potassium chloride CR 10 mEq ER tablet TAKE 1 TABLET BY MOUTH TWICE A DAY WITH FOOD FOR 90 DAYS    timolol (Timoptic) 0.5 % ophthalmic solution 1 drop, Every morning    " torsemide (DEMADEX) 50 mg, oral, 2 times daily    Trulicity 1.5 mg, subcutaneous, Once Weekly, As directed    VITAMIN B COMPLEX ORAL 1 tablet, Daily    warfarin (Coumadin) 6 mg tablet TAKE 1 TABLET (6 MG) BY MOUTH SEE ADMINISTRATION INSTRUCTIONS. 1 1/2 FRIDAY 6 MG REST OF WK     Subjective   - The patient otherwise feels well and denies any acute symptoms or concerns at this time.  - The patient denies any changes or progression of their chronic medical problems.  - The patient denies any problems or concerns with their medications.      Review of Systems   Constitutional: Negative.    Respiratory: Negative.     Cardiovascular: Negative.    Gastrointestinal: Negative.    All other systems reviewed and are negative.     Objective   Vitals:    11/19/24 1527   BP: 132/80   Pulse: 76   SpO2: 97%      Body mass index is 35.26 kg/m².  Physical Exam  Vitals and nursing note reviewed.   Constitutional:       General: She is not in acute distress.  Neck:      Vascular: No carotid bruit.   Cardiovascular:      Rate and Rhythm: Normal rate and regular rhythm.      Heart sounds: Normal heart sounds.   Pulmonary:      Effort: Pulmonary effort is normal.      Breath sounds: Normal breath sounds.   Musculoskeletal:         General: No swelling.   Neurological:      Mental Status: She is alert. Mental status is at baseline.   Psychiatric:         Mood and Affect: Mood normal.       Diagnostic Results   Lab Results   Component Value Date    GLUCOSE 162 (H) 11/13/2024    CALCIUM 9.2 11/13/2024     11/13/2024    K 4.2 11/13/2024    CO2 34 (H) 11/13/2024     11/13/2024    BUN 19 11/13/2024    CREATININE 1.58 (H) 11/13/2024     Lab Results   Component Value Date    ALT 12 11/13/2024    AST 12 11/13/2024    ALKPHOS 78 11/13/2024    BILITOT 0.7 11/13/2024     Lab Results   Component Value Date    WBC 7.4 11/13/2024    HGB 12.1 11/13/2024    HCT 37.6 11/13/2024     (H) 11/13/2024     11/13/2024     Lab Results  "  Component Value Date    CHOL 152 11/13/2024    CHOL 133 11/04/2022    CHOL 133 11/04/2022     Lab Results   Component Value Date    HDL 37.3 11/13/2024    HDL 42 (L) 11/04/2022    HDL 42 (L) 11/04/2022     Lab Results   Component Value Date    LDLCALC 92 11/13/2024    LDLCALC 75 11/04/2022    LDLCALC 75 11/04/2022     Lab Results   Component Value Date    TRIG 116 11/13/2024    TRIG 82 11/04/2022    TRIG 82 11/04/2022     No components found for: \"CHOLHDL\"  Lab Results   Component Value Date    HGBA1C 7.1 (H) 11/13/2024     Other labs not included in the list above were reviewed either before or during this encounter.    History    Past Medical History:   Diagnosis Date    Adrenal mass (Multi)     At risk for bleeding 09/07/2023    Atherosclerotic heart disease of native coronary artery without angina pectoris 09/07/2023    Cataract     Chronic atrial fibrillation (Multi) 09/07/2023    Chronic diastolic heart failure     Glaucoma     History of ischemic stroke 09/07/2023    Hyperlipidemia     Hypertension     Iron deficiency anemia     Mixed hyperlipidemia 09/07/2023    Syncope and collapse 09/07/2023    Type 2 diabetes mellitus     Vitamin B12 deficiency      Past Surgical History:   Procedure Laterality Date    MR HEAD ANGIO WO IV CONTRAST  06/14/2016    MR HEAD ANGIO WO IV CONTRAST [facility]     Family History   Problem Relation Name Age of Onset    Other (VAD) Mother      Diabetes Father      Other (Sepsis) Father      Other (DJD) Sister      Diabetes Sister      Factor V Leiden deficiency Sister      Other (CKD) Brother      Heart disease Other      Hypertension Other      Cancer Other      Stroke Other       Social History     Socioeconomic History    Marital status:      Spouse name: Not on file    Number of children: Not on file    Years of education: Not on file    Highest education level: Not on file   Occupational History    Not on file   Tobacco Use    Smoking status: Never     " Passive exposure: Never    Smokeless tobacco: Never   Vaping Use    Vaping status: Never Used   Substance and Sexual Activity    Alcohol use: Yes     Comment: very rarley\ Occasional    Drug use: Never    Sexual activity: Defer   Other Topics Concern    Not on file   Social History Narrative    Not on file     Social Drivers of Health     Financial Resource Strain: Low Risk  (9/19/2024)    Overall Financial Resource Strain (CARDIA)     Difficulty of Paying Living Expenses: Not hard at all   Food Insecurity: No Food Insecurity (9/19/2024)    Hunger Vital Sign     Worried About Running Out of Food in the Last Year: Never true     Ran Out of Food in the Last Year: Never true   Transportation Needs: No Transportation Needs (10/22/2024)    OASIS : Transportation     Lack of Transportation (Medical): No     Lack of Transportation (Non-Medical): No     Patient Unable or Declines to Respond: No   Physical Activity: Not on file   Stress: Not on file   Social Connections: Feeling Socially Integrated (10/22/2024)    OASIS : Social Isolation     Frequency of experiencing loneliness or isolation: Never   Intimate Partner Violence: Not At Risk (9/19/2024)    Humiliation, Afraid, Rape, and Kick questionnaire     Fear of Current or Ex-Partner: No     Emotionally Abused: No     Physically Abused: No     Sexually Abused: No   Housing Stability: Low Risk  (9/19/2024)    Housing Stability Vital Sign     Unable to Pay for Housing in the Last Year: No     Number of Times Moved in the Last Year: 0     Homeless in the Last Year: No     No Known Allergies  Current Outpatient Medications on File Prior to Visit   Medication Sig Dispense Refill    acetaminophen (Tylenol) 325 mg tablet Take 1 tablet (325 mg) by mouth 3 times a day as needed for mild pain (1 - 3).      atorvastatin (Lipitor) 20 mg tablet TAKE 1 TABLET BY MOUTH EVERY DAY 90 tablet 3    blood sugar diagnostic (Accu-Chek SmartView Test Strip) strip USE 1 STRIP INTO VITRO,  "3 TIMES A DAY-- E11.65 300 strip 1    calcitriol (Rocaltrol) 0.25 mcg capsule Take 1 capsule (0.25 mcg) by mouth once daily.      calcium carbonate-vitamin D3 500 mg-5 mcg (200 unit) tablet Os-Joselo 500 + D 500-200 MG-UNIT TABS Refills: 0   Active      carvedilol (Coreg) 3.125 mg tablet Take 1 tablet (3.125 mg) by mouth 2 times a day. 180 tablet 3    dulaglutide (Trulicity) 1.5 mg/0.5 mL pen injector injection Inject 1.5 mg under the skin 1 (one) time per week. As directed 2 mL 1    ferrous gluconate (Ferate) 240 (27 Fe) MG tablet Take 1 tablet (27 mg) by mouth every other day. 45 tablet 1    hydrALAZINE (Apresoline) 50 mg tablet Take 1 tablet (50 mg) by mouth 2 times a day. 180 tablet 3    insulin glargine (Basaglar KwikPen U-100 Insulin) 100 unit/mL (3 mL) pen Inject 12 Units under the skin once daily in the morning. As directed      insulin lispro (HumaLOG KwikPen Insulin) 100 unit/mL injection Up to 30 units daily As directed 45 mL 3    isosorbide mononitrate ER (Imdur) 60 mg 24 hr tablet Take 1 tablet (60 mg) by mouth once daily. 90 tablet 3    latanoprost (Xalatan) 0.005 % ophthalmic solution Administer 1 drop into affected eye(s) once daily at bedtime. Both eyes      lubricating eye drops (polyvinyl alcohol-povidon,PF,) ophthalmic solution Administer 1 drop into both eyes if needed for dry eyes.      omeprazole (PriLOSEC) 20 mg DR capsule Take 1 capsule (20 mg) by mouth once daily in the morning. Take before meals. 30 minutes before      pen needle, diabetic 32 gauge x 5/32\" needle 1 Needle 4 times a day. 400 each 1    potassium chloride CR 10 mEq ER tablet TAKE 1 TABLET BY MOUTH TWICE A DAY WITH FOOD FOR 90 DAYS 180 tablet 3    timolol (Timoptic) 0.5 % ophthalmic solution Administer 1 drop into both eyes once daily in the morning. *NEED APPT FOR FUTURE FILLS*      torsemide (Demadex) 100 mg tablet Take 0.5 tablets (50 mg) by mouth 2 times a day. 90 tablet 3    VITAMIN B COMPLEX ORAL Take 1 tablet by mouth once " daily. As directed      warfarin (Coumadin) 6 mg tablet TAKE 1 TABLET (6 MG) BY MOUTH SEE ADMINISTRATION INSTRUCTIONS. 1 1/2 FRIDAY 6 MG REST OF WK 96 tablet 1     No current facility-administered medications on file prior to visit.     Immunization History   Administered Date(s) Administered    Flu vaccine, quadrivalent, high-dose, preservative free, age 65y+ (FLUZONE) 11/08/2021, 11/11/2022, 10/20/2023    Flu vaccine, trivalent, preservative free, HIGH-DOSE, age 65y+ (Fluzone) 09/12/2013, 09/24/2014, 10/08/2015, 10/07/2016, 10/13/2017, 11/09/2018, 10/07/2019, 09/21/2020, 11/08/2021, 11/11/2022, 10/20/2023, 10/10/2024    Influenza Whole 11/07/2007    Influenza, seasonal, injectable 10/04/2010, 10/26/2011, 10/01/2012    Moderna SARS-CoV-2 Vaccination 02/05/2021, 03/06/2021, 01/13/2022    Novel influenza-H1N1-09, preservative-free 01/12/2010    Pneumococcal conjugate vaccine, 13-valent (PREVNAR 13) 11/20/2015    Pneumococcal polysaccharide vaccine, 23-valent, age 2 years and older (PNEUMOVAX 23) 11/29/2005, 12/23/2019     Patient's medical history was reviewed and updated either before or during this encounter.       Gene Fraser MD

## 2024-11-19 NOTE — PATIENT INSTRUCTIONS
- Patient noting some intermittent room-spinning sensations. Suspect vertigo. Refer to vestibular therapy.  - Otherwise, the patient feels well and denies any acute symptoms / concerns at this time.  - Blood pressure at goal today.  - Encouraged continued dietary, exercise, and lifestyle modification.  - Significant medication and problem list reconciliation done today.   - Patient had labwork done for this appointment. Discussed today.  - Mild thyroid lab abnormalities; however, the patient feels well. Will not make changes at this time.  - Remainder of labwork looked great.  - Will order labwork for the patient's next appointment. Encouraged the patient to get this labwork done one week prior to the next appointment.

## 2024-12-03 ENCOUNTER — TELEPHONE (OUTPATIENT)
Dept: CARDIOLOGY | Facility: CLINIC | Age: 89
End: 2024-12-03

## 2024-12-06 ENCOUNTER — APPOINTMENT (OUTPATIENT)
Dept: CARDIOLOGY | Facility: CLINIC | Age: 89
End: 2024-12-06
Payer: MEDICARE

## 2024-12-13 ENCOUNTER — PATIENT OUTREACH (OUTPATIENT)
Dept: PRIMARY CARE | Facility: CLINIC | Age: 89
End: 2024-12-13
Payer: MEDICARE

## 2024-12-20 ENCOUNTER — ANTICOAGULATION - WARFARIN VISIT (OUTPATIENT)
Dept: CARDIOLOGY | Facility: CLINIC | Age: 89
End: 2024-12-20
Payer: MEDICARE

## 2024-12-20 DIAGNOSIS — I48.20 CHRONIC ATRIAL FIBRILLATION (MULTI): ICD-10-CM

## 2024-12-20 LAB
POC INR: 1.8
POC PROTHROMBIN TIME: NORMAL

## 2024-12-20 PROCEDURE — 99211 OFF/OP EST MAY X REQ PHY/QHP: CPT | Performed by: INTERNAL MEDICINE

## 2024-12-20 PROCEDURE — 85610 PROTHROMBIN TIME: CPT | Mod: QW

## 2024-12-20 NOTE — PROGRESS NOTES
Patient identification verified with 2 identifiers.    Location: [facility] - [address] [place] [address] [phone]     Referring Physician:  DR TAPIA  Enrollment/ Re-enrollment date: 7/3/2025  INR Goal: 2.0-3.0  INR monitoring is per [facility] protocol.  Anticoagulation Medication: warfarin  Indication: atrial fibrillation    Subjective   Bleeding signs/symptoms: No    Bruising: No   Major bleeding event: No  Thrombosis signs/symptoms: No  Thromboembolic event: No  Missed doses: No  Extra doses: No  Medication changes: No  Dietary changes: No  Change in health: No  Change in activity: No  Alcohol: No  Other concerns: No    Upcoming Surgeries:  Does the Patient Have any upcoming surgeries that require interruption in anticoagulation therapy? no  Does the patient require bridging? no      Anticoagulation Summary  As of 2024      INR goal:  2.0-3.0   TTR:  65.4% (1.2 y)   INR used for dosin.80 (2024)   Weekly warfarin total:  36 mg               Assessment/Plan   Sub Therapeutic     1. New dose: no change will give one time extra dose  2. Next INR: 1 wEEKs, will be 2 due to holiday schedule.      Education provided to patient during the visit:  Patient instructed to call in interim with questions, concerns and changes.

## 2025-01-10 ENCOUNTER — ANTICOAGULATION - WARFARIN VISIT (OUTPATIENT)
Dept: CARDIOLOGY | Facility: CLINIC | Age: OVER 89
End: 2025-01-10
Payer: MEDICARE

## 2025-01-10 DIAGNOSIS — I48.20 CHRONIC ATRIAL FIBRILLATION (MULTI): ICD-10-CM

## 2025-01-10 LAB
POC INR: 2.5
POC PROTHROMBIN TIME: NORMAL

## 2025-01-10 PROCEDURE — 85610 PROTHROMBIN TIME: CPT | Mod: QW

## 2025-01-10 NOTE — PROGRESS NOTES
Patient identification verified with 2 identifiers.    Location: [facility] - [address] [place] [address] [phone]     Referring Physician: Dr Potts  Enrollment/ Re-enrollment date:    INR Goal: 2.0-3.0  INR monitoring is per [facility] protocol.  Anticoagulation Medication: warfarin  Indication: Atrial Fibrillation/Atrial Flutter    Subjective   Bleeding signs/symptoms: No    Bruising: No   Major bleeding event: No  Thrombosis signs/symptoms: No  Thromboembolic event: No  Missed doses: No  Extra doses: No  Medication changes: No  Dietary changes: No  Change in health: No  Change in activity: No  Alcohol: No  Other concerns: No    Upcoming Procedures:  Does the Patient Have any upcoming procedures that require interruption in anticoagulation therapy? no  Does the patient require bridging? no      Anticoagulation Summary  As of 1/10/2025      INR goal:  2.0-3.0   TTR:  65.7% (1.3 y)   INR used for dosin.50 (1/10/2025)   Weekly warfarin total:  36 mg               Assessment/Plan   Therapeutic     1. New dose: no change    2. Next INR: 1 month      Education provided to patient during the visit:  Patient instructed to call in interim with questions, concerns and changes.

## 2025-02-07 ENCOUNTER — ANTICOAGULATION - WARFARIN VISIT (OUTPATIENT)
Dept: CARDIOLOGY | Facility: CLINIC | Age: OVER 89
End: 2025-02-07
Payer: MEDICARE

## 2025-02-07 DIAGNOSIS — I48.20 CHRONIC ATRIAL FIBRILLATION (MULTI): ICD-10-CM

## 2025-02-07 LAB
POC INR: 2
POC PROTHROMBIN TIME: NORMAL

## 2025-02-07 PROCEDURE — 85610 PROTHROMBIN TIME: CPT | Mod: QW

## 2025-02-07 PROCEDURE — 99211 OFF/OP EST MAY X REQ PHY/QHP: CPT | Performed by: INTERNAL MEDICINE

## 2025-02-07 NOTE — PROGRESS NOTES
Patient identification verified with 2 identifiers.    Location: [facility] - [address] [place] [address] [phone]     Referring Physician: Dr Potts  Enrollment/ Re-enrollment date:    INR Goal: 2.0-3.0  INR monitoring is per [facility] protocol.  Anticoagulation Medication: warfarin  Indication: Atrial Fibrillation/Atrial Flutter    Subjective   Bleeding signs/symptoms: No    Bruising: No   Major bleeding event: No  Thrombosis signs/symptoms: No  Thromboembolic event: No  Missed doses: No  Extra doses: No  Medication changes: No  Dietary changes: No  Change in health: No  Change in activity: No  Alcohol: No  Other concerns: No    Upcoming Procedures:  Does the Patient Have any upcoming procedures that require interruption in anticoagulation therapy? no  Does the patient require bridging? no      Anticoagulation Summary  As of 2025      INR goal:  2.0-3.0   TTR:  67.7% (1.3 y)   INR used for dosin.00 (2025)   Weekly warfarin total:  36 mg               Assessment/Plan   Therapeutic     1. New dose: no change    2. Next INR: 1 month      Education provided to patient during the visit:  Patient instructed to call in interim with questions, concerns and changes.      Dictation #1  MRN:[id number]  CSN:[id number]

## 2025-02-08 DIAGNOSIS — I10 PRIMARY HYPERTENSION: ICD-10-CM

## 2025-02-10 DIAGNOSIS — I10 PRIMARY HYPERTENSION: ICD-10-CM

## 2025-02-10 RX ORDER — CARVEDILOL 3.12 MG/1
3.12 TABLET ORAL 2 TIMES DAILY
Qty: 180 TABLET | Refills: 3 | Status: SHIPPED | OUTPATIENT
Start: 2025-02-10 | End: 2025-02-11 | Stop reason: SDUPTHER

## 2025-02-10 NOTE — TELEPHONE ENCOUNTER
carvedilol (Coreg) 3.125 mg tablet  3.125 mg, 2 times daily   90 day supply   [facility]/pharmacy #[id number] - [facility] [address] [address] [place] AT [facility]

## 2025-02-12 RX ORDER — CARVEDILOL 3.12 MG/1
3.12 TABLET ORAL 2 TIMES DAILY
Qty: 180 TABLET | Refills: 3 | Status: SHIPPED | OUTPATIENT
Start: 2025-02-12 | End: 2026-02-07

## 2025-03-11 ENCOUNTER — ANTICOAGULATION - WARFARIN VISIT (OUTPATIENT)
Dept: CARDIOLOGY | Facility: CLINIC | Age: OVER 89
End: 2025-03-11
Payer: MEDICARE

## 2025-03-11 DIAGNOSIS — I48.20 CHRONIC ATRIAL FIBRILLATION (MULTI): ICD-10-CM

## 2025-03-14 ENCOUNTER — ANTICOAGULATION - WARFARIN VISIT (OUTPATIENT)
Dept: CARDIOLOGY | Facility: CLINIC | Age: OVER 89
End: 2025-03-14
Payer: MEDICARE

## 2025-03-14 DIAGNOSIS — I48.20 CHRONIC ATRIAL FIBRILLATION (MULTI): ICD-10-CM

## 2025-03-14 LAB
POC INR: 2.4
POC PROTHROMBIN TIME: NORMAL

## 2025-03-14 PROCEDURE — 99211 OFF/OP EST MAY X REQ PHY/QHP: CPT | Performed by: INTERNAL MEDICINE

## 2025-03-14 PROCEDURE — 85610 PROTHROMBIN TIME: CPT | Mod: QW

## 2025-03-14 NOTE — PROGRESS NOTES
Patient identification verified with 2 identifiers.    Location: [facility] - [address] [place] [address] [phone]     Referring Physician: Dr Potts  Enrollment/ Re-enrollment date:    INR Goal: 2.0-3.0  INR monitoring is per [facility] protocol.  Anticoagulation Medication: warfarin  Indication: Atrial Fibrillation/Atrial Flutter    Subjective   Bleeding signs/symptoms: No    Bruising: No   Major bleeding event: No  Thrombosis signs/symptoms: No  Thromboembolic event: No  Missed doses: No  Extra doses: No  Medication changes: No  Dietary changes: No  Change in health: No  Change in activity: No  Alcohol: No  Other concerns: No    Upcoming Procedures:  Does the Patient Have any upcoming procedures that require interruption in anticoagulation therapy? no  Does the patient require bridging? no      Anticoagulation Summary  As of 3/14/2025      INR goal:  2.0-3.0   TTR:  69.8% (1.4 y)   INR used for dosin.40 (3/14/2025)   Weekly warfarin total:  36 mg               Assessment/Plan   Therapeutic     1. New dose: no change    2. Next INR: 1 month      Education provided to patient during the visit:  Patient instructed to call in interim with questions, concerns and changes.      Dictation #1  MRN:[id number]  CSN:[id number]

## 2025-03-18 ENCOUNTER — TELEPHONE (OUTPATIENT)
Dept: CARDIOLOGY | Facility: CLINIC | Age: OVER 89
End: 2025-03-18
Payer: MEDICARE

## 2025-03-18 DIAGNOSIS — I10 ESSENTIAL HYPERTENSION: ICD-10-CM

## 2025-03-18 RX ORDER — HYDRALAZINE HYDROCHLORIDE 50 MG/1
50 TABLET, FILM COATED ORAL 2 TIMES DAILY
Qty: 180 TABLET | Refills: 3 | Status: SHIPPED | OUTPATIENT
Start: 2025-03-18 | End: 2026-03-13

## 2025-03-18 NOTE — TELEPHONE ENCOUNTER
hydrALAZINE (Apresoline) 50 mg tablet 1 tab twice daily   90 day supply   [facility]/pharmacy #[id number] [facility] [address] AT [facility]

## 2025-03-18 NOTE — TELEPHONE ENCOUNTER
Please send the attached prescription to the patient's pharmacy as soon as possible. Thank you!    Requested Prescriptions     Pending Prescriptions Disp Refills    hydrALAZINE (Apresoline) 50 mg tablet 180 tablet 3     Sig: Take 1 tablet (50 mg) by mouth 2 times a day.

## 2025-04-11 ENCOUNTER — ANTICOAGULATION - WARFARIN VISIT (OUTPATIENT)
Dept: CARDIOLOGY | Facility: CLINIC | Age: OVER 89
End: 2025-04-11
Payer: MEDICARE

## 2025-04-11 DIAGNOSIS — I48.20 CHRONIC ATRIAL FIBRILLATION (MULTI): ICD-10-CM

## 2025-04-11 LAB
POC INR: 2
POC PROTHROMBIN TIME: NORMAL

## 2025-04-11 PROCEDURE — 85610 PROTHROMBIN TIME: CPT | Mod: QW

## 2025-04-11 NOTE — PROGRESS NOTES
Patient identification verified with 2 identifiers.    Location: [facility] - [address] [place] [address] [phone]     Referring Physician: Dr Potts  Enrollment/ Re-enrollment date:    INR Goal: 2.0-3.0  INR monitoring is per [facility] protocol.  Anticoagulation Medication: warfarin  Indication: Atrial Fibrillation/Atrial Flutter    Subjective   Bleeding signs/symptoms: No    Bruising: No   Major bleeding event: No  Thrombosis signs/symptoms: No  Thromboembolic event: No  Missed doses: No  Extra doses: No  Medication changes: No  Dietary changes: No  Change in health: No  Change in activity: No  Alcohol: No  Other concerns: No    Upcoming Procedures:  Does the Patient Have any upcoming procedures that require interruption in anticoagulation therapy? no  Does the patient require bridging? no      Anticoagulation Summary  As of 2025      INR goal:  2.0-3.0   TTR:  71.4% (1.5 y)   INR used for dosin.00 (2025)   Weekly warfarin total:  36 mg               Assessment/Plan   Therapeutic     1. New dose: no change    2. Next INR: 1 month      Education provided to patient during the visit:  Patient instructed to call in interim with questions, concerns and changes.      Dictation #1  MRN:[id number]  CSN:[id number] xx

## 2025-04-24 ENCOUNTER — PHARMACY VISIT (OUTPATIENT)
Dept: PHARMACY | Facility: CLINIC | Age: OVER 89
End: 2025-04-24
Payer: COMMERCIAL

## 2025-04-24 ENCOUNTER — APPOINTMENT (OUTPATIENT)
Dept: CARDIOLOGY | Facility: CLINIC | Age: OVER 89
End: 2025-04-24
Payer: MEDICARE

## 2025-04-24 DIAGNOSIS — I25.10 CORONARY ARTERY DISEASE INVOLVING NATIVE CORONARY ARTERY OF NATIVE HEART WITHOUT ANGINA PECTORIS: ICD-10-CM

## 2025-04-24 DIAGNOSIS — E78.2 MIXED HYPERLIPIDEMIA: ICD-10-CM

## 2025-04-24 DIAGNOSIS — I35.0 NONRHEUMATIC AORTIC VALVE STENOSIS: Primary | ICD-10-CM

## 2025-04-24 DIAGNOSIS — I10 PRIMARY HYPERTENSION: ICD-10-CM

## 2025-04-24 DIAGNOSIS — I48.0 PAROXYSMAL ATRIAL FIBRILLATION (MULTI): ICD-10-CM

## 2025-04-24 DIAGNOSIS — I50.30 DIASTOLIC HEART FAILURE, UNSPECIFIED HF CHRONICITY: ICD-10-CM

## 2025-04-24 PROCEDURE — 1157F ADVNC CARE PLAN IN RCRD: CPT | Performed by: INTERNAL MEDICINE

## 2025-04-24 PROCEDURE — 1160F RVW MEDS BY RX/DR IN RCRD: CPT | Performed by: INTERNAL MEDICINE

## 2025-04-24 PROCEDURE — 3074F SYST BP LT 130 MM HG: CPT | Performed by: INTERNAL MEDICINE

## 2025-04-24 PROCEDURE — 1126F AMNT PAIN NOTED NONE PRSNT: CPT | Performed by: INTERNAL MEDICINE

## 2025-04-24 PROCEDURE — 3078F DIAST BP <80 MM HG: CPT | Performed by: INTERNAL MEDICINE

## 2025-04-24 PROCEDURE — 99214 OFFICE O/P EST MOD 30 MIN: CPT | Performed by: INTERNAL MEDICINE

## 2025-04-24 PROCEDURE — 1159F MED LIST DOCD IN RCRD: CPT | Performed by: INTERNAL MEDICINE

## 2025-04-24 PROCEDURE — RXMED WILLOW AMBULATORY MEDICATION CHARGE

## 2025-04-24 ASSESSMENT — PAIN SCALES - GENERAL: PAINLEVEL_OUTOF10: 0-NO PAIN

## 2025-04-24 ASSESSMENT — ENCOUNTER SYMPTOMS
OCCASIONAL FEELINGS OF UNSTEADINESS: 1
DEPRESSION: 0
LOSS OF SENSATION IN FEET: 0

## 2025-04-24 ASSESSMENT — LIFESTYLE VARIABLES
HAS A RELATIVE, FRIEND, DOCTOR, OR ANOTHER HEALTH PROFESSIONAL EXPRESSED CONCERN ABOUT YOUR DRINKING OR SUGGESTED YOU CUT DOWN: NO
AUDIT TOTAL SCORE: 1
SKIP TO QUESTIONS 9-10: 1
HOW OFTEN DO YOU HAVE SIX OR MORE DRINKS ON ONE OCCASION: NEVER
HAVE YOU OR SOMEONE ELSE BEEN INJURED AS A RESULT OF YOUR DRINKING: NO
HOW MANY STANDARD DRINKS CONTAINING ALCOHOL DO YOU HAVE ON A TYPICAL DAY: 1 OR 2
HOW OFTEN DO YOU HAVE A DRINK CONTAINING ALCOHOL: MONTHLY OR LESS
AUDIT-C TOTAL SCORE: 1

## 2025-04-24 ASSESSMENT — PATIENT HEALTH QUESTIONNAIRE - PHQ9
2. FEELING DOWN, DEPRESSED OR HOPELESS: NOT AT ALL
SUM OF ALL RESPONSES TO PHQ9 QUESTIONS 1 AND 2: 0
1. LITTLE INTEREST OR PLEASURE IN DOING THINGS: NOT AT ALL

## 2025-04-24 NOTE — PROGRESS NOTES
History of present illness:  89 year old female patient here today following up on hx of HFpEF and AFIB status post LINQ placement in 08/2015, patient also has hx of CKD Stage III. 2D. Nuclear stress test in 08/2017 showed mild to moderate anterior wall ischemia.  Echo in 2021 showed mild aortic regurgitation and mitral regurgitation with normal ejection fraction.  Patient returns to my office for follow-up.  Patient feeling overall okay.  Still using wheelchair to come to the office.  Denies having chest pain or shortness of breath.  No palpitations dizziness lightheadedness or syncope.  Blood pressure well-controlled at home according to her and her grandson.    Medical History[1]    Surgical History[2]    Allergies[3]     reports that she has never smoked. She has never been exposed to tobacco smoke. She has never used smokeless tobacco. She reports current alcohol use. She reports that she does not use drugs.    Family History[4]    Patient's Medications   New Prescriptions    No medications on file   Previous Medications    ACETAMINOPHEN (TYLENOL) 325 MG TABLET    Take 1 tablet (325 mg) by mouth 3 times a day as needed for mild pain (1 - 3).    ATORVASTATIN (LIPITOR) 20 MG TABLET    TAKE 1 TABLET BY MOUTH EVERY DAY    BLOOD SUGAR DIAGNOSTIC (ACCU-CHEK SMARTVIEW TEST STRIP) STRIP    USE 1 STRIP INTO VITRO, 3 TIMES A DAY-- E11.65    CALCITRIOL (ROCALTROL) 0.25 MCG CAPSULE    Take 1 capsule (0.25 mcg) by mouth once daily.    CALCIUM CARBONATE-VITAMIN D3 500 MG-5 MCG (200 UNIT) TABLET    Os-Joselo 500 + D 500-200 MG-UNIT TABS Refills: 0   Active    CARVEDILOL (COREG) 3.125 MG TABLET    Take 1 tablet (3.125 mg) by mouth 2 times a day.    DULAGLUTIDE (TRULICITY) 1.5 MG/0.5 ML PEN INJECTOR INJECTION    Inject 1.5 mg under the skin 1 (one) time per week. As directed    HYDRALAZINE (APRESOLINE) 50 MG TABLET    Take 1 tablet (50 mg) by mouth 2 times a day.    INSULIN GLARGINE (BASAGLAR KWIKPEN U-100 INSULIN) 100 UNIT/ML (3  "ML) PEN    Inject 12 Units under the skin once daily in the morning. As directed    INSULIN LISPRO (HUMALOG KWIKPEN INSULIN) 100 UNIT/ML INJECTION    Up to 30 units daily As directed    ISOSORBIDE MONONITRATE ER (IMDUR) 60 MG 24 HR TABLET    Take 1 tablet (60 mg) by mouth once daily.    LATANOPROST (XALATAN) 0.005 % OPHTHALMIC SOLUTION    Administer 1 drop into affected eye(s) once daily at bedtime. Both eyes    LUBRICATING EYE DROPS (POLYVINYL ALCOHOL-POVIDON,PF,) OPHTHALMIC SOLUTION    Administer 1 drop into both eyes if needed for dry eyes.    OMEPRAZOLE (PRILOSEC) 20 MG DR CAPSULE    Take 1 capsule (20 mg) by mouth once daily in the morning. Take before meals. 30 minutes before    PEN NEEDLE, DIABETIC 32 GAUGE X 5/32\" NEEDLE    1 Needle 4 times a day.    POTASSIUM CHLORIDE CR 10 MEQ ER TABLET    TAKE 1 TABLET BY MOUTH TWICE A DAY WITH FOOD FOR 90 DAYS    TIMOLOL (TIMOPTIC) 0.5 % OPHTHALMIC SOLUTION    Administer 1 drop into both eyes once daily in the morning. *NEED APPT FOR FUTURE FILLS*    TORSEMIDE (DEMADEX) 100 MG TABLET    Take 0.5 tablets (50 mg) by mouth 2 times a day.    VITAMIN B COMPLEX ORAL    Take 1 tablet by mouth once daily. As directed    WARFARIN (COUMADIN) 6 MG TABLET    TAKE 1 TABLET (6 MG) BY MOUTH SEE ADMINISTRATION INSTRUCTIONS. 1 1/2 FRIDAY 6 MG REST OF WK   Modified Medications    No medications on file   Discontinued Medications    No medications on file       Objective   Physical Exam  General: Patient in no acute distress   HEENT: Atraumatic normocephalic.  Neck: Supple, jugular venous pressure within normal limit.  No bruits  Lungs: Clear to auscultation bilaterally  Cardiovascular: Regular rate and rhythm, 3 out of 6 ejection systolic murmur with preserved S2   abdomen: Soft nontender nondistended.  Normal bowel sounds.  Extremities: Warm to touch, no edema.    Lab Review   Anticoagulation - Warfarin Visit on 04/11/2025   Component Date Value    POC INR 04/11/2025 2.00  "   Anticoagulation - Warfarin Visit on 03/14/2025   Component Date Value    POC INR 03/14/2025 2.40         Assessment/Plan   Problem List[5]   89 year old female patient here today following up on hx of HFpEF and AFIB status post LINQ placement in 08/2015, patient also has hx of CKD Stage III. 2D. Nuclear stress test in 08/2017 showed mild to moderate anterior wall ischemia.  Echo in 2021 showed mild aortic regurgitation and mitral regurgitation with normal ejection fraction.  Patient returns to my office for follow-up.  Patient feeling overall okay.  Still using wheelchair to come to the office.  Denies having chest pain or shortness of breath.  No palpitations dizziness lightheadedness or syncope.  Blood pressure well-controlled at home according to her and her grandson.    Patient stable cardiac wise we will continue current medications.  Discussed with her lifestyle modification and salt restriction.  Denies lower extremity edema.  We will obtain echocardiogram to monitor her aortic valve stenosis since she has a louder murmur now and before.  S2 remains preserved.      Ian Potts MD              [1]   Past Medical History:  Diagnosis Date    Adrenal mass (Multi)     At risk for bleeding 09/07/2023    Atherosclerotic heart disease of native coronary artery without angina pectoris 09/07/2023    Cataract     Chronic atrial fibrillation (Multi) 09/07/2023    Chronic diastolic heart failure     Glaucoma     History of ischemic stroke 09/07/2023    Hyperlipidemia     Hypertension     Iron deficiency anemia     Mixed hyperlipidemia 09/07/2023    Syncope and collapse 09/07/2023    Type 2 diabetes mellitus     Vitamin B12 deficiency    [2]   Past Surgical History:  Procedure Laterality Date    MR HEAD ANGIO WO IV CONTRAST  06/14/2016    MR HEAD ANGIO WO IV CONTRAST [facility]   [3] No Known Allergies  [4]   Family History  Problem Relation Name Age of Onset    Other (VAD) Mother      Diabetes Father       Other (Sepsis) Father      Other (DJD) Sister      Diabetes Sister      Factor V Leiden deficiency Sister      Other (CKD) Brother      Heart disease Other      Hypertension Other      Cancer Other      Stroke Other     [5]   Patient Active Problem List  Diagnosis    At risk for bleeding    Adrenal mass (Multi)    Age-related nuclear cataract, bilateral    Osteoporosis    Anemia    CAD (coronary artery disease)    Background diabetic macular edema with mild nonproliferative retinopathy associated with type 2 diabetes mellitus    Cataracts, bilateral    Cerebrovascular disease    Heart failure    CKD (chronic kidney disease)    Diabetic retinopathy (Multi)    HTN (hypertension)    GERD (gastroesophageal reflux disease)    History of ischemic stroke    T2DM (type 2 diabetes mellitus) (Multi)    HLD (hyperlipidemia)    Obesity    Posterior vitreous detachment of both eyes    Primary open angle glaucoma    Glaucoma    Subclinical hypothyroidism    Vitamin D deficiency    Wears glasses    Atrial fibrillation (Multi)    Physical debility    BPPV (benign paroxysmal positional vertigo)

## 2025-04-24 NOTE — PATIENT INSTRUCTIONS
If your approved for Eliquis [apixaban] and then take 2.5 mg oral twice daily.  But before start taking it stop warfarin for 4 days

## 2025-04-25 VITALS
HEART RATE: 60 BPM | SYSTOLIC BLOOD PRESSURE: 125 MMHG | DIASTOLIC BLOOD PRESSURE: 70 MMHG | BODY MASS INDEX: 35.26 KG/M2 | RESPIRATION RATE: 18 BRPM | TEMPERATURE: 98.6 F | HEIGHT: 63 IN | WEIGHT: 199 LBS | OXYGEN SATURATION: 99 %

## 2025-04-25 PROBLEM — I35.0 NONRHEUMATIC AORTIC VALVE STENOSIS: Status: ACTIVE | Noted: 2025-04-25

## 2025-04-25 NOTE — PROGRESS NOTES
HPI   89 yo previously of Dr. Fraire practice with dm2, htn ckd (sees Dr. Portillo), cva, cad, dyslipidemia presents in f/u. A1c was 6.9%, today 7.1%.      Pt testing blood sugars 4 times per day with libre3.  Pt following a carb controlled diet and knows reasonable carb allowances.   Lives with Pantera abad, and he accompanied her to this visit today.      -taking basaglar 12 units am,   -has been off 4 units humalog at meals   -taking trulicity 1.5mg, tolerating     Xochitl 3 data for 30 days: 73% in range, 0% low, pattern: mid 100's overnight into waking, some excursions after meals up to 200 (for the most part at target)  -pain can elevate sugars      - [employer] for her dm supplies      Taking atorvastatin 20mg for lipids and tolerating.           Taking torsemide, carvedilol, hydralazine for htn, following with nephrology Dr. Portillo.       Current Outpatient Medications:     acetaminophen (Tylenol) 325 mg tablet, Take 1 tablet (325 mg) by mouth 3 times a day as needed for mild pain (1 - 3)., Disp: , Rfl:     apixaban (Eliquis) 2.5 mg tablet, Take 1 tablet (2.5 mg) by mouth 2 times a day., Disp: 60 tablet, Rfl: 11    atorvastatin (Lipitor) 20 mg tablet, TAKE 1 TABLET BY MOUTH EVERY DAY, Disp: 90 tablet, Rfl: 3    blood sugar diagnostic (Accu-Chek SmartView Test Strip) strip, USE 1 STRIP INTO VITRO, 3 TIMES A DAY-- E11.65, Disp: 300 strip, Rfl: 1    calcitriol (Rocaltrol) 0.25 mcg capsule, Take 1 capsule (0.25 mcg) by mouth once daily., Disp: , Rfl:     calcium carbonate-vitamin D3 500 mg-5 mcg (200 unit) tablet, Os-Joselo 500 + D 500-200 MG-UNIT TABS Refills: 0   Active, Disp: , Rfl:     carvedilol (Coreg) 3.125 mg tablet, Take 1 tablet (3.125 mg) by mouth 2 times a day., Disp: 180 tablet, Rfl: 3    dulaglutide (Trulicity) 0.75 mg/0.5 mL pen injector, Inject under the skin. as directed, Disp: , Rfl:     hydrALAZINE (Apresoline) 50 mg tablet, Take 1 tablet (50 mg) by mouth 2 times a day., Disp: 180 tablet,  "Rfl: 3    insulin glargine (Basaglar KwikPen U-100 Insulin) 100 unit/mL (3 mL) pen, Inject 12 Units under the skin once daily in the morning. As directed, Disp: , Rfl:     isosorbide mononitrate ER (Imdur) 60 mg 24 hr tablet, Take 1 tablet (60 mg) by mouth once daily., Disp: 90 tablet, Rfl: 3    latanoprost (Xalatan) 0.005 % ophthalmic solution, Administer 1 drop into affected eye(s) once daily at bedtime. Both eyes, Disp: , Rfl:     lubricating eye drops (polyvinyl alcohol-povidon,PF,) ophthalmic solution, Administer 1 drop into both eyes if needed for dry eyes., Disp: , Rfl:     omeprazole (PriLOSEC) 20 mg DR capsule, Take 1 capsule (20 mg) by mouth once daily in the morning. Take before meals. 30 minutes before, Disp: , Rfl:     pen needle, diabetic 32 gauge x 5/32\" needle, 1 Needle 4 times a day., Disp: 400 each, Rfl: 1    potassium chloride CR 10 mEq ER tablet, TAKE 1 TABLET BY MOUTH TWICE A DAY WITH FOOD FOR 90 DAYS, Disp: 180 tablet, Rfl: 3    timolol (Timoptic) 0.5 % ophthalmic solution, Administer 1 drop into both eyes once daily in the morning. *NEED APPT FOR FUTURE FILLS*, Disp: , Rfl:     VITAMIN B COMPLEX ORAL, Take 1 tablet by mouth once daily. As directed, Disp: , Rfl:     dulaglutide (Trulicity) 1.5 mg/0.5 mL pen injector injection, Inject 1.5 mg under the skin 1 (one) time per week. As directed (Patient not taking: Reported on 4/28/2025), Disp: 2 mL, Rfl: 1    insulin lispro (HumaLOG KwikPen Insulin) 100 unit/mL injection, Up to 30 units daily As directed (Patient not taking: Reported on 4/28/2025), Disp: 45 mL, Rfl: 3    torsemide (Demadex) 100 mg tablet, Take 0.5 tablets (50 mg) by mouth 2 times a day., Disp: 90 tablet, Rfl: 3      Allergies as of 04/28/2025    (No Known Allergies)         Review of Systems   Cardiology: Lightheadedness-denies.  Chest pain-denies.  Leg edema-denies.  Palpitations-denies.  Respiratory: Cough-denies. Shortness of breath-denies.  Wheezing-denies.  Gastroenterology: " "Constipation + chronic  Diarrhea-denies.  Heartburn-denies.  Endocrinology: Cold intolerance-denies.  Heat intolerance-denies.  Sweats-denies.  Neurology: Headache-denies.  Tremor-denies.  Neuropathy in extremities-denies.  Psychology: Low energy-denies.  Irritability-denies.  Sleep disturbances-denies.      /70 (BP Location: Other (Comment), Patient Position: Sitting)   Ht 1.6 m (5' 3\")   Wt 91.3 kg (201 lb 3.2 oz)   LMP  (LMP Unknown)   BMI 35.64 kg/m²   -r wrist bp reading      Labs:  Lab Results   Component Value Date    WBC 7.4 11/13/2024    NRBC 0.0 11/13/2024    RBC 3.66 (L) 11/13/2024    HGB 12.1 11/13/2024    HCT 37.6 11/13/2024     11/13/2024     Lab Results   Component Value Date    CALCIUM 9.2 11/13/2024    AST 12 11/13/2024    ALKPHOS 78 11/13/2024    BILITOT 0.7 11/13/2024    PROT 6.6 11/13/2024    ALBUMIN 3.2 (L) 11/13/2024    GLOB 4.0 (H) 11/04/2022    GLOB 4.0 (H) 11/04/2022    AGR 0.8 (L) 11/04/2022    AGR 0.8 (L) 11/04/2022     11/13/2024    K 4.2 11/13/2024     11/13/2024    CO2 34 (H) 11/13/2024    ANIONGAP 11 11/13/2024    BUN 19 11/13/2024    CREATININE 1.58 (H) 11/13/2024    UREACREAUR 14.1 09/26/2023    GLUCOSE 162 (H) 11/13/2024    ALT 12 11/13/2024    EGFR 31 (L) 11/13/2024     Lab Results   Component Value Date    CHOL 152 11/13/2024    TRIG 116 11/13/2024    HDL 37.3 11/13/2024    LDLCALC 92 11/13/2024     Lab Results   Component Value Date    MICROALBCREA 144.4 (H) 11/13/2024     Lab Results   Component Value Date    TSH 4.18 (H) 11/13/2024     Lab Results   Component Value Date    TQBRBVAM13 904 11/13/2024     Lab Results   Component Value Date    HGBA1C 7.1 (A) 04/28/2025         Physical Exam   General Appearance: pleasant, cooperative, no acute distress  HEENT: no chemosis, no proptosis, no lid lag, no lid retraction  Neck: no lymphadenopathy, no thyromegaly, no dominant thyroid nodules  Heart: + murmurs, regular rate and rhythm, S1 and S2  Lungs: no " wheezes, no rhonci, no rales  Extremities: no lower extremity swelling      Assessment/Plan   1. Type 2 diabetes mellitus with hyperglycemia, with long-term current use of insulin (Primary)  -A1c ordered and reviewed  -glycemic log ([employer] data) reviewed  -labs reviewed    -overall doing well, if sugars <100 overnight into waking lower insulin in 2 unit intervals or call office    -could look into [facility] for pt assistance, could consult here or with pcp for this    2. Essential hypertension  -at target after resting, following with nephrology and cardiology as well    3. Mixed hyperlipidemia  -on statin and tolerating, labs reviewed, will follow         Follow Up:  pharmD 6 months    Medical Decision Making  Complexity of problem: Chronic illness of diabetes mellitus uncontrolled, progressing  Data analyzed and reviewed: Reviewed prior notes, blood glucose data, labs including HgbA1c, lipids, serum chemistries.  Ordered tests.   Risk of complications and morbidities: Is definite because of use of insulin and risk of hypoglycemia.  Prescription medications reviewed and modifications made.  Compliance assessed.  Addressed social determinants of health including food insecurity.

## 2025-04-28 ENCOUNTER — APPOINTMENT (OUTPATIENT)
Dept: ENDOCRINOLOGY | Facility: CLINIC | Age: OVER 89
End: 2025-04-28
Payer: MEDICARE

## 2025-04-28 ENCOUNTER — TELEPHONE (OUTPATIENT)
Dept: CARDIOLOGY | Facility: CLINIC | Age: OVER 89
End: 2025-04-28

## 2025-04-28 VITALS
HEIGHT: 63 IN | WEIGHT: 201.2 LBS | BODY MASS INDEX: 35.65 KG/M2 | SYSTOLIC BLOOD PRESSURE: 110 MMHG | DIASTOLIC BLOOD PRESSURE: 70 MMHG

## 2025-04-28 DIAGNOSIS — E78.2 MIXED HYPERLIPIDEMIA: ICD-10-CM

## 2025-04-28 DIAGNOSIS — E11.65 TYPE 2 DIABETES MELLITUS WITH HYPERGLYCEMIA, WITH LONG-TERM CURRENT USE OF INSULIN: Primary | ICD-10-CM

## 2025-04-28 DIAGNOSIS — Z79.4 TYPE 2 DIABETES MELLITUS WITH HYPERGLYCEMIA, WITH LONG-TERM CURRENT USE OF INSULIN: Primary | ICD-10-CM

## 2025-04-28 DIAGNOSIS — I10 ESSENTIAL HYPERTENSION: ICD-10-CM

## 2025-04-28 LAB — POC HEMOGLOBIN A1C: 7.1 % (ref 4.2–6.5)

## 2025-04-28 PROCEDURE — 1159F MED LIST DOCD IN RCRD: CPT | Performed by: INTERNAL MEDICINE

## 2025-04-28 PROCEDURE — 3074F SYST BP LT 130 MM HG: CPT | Performed by: INTERNAL MEDICINE

## 2025-04-28 PROCEDURE — 3078F DIAST BP <80 MM HG: CPT | Performed by: INTERNAL MEDICINE

## 2025-04-28 PROCEDURE — 1125F AMNT PAIN NOTED PAIN PRSNT: CPT | Performed by: INTERNAL MEDICINE

## 2025-04-28 PROCEDURE — 99214 OFFICE O/P EST MOD 30 MIN: CPT | Performed by: INTERNAL MEDICINE

## 2025-04-28 PROCEDURE — 1036F TOBACCO NON-USER: CPT | Performed by: INTERNAL MEDICINE

## 2025-04-28 PROCEDURE — 83036 HEMOGLOBIN GLYCOSYLATED A1C: CPT | Performed by: INTERNAL MEDICINE

## 2025-04-28 PROCEDURE — 1157F ADVNC CARE PLAN IN RCRD: CPT | Performed by: INTERNAL MEDICINE

## 2025-04-28 RX ORDER — DULAGLUTIDE 0.75 MG/.5ML
INJECTION, SOLUTION SUBCUTANEOUS
COMMUNITY

## 2025-04-28 ASSESSMENT — ENCOUNTER SYMPTOMS
OCCASIONAL FEELINGS OF UNSTEADINESS: 0
DEPRESSION: 0
LOSS OF SENSATION IN FEET: 0

## 2025-04-28 ASSESSMENT — PAIN SCALES - GENERAL: PAINLEVEL_OUTOF10: 6

## 2025-05-02 DIAGNOSIS — E78.2 MIXED HYPERLIPIDEMIA: ICD-10-CM

## 2025-05-02 RX ORDER — ATORVASTATIN CALCIUM 20 MG/1
20 TABLET, FILM COATED ORAL DAILY
Qty: 90 TABLET | Refills: 3 | Status: SHIPPED | OUTPATIENT
Start: 2025-05-02

## 2025-05-20 ENCOUNTER — ANTICOAGULATION - WARFARIN VISIT (OUTPATIENT)
Dept: CARDIOLOGY | Facility: CLINIC | Age: OVER 89
End: 2025-05-20
Payer: MEDICARE

## 2025-05-20 DIAGNOSIS — I48.20 CHRONIC ATRIAL FIBRILLATION (MULTI): ICD-10-CM

## 2025-05-20 PROBLEM — I48.91 ATRIAL FIBRILLATION (MULTI): Status: RESOLVED | Noted: 2023-10-02 | Resolved: 2025-05-20

## 2025-06-03 ENCOUNTER — APPOINTMENT (OUTPATIENT)
Dept: PRIMARY CARE | Facility: CLINIC | Age: OVER 89
End: 2025-06-03
Payer: MEDICARE

## 2025-06-14 LAB
ALBUMIN SERPL-MCNC: 3.7 G/DL (ref 3.6–5.1)
ALP SERPL-CCNC: 66 U/L (ref 37–153)
ALT SERPL-CCNC: 13 U/L (ref 6–29)
ANION GAP SERPL CALCULATED.4IONS-SCNC: 12 MMOL/L (CALC) (ref 7–17)
AST SERPL-CCNC: 12 U/L (ref 10–35)
BASOPHILS # BLD AUTO: 37 CELLS/UL (ref 0–200)
BASOPHILS NFR BLD AUTO: 0.5 %
BILIRUB SERPL-MCNC: 0.5 MG/DL (ref 0.2–1.2)
BUN SERPL-MCNC: 37 MG/DL (ref 7–25)
CALCIUM SERPL-MCNC: 9.4 MG/DL (ref 8.6–10.4)
CHLORIDE SERPL-SCNC: 102 MMOL/L (ref 98–110)
CO2 SERPL-SCNC: 30 MMOL/L (ref 20–32)
CREAT SERPL-MCNC: 2.07 MG/DL (ref 0.6–0.95)
EGFRCR SERPLBLD CKD-EPI 2021: 22 ML/MIN/1.73M2
EOSINOPHIL # BLD AUTO: 197 CELLS/UL (ref 15–500)
EOSINOPHIL NFR BLD AUTO: 2.7 %
ERYTHROCYTE [DISTWIDTH] IN BLOOD BY AUTOMATED COUNT: 12.9 % (ref 11–15)
EST. AVERAGE GLUCOSE BLD GHB EST-MCNC: 166 MG/DL
EST. AVERAGE GLUCOSE BLD GHB EST-SCNC: 9.2 MMOL/L
GLUCOSE SERPL-MCNC: 174 MG/DL (ref 65–99)
HBA1C MFR BLD: 7.4 %
HCT VFR BLD AUTO: 38.1 % (ref 35–45)
HGB BLD-MCNC: 12.6 G/DL (ref 11.7–15.5)
LYMPHOCYTES # BLD AUTO: 2489 CELLS/UL (ref 850–3900)
LYMPHOCYTES NFR BLD AUTO: 34.1 %
MCH RBC QN AUTO: 33.3 PG (ref 27–33)
MCHC RBC AUTO-ENTMCNC: 33.1 G/DL (ref 32–36)
MCV RBC AUTO: 100.8 FL (ref 80–100)
MONOCYTES # BLD AUTO: 555 CELLS/UL (ref 200–950)
MONOCYTES NFR BLD AUTO: 7.6 %
NEUTROPHILS # BLD AUTO: 4022 CELLS/UL (ref 1500–7800)
NEUTROPHILS NFR BLD AUTO: 55.1 %
PLATELET # BLD AUTO: 207 THOUSAND/UL (ref 140–400)
PMV BLD REES-ECKER: 10.4 FL (ref 7.5–12.5)
POTASSIUM SERPL-SCNC: 3.7 MMOL/L (ref 3.5–5.3)
PROT SERPL-MCNC: 7.4 G/DL (ref 6.1–8.1)
RBC # BLD AUTO: 3.78 MILLION/UL (ref 3.8–5.1)
SODIUM SERPL-SCNC: 144 MMOL/L (ref 135–146)
WBC # BLD AUTO: 7.3 THOUSAND/UL (ref 3.8–10.8)

## 2025-06-17 ENCOUNTER — OFFICE VISIT (OUTPATIENT)
Dept: PRIMARY CARE | Facility: CLINIC | Age: OVER 89
End: 2025-06-17
Payer: MEDICARE

## 2025-06-17 VITALS
DIASTOLIC BLOOD PRESSURE: 80 MMHG | HEIGHT: 63 IN | HEART RATE: 46 BPM | SYSTOLIC BLOOD PRESSURE: 138 MMHG | OXYGEN SATURATION: 98 % | BODY MASS INDEX: 35.61 KG/M2 | WEIGHT: 201 LBS

## 2025-06-17 DIAGNOSIS — E11.3219 BACKGROUND DIABETIC MACULAR EDEMA WITH MILD NONPROLIFERATIVE RETINOPATHY ASSOCIATED WITH TYPE 2 DIABETES MELLITUS: ICD-10-CM

## 2025-06-17 DIAGNOSIS — I48.20 CHRONIC ATRIAL FIBRILLATION (MULTI): Primary | ICD-10-CM

## 2025-06-17 DIAGNOSIS — Z00.00 ANNUAL PHYSICAL EXAM: Primary | ICD-10-CM

## 2025-06-17 DIAGNOSIS — I50.32 CHRONIC DIASTOLIC HEART FAILURE: ICD-10-CM

## 2025-06-17 DIAGNOSIS — M81.0 AGE-RELATED OSTEOPOROSIS WITHOUT CURRENT PATHOLOGICAL FRACTURE: ICD-10-CM

## 2025-06-17 DIAGNOSIS — E78.2 MIXED HYPERLIPIDEMIA: ICD-10-CM

## 2025-06-17 DIAGNOSIS — I25.10 CORONARY ARTERY DISEASE INVOLVING NATIVE CORONARY ARTERY OF NATIVE HEART WITHOUT ANGINA PECTORIS: ICD-10-CM

## 2025-06-17 DIAGNOSIS — E11.65 TYPE 2 DIABETES MELLITUS WITH HYPERGLYCEMIA, WITH LONG-TERM CURRENT USE OF INSULIN: ICD-10-CM

## 2025-06-17 DIAGNOSIS — E11.319 DIABETIC RETINOPATHY ASSOCIATED WITH TYPE 2 DIABETES MELLITUS, MACULAR EDEMA PRESENCE UNSPECIFIED, UNSPECIFIED LATERALITY, UNSPECIFIED RETINOPATHY SEVERITY: ICD-10-CM

## 2025-06-17 DIAGNOSIS — E03.8 SUBCLINICAL HYPOTHYROIDISM: ICD-10-CM

## 2025-06-17 DIAGNOSIS — Z01.89 ENCOUNTER FOR ROUTINE LABORATORY TESTING: ICD-10-CM

## 2025-06-17 DIAGNOSIS — I67.9 CEREBROVASCULAR DISEASE: ICD-10-CM

## 2025-06-17 DIAGNOSIS — K21.9 GASTROESOPHAGEAL REFLUX DISEASE WITHOUT ESOPHAGITIS: ICD-10-CM

## 2025-06-17 DIAGNOSIS — Z71.89 COUNSELING REGARDING ADVANCED CARE PLANNING AND GOALS OF CARE: ICD-10-CM

## 2025-06-17 DIAGNOSIS — Z23 ENCOUNTER FOR IMMUNIZATION: ICD-10-CM

## 2025-06-17 DIAGNOSIS — N18.4 STAGE 4 CHRONIC KIDNEY DISEASE (MULTI): ICD-10-CM

## 2025-06-17 DIAGNOSIS — E55.9 VITAMIN D DEFICIENCY: ICD-10-CM

## 2025-06-17 DIAGNOSIS — I10 PRIMARY HYPERTENSION: ICD-10-CM

## 2025-06-17 DIAGNOSIS — Z79.4 TYPE 2 DIABETES MELLITUS WITH HYPERGLYCEMIA, WITH LONG-TERM CURRENT USE OF INSULIN: ICD-10-CM

## 2025-06-17 DIAGNOSIS — D64.9 ANEMIA, UNSPECIFIED TYPE: ICD-10-CM

## 2025-06-17 DIAGNOSIS — R53.81 PHYSICAL DEBILITY: ICD-10-CM

## 2025-06-17 PROCEDURE — 99215 OFFICE O/P EST HI 40 MIN: CPT | Mod: 25 | Performed by: STUDENT IN AN ORGANIZED HEALTH CARE EDUCATION/TRAINING PROGRAM

## 2025-06-17 PROCEDURE — 99214 OFFICE O/P EST MOD 30 MIN: CPT | Performed by: STUDENT IN AN ORGANIZED HEALTH CARE EDUCATION/TRAINING PROGRAM

## 2025-06-17 ASSESSMENT — ACTIVITIES OF DAILY LIVING (ADL)
GROCERY_SHOPPING: INDEPENDENT
MANAGING_FINANCES: INDEPENDENT
TAKING_MEDICATION: INDEPENDENT
DRESSING: INDEPENDENT
DOING_HOUSEWORK: INDEPENDENT
BATHING: INDEPENDENT

## 2025-06-17 ASSESSMENT — PAIN SCALES - GENERAL: PAINLEVEL_OUTOF10: 0-NO PAIN

## 2025-06-17 NOTE — PROGRESS NOTES
[facility]: [facility]  MEDICARE WELLNESS EXAM      Shoshana Thomas is a 89 y.o. female that is presenting today for Annual Exam.    Assessment/Plan    - Overall, the patient feels well and denies any acute symptoms / concerns at this time.  - Blood pressure at goal today.  - Encouraged continued dietary, exercise, and lifestyle modification.  - Significant medication and problem list reconciliation done today.     Discussed routine and/or preventative care with the patient as outlined below:  - Labwork:   - Patient had labwork done for this appointment. Discussed today.   - Will order labwork for the patient's next appointment. Encouraged the patient to get this labwork done one week prior to the next appointment.  - Imaging:   - Patient does not appear to be due for routine imaging at this time.  - Immunizations:   - Encouraged the patient to get their shingles (shingrix) immunizations.  - Discussed the RSV immunization.  - Discussed the tetanus (TDAP) booster.     ADVANCED CARE PLANNING  Advanced Care Planning was discussed with patient.  Encouraged the patient to confirm that Living Will and Healthcare Power of  (HCPoA) are accurate and up to date.  Encouraged the patient to confirm that our office be provided a copy of any documentation in the event that anything changes.    Diagnoses and all orders for this visit:  Annual physical exam  -     Follow Up In Primary Care  Counseling regarding advanced care planning and goals of care  Encounter for routine laboratory testing  Encounter for immunization  Diabetic retinopathy associated with type 2 diabetes mellitus, macular edema presence unspecified, unspecified laterality, unspecified retinopathy severity  Background diabetic macular edema with mild nonproliferative retinopathy associated with type 2 diabetes mellitus  Coronary artery disease involving native coronary artery of native heart without angina pectoris  -     CBC and  Auto Differential; Future  -     Comprehensive Metabolic Panel; Future  -     Hemoglobin A1C; Future  -     Vitamin D 25-Hydroxy,Total (for eval of Vitamin D levels); Future  -     Follow Up In Primary Care; Future  Type 2 diabetes mellitus with hyperglycemia, with long-term current use of insulin  -     CBC and Auto Differential; Future  -     Comprehensive Metabolic Panel; Future  -     Hemoglobin A1C; Future  -     Vitamin D 25-Hydroxy,Total (for eval of Vitamin D levels); Future  -     Follow Up In Primary Care; Future  Age-related osteoporosis without current pathological fracture  -     CBC and Auto Differential; Future  -     Comprehensive Metabolic Panel; Future  -     Hemoglobin A1C; Future  -     Vitamin D 25-Hydroxy,Total (for eval of Vitamin D levels); Future  -     Follow Up In Primary Care; Future  Gastroesophageal reflux disease without esophagitis  -     CBC and Auto Differential; Future  -     Comprehensive Metabolic Panel; Future  -     Hemoglobin A1C; Future  -     Vitamin D 25-Hydroxy,Total (for eval of Vitamin D levels); Future  -     Follow Up In Primary Care; Future  Stage 4 chronic kidney disease (Multi)  -     CBC and Auto Differential; Future  -     Comprehensive Metabolic Panel; Future  -     Hemoglobin A1C; Future  -     Vitamin D 25-Hydroxy,Total (for eval of Vitamin D levels); Future  -     Follow Up In Primary Care; Future  Chronic diastolic heart failure  -     CBC and Auto Differential; Future  -     Comprehensive Metabolic Panel; Future  -     Hemoglobin A1C; Future  -     Vitamin D 25-Hydroxy,Total (for eval of Vitamin D levels); Future  -     Follow Up In Primary Care; Future  Subclinical hypothyroidism  -     CBC and Auto Differential; Future  -     Comprehensive Metabolic Panel; Future  -     Hemoglobin A1C; Future  -     Vitamin D 25-Hydroxy,Total (for eval of Vitamin D levels); Future  -     Follow Up In Primary Care; Future  Anemia, unspecified type  -     CBC and Auto  Differential; Future  -     Comprehensive Metabolic Panel; Future  -     Hemoglobin A1C; Future  -     Vitamin D 25-Hydroxy,Total (for eval of Vitamin D levels); Future  -     Follow Up In Primary Care; Future  Cerebrovascular disease  -     CBC and Auto Differential; Future  -     Comprehensive Metabolic Panel; Future  -     Hemoglobin A1C; Future  -     Vitamin D 25-Hydroxy,Total (for eval of Vitamin D levels); Future  -     Follow Up In Primary Care; Future  Mixed hyperlipidemia  -     CBC and Auto Differential; Future  -     Comprehensive Metabolic Panel; Future  -     Hemoglobin A1C; Future  -     Vitamin D 25-Hydroxy,Total (for eval of Vitamin D levels); Future  -     Follow Up In Primary Care; Future  Primary hypertension  -     CBC and Auto Differential; Future  -     Comprehensive Metabolic Panel; Future  -     Hemoglobin A1C; Future  -     Vitamin D 25-Hydroxy,Total (for eval of Vitamin D levels); Future  -     Follow Up In Primary Care; Future  Vitamin D deficiency  -     CBC and Auto Differential; Future  -     Comprehensive Metabolic Panel; Future  -     Hemoglobin A1C; Future  -     Vitamin D 25-Hydroxy,Total (for eval of Vitamin D levels); Future  -     Follow Up In Primary Care; Future  Physical debility  -     CBC and Auto Differential; Future  -     Comprehensive Metabolic Panel; Future  -     Hemoglobin A1C; Future  -     Vitamin D 25-Hydroxy,Total (for eval of Vitamin D levels); Future  -     Follow Up In Primary Care; Future    Current Outpatient Medications   Medication Instructions    acetaminophen (Tylenol) 325 mg tablet 1 tablet, 3 times daily PRN    atorvastatin (LIPITOR) 20 mg, oral, Daily    Basaglar KwikPen U-100 Insulin 12 Units, subcutaneous, Every morning, As directed    blood sugar diagnostic (Accu-Chek SmartView Test Strip) strip USE 1 STRIP INTO VITRO, 3 TIMES A DAY-- E11.65    calcitriol (ROCALTROL) 0.25 mcg, Daily    calcium carbonate-vitamin D3 500 mg-5 mcg (200 unit) tablet  "Os-Joselo 500 + D 500-200 MG-UNIT TABS Refills: 0   Active    carvedilol (COREG) 3.125 mg, oral, 2 times daily    Eliquis 2.5 mg, oral, 2 times daily    hydrALAZINE (APRESOLINE) 50 mg, oral, 2 times daily    isosorbide mononitrate ER (IMDUR) 60 mg, oral, Daily    latanoprost (Xalatan) 0.005 % ophthalmic solution 1 drop, Nightly    lubricating eye drops (polyvinyl alcohol-povidon,PF,) ophthalmic solution 1 drop, As needed    omeprazole (PRILOSEC) 20 mg, Daily before breakfast    pen needle, diabetic 32 gauge x 5/32\" needle 1 Needle, miscellaneous, 4 times daily    potassium chloride CR 10 mEq ER tablet TAKE 1 TABLET BY MOUTH TWICE A DAY WITH FOOD FOR 90 DAYS    timolol (Timoptic) 0.5 % ophthalmic solution 1 drop, Every morning    torsemide (DEMADEX) 50 mg, oral, 2 times daily    Trulicity 1.5 mg, subcutaneous, Once Weekly, As directed    VITAMIN B COMPLEX ORAL 1 tablet, Daily     Subjective   HPI  Review of Systems  Objective   Vitals:    06/17/25 1511   BP: 138/80   Pulse: (!) 46   SpO2: 98%      Body mass index is 35.61 kg/m².  Physical Exam  Diagnostic Results   Lab Results   Component Value Date    GLUCOSE 174 (H) 06/13/2025    CALCIUM 9.4 06/13/2025     06/13/2025    K 3.7 06/13/2025    CO2 30 06/13/2025     06/13/2025    BUN 37 (H) 06/13/2025    CREATININE 2.07 (H) 06/13/2025     Lab Results   Component Value Date    ALT 13 06/13/2025    AST 12 06/13/2025    ALKPHOS 66 06/13/2025    BILITOT 0.5 06/13/2025     Lab Results   Component Value Date    WBC 7.3 06/13/2025    HGB 12.6 06/13/2025    HCT 38.1 06/13/2025    .8 (H) 06/13/2025     06/13/2025     Lab Results   Component Value Date    CHOL 152 11/13/2024    CHOL 133 11/04/2022    CHOL 133 11/04/2022     Lab Results   Component Value Date    HDL 37.3 11/13/2024    HDL 42 (L) 11/04/2022    HDL 42 (L) 11/04/2022     Lab Results   Component Value Date    LDLCALC 92 11/13/2024    LDLCALC 75 11/04/2022    LDLCALC 75 11/04/2022     Lab Results " "  Component Value Date    TRIG 116 11/13/2024    TRIG 82 11/04/2022    TRIG 82 11/04/2022     No components found for: \"CHOLHDL\"  Lab Results   Component Value Date    HGBA1C 7.4 (H) 06/13/2025     Other labs not included in the list above reviewed either before or during this encounter.    History   Medical History[1]  Surgical History[2]  Family History[3]  Social History     Socioeconomic History    Marital status:      Spouse name: Not on file    Number of children: Not on file    Years of education: Not on file    Highest education level: Not on file   Occupational History    Not on file   Tobacco Use    Smoking status: Never     Passive exposure: Never    Smokeless tobacco: Never   Vaping Use    Vaping status: Never Used   Substance and Sexual Activity    Alcohol use: Yes     Comment: very rarley\ Occasional    Drug use: Never    Sexual activity: Defer   Other Topics Concern    Not on file   Social History Narrative    Not on file     Social Drivers of Health     Financial Resource Strain: Low Risk  (9/19/2024)    Overall Financial Resource Strain (CARDIA)     Difficulty of Paying Living Expenses: Not hard at all   Food Insecurity: No Food Insecurity (9/19/2024)    Hunger Vital Sign     Worried About Running Out of Food in the Last Year: Never true     Ran Out of Food in the Last Year: Never true   Transportation Needs: No Transportation Needs (10/22/2024)    OASIS : Transportation     Lack of Transportation (Medical): No     Lack of Transportation (Non-Medical): No     Patient Unable or Declines to Respond: No   Physical Activity: Not on file   Stress: Not on file   Social Connections: Feeling Socially Integrated (10/22/2024)    OASIS : Social Isolation     Frequency of experiencing loneliness or isolation: Never   Intimate Partner Violence: Not At Risk (9/19/2024)    Humiliation, Afraid, Rape, and Kick questionnaire     Fear of Current or Ex-Partner: No     Emotionally Abused: No     " Physically Abused: No     Sexually Abused: No   Housing Stability: Low Risk  (9/19/2024)    Housing Stability Vital Sign     Unable to Pay for Housing in the Last Year: No     Number of Times Moved in the Last Year: 0     Homeless in the Last Year: No     Allergies[4]  Medications Ordered Prior to Encounter[5]  Immunization History   Administered Date(s) Administered    Flu vaccine, quadrivalent, high-dose, preservative free, age 65y+ (FLUZONE) 11/08/2021, 11/11/2022, 10/20/2023    Flu vaccine, trivalent, preservative free, HIGH-DOSE, age 65y+ (Fluzone) 09/12/2013, 09/24/2014, 10/08/2015, 10/07/2016, 10/13/2017, 11/09/2018, 10/07/2019, 09/21/2020, 11/08/2021, 11/11/2022, 10/20/2023, 10/10/2024    Influenza Whole 11/07/2007    Influenza, seasonal, injectable 10/04/2010, 10/26/2011, 10/01/2012    Moderna SARS-CoV-2 Vaccination 02/05/2021, 03/06/2021, 01/13/2022    Novel influenza-H1N1-09, preservative-free 01/12/2010    Pneumococcal conjugate vaccine, 13-valent (PREVNAR 13) 11/20/2015    Pneumococcal polysaccharide vaccine, 23-valent, age 2 years and older (PNEUMOVAX 23) 11/29/2005, 12/23/2019     Patient's medical history was reviewed and updated either before or during this encounter.     Gene Fraser MD       [1]   Past Medical History:  Diagnosis Date    Adrenal mass (Multi)     At risk for bleeding 09/07/2023    Atherosclerotic heart disease of native coronary artery without angina pectoris 09/07/2023    Cataract     Chronic atrial fibrillation (Multi) 09/07/2023    Chronic diastolic heart failure     Glaucoma     History of ischemic stroke 09/07/2023    Hyperlipidemia     Hypertension     Iron deficiency anemia     Mixed hyperlipidemia 09/07/2023    Syncope and collapse 09/07/2023    Type 2 diabetes mellitus     Vitamin B12 deficiency    [2]   Past Surgical History:  Procedure Laterality Date    MR HEAD ANGIO WO IV CONTRAST  06/14/2016    MR HEAD ANGIO WO IV CONTRAST [facility]   [3]   Family  History  Problem Relation Name Age of Onset    Other (VAD) Mother      Diabetes Father      Other (Sepsis) Father      Other (DJD) Sister      Diabetes Sister      Factor V Leiden deficiency Sister      Other (CKD) Brother      Heart disease Other      Hypertension Other      Cancer Other      Stroke Other     [4] No Known Allergies  [5]   Current Outpatient Medications on File Prior to Visit   Medication Sig Dispense Refill    acetaminophen (Tylenol) 325 mg tablet Take 1 tablet (325 mg) by mouth 3 times a day as needed for mild pain (1 - 3).      apixaban (Eliquis) 2.5 mg tablet Take 1 tablet (2.5 mg) by mouth 2 times a day. 60 tablet 11    atorvastatin (Lipitor) 20 mg tablet TAKE 1 TABLET BY MOUTH EVERY DAY 90 tablet 3    blood sugar diagnostic (Accu-Chek SmartView Test Strip) strip USE 1 STRIP INTO VITRO, 3 TIMES A DAY-- E11.65 300 strip 1    calcitriol (Rocaltrol) 0.25 mcg capsule Take 1 capsule (0.25 mcg) by mouth once daily.      calcium carbonate-vitamin D3 500 mg-5 mcg (200 unit) tablet Os-Joselo 500 + D 500-200 MG-UNIT TABS Refills: 0   Active      carvedilol (Coreg) 3.125 mg tablet Take 1 tablet (3.125 mg) by mouth 2 times a day. 180 tablet 3    dulaglutide (Trulicity) 1.5 mg/0.5 mL pen injector injection Inject 1.5 mg under the skin 1 (one) time per week. As directed 2 mL 1    hydrALAZINE (Apresoline) 50 mg tablet Take 1 tablet (50 mg) by mouth 2 times a day. 180 tablet 3    insulin glargine (Basaglar KwikPen U-100 Insulin) 100 unit/mL (3 mL) pen Inject 12 Units under the skin once daily in the morning. As directed      isosorbide mononitrate ER (Imdur) 60 mg 24 hr tablet Take 1 tablet (60 mg) by mouth once daily. 90 tablet 3    latanoprost (Xalatan) 0.005 % ophthalmic solution Administer 1 drop into affected eye(s) once daily at bedtime. Both eyes      lubricating eye drops (polyvinyl alcohol-povidon,PF,) ophthalmic solution Administer 1 drop into both eyes if needed for dry eyes.      omeprazole (PriLOSEC)  "20 mg DR capsule Take 1 capsule (20 mg) by mouth once daily in the morning. Take before meals. 30 minutes before      pen needle, diabetic 32 gauge x 5/32\" needle 1 Needle 4 times a day. 400 each 1    potassium chloride CR 10 mEq ER tablet TAKE 1 TABLET BY MOUTH TWICE A DAY WITH FOOD FOR 90 DAYS 180 tablet 3    timolol (Timoptic) 0.5 % ophthalmic solution Administer 1 drop into both eyes once daily in the morning. *NEED APPT FOR FUTURE FILLS*      torsemide (Demadex) 100 mg tablet Take 0.5 tablets (50 mg) by mouth 2 times a day. 90 tablet 3    VITAMIN B COMPLEX ORAL Take 1 tablet by mouth once daily. As directed      [DISCONTINUED] dulaglutide (Trulicity) 0.75 mg/0.5 mL pen injector Inject under the skin. as directed      [DISCONTINUED] insulin lispro (HumaLOG KwikPen Insulin) 100 unit/mL injection Up to 30 units daily As directed (Patient not taking: Reported on 4/24/2025) 45 mL 3     No current facility-administered medications on file prior to visit.     "

## 2025-06-18 ENCOUNTER — TELEMEDICINE (OUTPATIENT)
Dept: PHARMACY | Facility: HOSPITAL | Age: OVER 89
End: 2025-06-18
Payer: MEDICARE

## 2025-06-18 DIAGNOSIS — I48.20 CHRONIC ATRIAL FIBRILLATION (MULTI): ICD-10-CM

## 2025-06-18 DIAGNOSIS — I35.0 NONRHEUMATIC AORTIC VALVE STENOSIS: ICD-10-CM

## 2025-06-18 NOTE — PROGRESS NOTES
MEDICATION MANAGEMENT    Shoshana Thomas is a 89 y.o. female who was referred by Gene Fraser MD to complete medication reconciliation and discuss anticoagulation with the clinical pharmacist.  A comprehensive medication review was completed with the patient via telephone today.  Patient reports financial barrier with current medication.      MEDICATION HISTORY  Allergies[1]  Medications Ordered Prior to Encounter[2]     Patient Assistance Screening (VAF)  Patient verbally reports monthly or yearly income which is less than 400% federal poverty level.  Application for program has been submitted for the following medications: Eliquis  Patient has been informed that program team will be reaching out to them to discuss necessary documentation, instructed to answer phone/return voicemail.   Patient aware this process may take up to 6 weeks.   If approved medication must be filled through [facility] pharmacy and may be picked up or mailed to patient.     Eliquis dosing:  Age>80: yes  Weight < 60 kg: no  Scr >1.5: yes  2.5 mg BID dosing appropriate     LABS  LMP  (LMP Unknown)    Lab Results   Component Value Date    HGBA1C 7.4 (H) 06/13/2025    HGBA1C 7.1 (A) 04/28/2025    HGBA1C 7.1 (H) 11/13/2024     Lab Results   Component Value Date    BILITOT 0.5 06/13/2025    CALCIUM 9.4 06/13/2025    CO2 30 06/13/2025     06/13/2025    CREATININE 2.07 (H) 06/13/2025    GLUCOSE 174 (H) 06/13/2025    ALKPHOS 66 06/13/2025    K 3.7 06/13/2025    PROT 7.4 06/13/2025     06/13/2025    AST 12 06/13/2025    ALT 13 06/13/2025    BUN 37 (H) 06/13/2025    ANIONGAP 12 06/13/2025    MG 1.86 09/19/2024    PHOS 3.6 11/13/2024     (H) 09/18/2018    ALBUMIN 3.7 06/13/2025    LIPASE 35 05/14/2022    GFRF 29 (A) 04/24/2023     Lab Results   Component Value Date    TRIG 116 11/13/2024    CHOL 152 11/13/2024    LDLCALC 92 11/13/2024    HDL 37.3 11/13/2024         Assessment/Plan    Comments/Recommendations to  PCP:    Continue Eliquis 2.5 mg twice daily for anticoagulation due to atrial fibrillation. Patient reports no bleeding that wont stop or bruising that won't heal. Patient knows to go to the hospital for any bleeding that won't stop after 15 minutes, any falls where they might have hit their head, or for any extreme stomach pain. Will continue to monitor for adverse effects and effective anticoagulation for continued treatment.     Provided medication counseling and answered patient's medication questions.     Follow up with pharmacy: yearly or sooner if needed  Follow up with PCP: 6 months    Alpesh Jackson, PharmD, BCPS    Verbal consent to manage patient's drug therapy was obtained from the patient and/or an individual authorized to act on behalf of a patient. They were informed they may decline to participate or withdraw from participation in pharmacy services at any time.         [1] No Known Allergies  [2]   Current Outpatient Medications on File Prior to Visit   Medication Sig Dispense Refill    acetaminophen (Tylenol) 325 mg tablet Take 1 tablet (325 mg) by mouth 3 times a day as needed for mild pain (1 - 3).      apixaban (Eliquis) 2.5 mg tablet Take 1 tablet (2.5 mg) by mouth 2 times a day. 60 tablet 11    atorvastatin (Lipitor) 20 mg tablet TAKE 1 TABLET BY MOUTH EVERY DAY 90 tablet 3    blood sugar diagnostic (Accu-Chek SmartView Test Strip) strip USE 1 STRIP INTO VITRO, 3 TIMES A DAY-- E11.65 300 strip 1    calcitriol (Rocaltrol) 0.25 mcg capsule Take 1 capsule (0.25 mcg) by mouth once daily.      calcium carbonate-vitamin D3 500 mg-5 mcg (200 unit) tablet Os-Joselo 500 + D 500-200 MG-UNIT TABS Refills: 0   Active      carvedilol (Coreg) 3.125 mg tablet Take 1 tablet (3.125 mg) by mouth 2 times a day. 180 tablet 3    dulaglutide (Trulicity) 1.5 mg/0.5 mL pen injector injection Inject 1.5 mg under the skin 1 (one) time per week. As directed 2 mL 1    hydrALAZINE (Apresoline) 50 mg tablet Take 1 tablet (50  "mg) by mouth 2 times a day. 180 tablet 3    insulin glargine (Basaglar KwikPen U-100 Insulin) 100 unit/mL (3 mL) pen Inject 12 Units under the skin once daily in the morning. As directed      isosorbide mononitrate ER (Imdur) 60 mg 24 hr tablet Take 1 tablet (60 mg) by mouth once daily. 90 tablet 3    latanoprost (Xalatan) 0.005 % ophthalmic solution Administer 1 drop into affected eye(s) once daily at bedtime. Both eyes      lubricating eye drops (polyvinyl alcohol-povidon,PF,) ophthalmic solution Administer 1 drop into both eyes if needed for dry eyes.      omeprazole (PriLOSEC) 20 mg DR capsule Take 1 capsule (20 mg) by mouth once daily in the morning. Take before meals. 30 minutes before      pen needle, diabetic 32 gauge x 5/32\" needle 1 Needle 4 times a day. 400 each 1    potassium chloride CR 10 mEq ER tablet TAKE 1 TABLET BY MOUTH TWICE A DAY WITH FOOD FOR 90 DAYS 180 tablet 3    timolol (Timoptic) 0.5 % ophthalmic solution Administer 1 drop into both eyes once daily in the morning. *NEED APPT FOR FUTURE FILLS*      torsemide (Demadex) 100 mg tablet Take 0.5 tablets (50 mg) by mouth 2 times a day. 90 tablet 3    VITAMIN B COMPLEX ORAL Take 1 tablet by mouth once daily. As directed      [DISCONTINUED] dulaglutide (Trulicity) 0.75 mg/0.5 mL pen injector Inject under the skin. as directed      [DISCONTINUED] insulin lispro (HumaLOG KwikPen Insulin) 100 unit/mL injection Up to 30 units daily As directed (Patient not taking: Reported on 4/24/2025) 45 mL 3     No current facility-administered medications on file prior to visit.     "

## 2025-06-23 DIAGNOSIS — I50.30 DIASTOLIC CONGESTIVE HEART FAILURE, UNSPECIFIED HF CHRONICITY: ICD-10-CM

## 2025-06-23 PROCEDURE — RXMED WILLOW AMBULATORY MEDICATION CHARGE

## 2025-06-23 RX ORDER — TORSEMIDE 100 MG/1
50 TABLET ORAL 2 TIMES DAILY
Qty: 90 TABLET | Refills: 3 | Status: SHIPPED | OUTPATIENT
Start: 2025-06-23 | End: 2026-06-18

## 2025-06-23 NOTE — TELEPHONE ENCOUNTER
NOV: 10/14/2025    Requested Prescriptions     Pending Prescriptions Disp Refills    torsemide (Demadex) 100 mg tablet 90 tablet 3     Sig: Take 0.5 tablets (50 mg) by mouth 2 times a day.

## 2025-06-25 ENCOUNTER — PHARMACY VISIT (OUTPATIENT)
Dept: PHARMACY | Facility: CLINIC | Age: OVER 89
End: 2025-06-25
Payer: COMMERCIAL

## 2025-07-17 ENCOUNTER — HOSPITAL ENCOUNTER (OUTPATIENT)
Dept: CARDIOLOGY | Facility: HOSPITAL | Age: OVER 89
Discharge: HOME | End: 2025-07-17
Payer: MEDICARE

## 2025-07-17 DIAGNOSIS — I35.0 NONRHEUMATIC AORTIC VALVE STENOSIS: ICD-10-CM

## 2025-07-17 LAB
AORTIC VALVE MEAN GRADIENT: 18 MMHG
AORTIC VALVE PEAK VELOCITY: 2.7 M/S
AV PEAK GRADIENT: 29 MMHG
AVA (PEAK VEL): 1.16 CM2
AVA (VTI): 1.2 CM2
EJECTION FRACTION APICAL 4 CHAMBER: 63.8
EJECTION FRACTION: 68 %
LEFT ATRIUM VOLUME AREA LENGTH INDEX BSA: 35.2 ML/M2
LEFT VENTRICLE INTERNAL DIMENSION DIASTOLE: 3.46 CM (ref 3.5–6)
LEFT VENTRICULAR OUTFLOW TRACT DIAMETER: 2 CM
LV EJECTION FRACTION BIPLANE: 65 %
RIGHT VENTRICLE FREE WALL PEAK S': 11.9 CM/S
RIGHT VENTRICLE PEAK SYSTOLIC PRESSURE: 37 MMHG
TRICUSPID ANNULAR PLANE SYSTOLIC EXCURSION: 1.7 CM

## 2025-07-17 PROCEDURE — 93306 TTE W/DOPPLER COMPLETE: CPT | Performed by: INTERNAL MEDICINE

## 2025-07-17 PROCEDURE — 93306 TTE W/DOPPLER COMPLETE: CPT

## 2025-07-30 ENCOUNTER — RESULTS FOLLOW-UP (OUTPATIENT)
Facility: CLINIC | Age: OVER 89
End: 2025-07-30
Payer: MEDICARE

## 2025-07-30 NOTE — TELEPHONE ENCOUNTER
----- Message from Ian Potts sent at 7/30/2025 12:46 PM EDT -----  Tell patient that her echocardiogram was okay.  Let me see her as scheduled  ----- Message -----  From: Kayy Chahalo - Cardiology Results In  Sent: 7/17/2025   5:04 PM EDT  To: Ian Potts MD

## 2025-07-31 DIAGNOSIS — Z79.4 TYPE 2 DIABETES MELLITUS WITH HYPERGLYCEMIA, WITH LONG-TERM CURRENT USE OF INSULIN: ICD-10-CM

## 2025-07-31 DIAGNOSIS — E11.65 TYPE 2 DIABETES MELLITUS WITH HYPERGLYCEMIA, WITH LONG-TERM CURRENT USE OF INSULIN: ICD-10-CM

## 2025-07-31 RX ORDER — DULAGLUTIDE 1.5 MG/.5ML
INJECTION, SOLUTION SUBCUTANEOUS
Qty: 6 ML | Refills: 0 | Status: SHIPPED | OUTPATIENT
Start: 2025-07-31

## 2025-08-07 ENCOUNTER — TELEPHONE (OUTPATIENT)
Dept: CARDIOLOGY | Facility: CLINIC | Age: OVER 89
End: 2025-08-07
Payer: MEDICARE

## 2025-08-07 DIAGNOSIS — I48.20 CHRONIC ATRIAL FIBRILLATION (MULTI): ICD-10-CM

## 2025-08-07 DIAGNOSIS — R07.9 CHEST PAIN, UNSPECIFIED TYPE: ICD-10-CM

## 2025-08-07 DIAGNOSIS — I25.10 CORONARY ARTERY DISEASE INVOLVING NATIVE CORONARY ARTERY OF NATIVE HEART WITHOUT ANGINA PECTORIS: ICD-10-CM

## 2025-08-07 DIAGNOSIS — E78.2 MIXED HYPERLIPIDEMIA: ICD-10-CM

## 2025-08-09 DIAGNOSIS — E78.2 MIXED HYPERLIPIDEMIA: ICD-10-CM

## 2025-08-09 DIAGNOSIS — I25.10 CORONARY ARTERY DISEASE INVOLVING NATIVE CORONARY ARTERY OF NATIVE HEART WITHOUT ANGINA PECTORIS: ICD-10-CM

## 2025-08-09 DIAGNOSIS — R07.9 CHEST PAIN, UNSPECIFIED TYPE: ICD-10-CM

## 2025-08-09 DIAGNOSIS — I48.20 CHRONIC ATRIAL FIBRILLATION (MULTI): ICD-10-CM

## 2025-08-11 RX ORDER — POTASSIUM CHLORIDE 750 MG/1
10 TABLET, FILM COATED, EXTENDED RELEASE ORAL 2 TIMES DAILY
Qty: 180 TABLET | Refills: 3 | Status: SHIPPED | OUTPATIENT
Start: 2025-08-11 | End: 2026-08-11

## 2025-08-11 RX ORDER — ISOSORBIDE MONONITRATE 60 MG/1
60 TABLET, EXTENDED RELEASE ORAL DAILY
Qty: 90 TABLET | Refills: 3 | Status: SHIPPED | OUTPATIENT
Start: 2025-08-11 | End: 2026-08-06

## 2025-08-14 RX ORDER — POTASSIUM CHLORIDE 750 MG/1
10 TABLET, FILM COATED, EXTENDED RELEASE ORAL 2 TIMES DAILY
Qty: 180 TABLET | Refills: 3 | Status: SHIPPED | OUTPATIENT
Start: 2025-08-14 | End: 2026-08-14

## 2025-08-14 RX ORDER — ISOSORBIDE MONONITRATE 60 MG/1
60 TABLET, EXTENDED RELEASE ORAL DAILY
Qty: 90 TABLET | Refills: 3 | Status: SHIPPED | OUTPATIENT
Start: 2025-08-14

## 2025-10-14 ENCOUNTER — APPOINTMENT (OUTPATIENT)
Dept: CARDIOLOGY | Facility: CLINIC | Age: OVER 89
End: 2025-10-14
Payer: MEDICARE